# Patient Record
Sex: MALE | Race: WHITE | Employment: OTHER | URBAN - METROPOLITAN AREA
[De-identification: names, ages, dates, MRNs, and addresses within clinical notes are randomized per-mention and may not be internally consistent; named-entity substitution may affect disease eponyms.]

---

## 2018-02-07 ENCOUNTER — HOSPITAL ENCOUNTER (EMERGENCY)
Age: 63
Discharge: ANOTHER HOSPITAL | End: 2018-02-07
Attending: EMERGENCY MEDICINE

## 2018-02-07 ENCOUNTER — HOSPITAL ENCOUNTER (EMERGENCY)
Age: 63
Discharge: HOME | End: 2018-02-08
Attending: EMERGENCY MEDICINE

## 2018-02-07 VITALS
DIASTOLIC BLOOD PRESSURE: 107 MMHG | BODY MASS INDEX: 29.8 KG/M2 | OXYGEN SATURATION: 99 % | RESPIRATION RATE: 17 BRPM | HEART RATE: 71 BPM | WEIGHT: 220 LBS | TEMPERATURE: 97.9 F | SYSTOLIC BLOOD PRESSURE: 156 MMHG | HEIGHT: 72 IN

## 2018-02-07 VITALS
OXYGEN SATURATION: 93 % | DIASTOLIC BLOOD PRESSURE: 120 MMHG | HEART RATE: 71 BPM | RESPIRATION RATE: 18 BRPM | HEIGHT: 72 IN | WEIGHT: 220 LBS | SYSTOLIC BLOOD PRESSURE: 190 MMHG | TEMPERATURE: 97.2 F | BODY MASS INDEX: 29.8 KG/M2
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
## 2018-02-07 DIAGNOSIS — L03.011 CELLULITIS OF FINGER OF RIGHT HAND: Primary | ICD-10-CM

## 2018-02-07 DIAGNOSIS — L08.9 FINGER INFECTION: Primary | ICD-10-CM

## 2018-02-07 DIAGNOSIS — R03.0 ELEVATED BLOOD PRESSURE READING: ICD-10-CM

## 2018-02-07 LAB
ABSOLUTE EOS #: 0.3 K/UL (ref 0–0.4)
ABSOLUTE IMMATURE GRANULOCYTE: ABNORMAL K/UL (ref 0–0.3)
ABSOLUTE LYMPH #: 1.4 K/UL (ref 1–4.8)
ABSOLUTE MONO #: 0.8 K/UL (ref 0.2–0.8)
ANION GAP SERPL CALCULATED.3IONS-SCNC: 13 MMOL/L (ref 9–17)
BASOPHILS # BLD: 0 % (ref 0–2)
BASOPHILS ABSOLUTE: 0 K/UL (ref 0–0.2)
BUN BLDV-MCNC: 23 MG/DL (ref 8–23)
BUN/CREAT BLD: 21 (ref 9–20)
CALCIUM SERPL-MCNC: 8.8 MG/DL (ref 8.6–10.4)
CHLORIDE BLD-SCNC: 102 MMOL/L (ref 98–107)
CO2: 25 MMOL/L (ref 20–31)
CREAT SERPL-MCNC: 1.12 MG/DL (ref 0.7–1.2)
DIFFERENTIAL TYPE: ABNORMAL
EOSINOPHILS RELATIVE PERCENT: 4 % (ref 1–4)
GFR AFRICAN AMERICAN: >60 ML/MIN
GFR NON-AFRICAN AMERICAN: >60 ML/MIN
GFR SERPL CREATININE-BSD FRML MDRD: ABNORMAL ML/MIN/{1.73_M2}
GFR SERPL CREATININE-BSD FRML MDRD: ABNORMAL ML/MIN/{1.73_M2}
GLUCOSE BLD-MCNC: 134 MG/DL (ref 70–99)
HCT VFR BLD CALC: 49.8 % (ref 41–53)
HEMOGLOBIN: 16.2 G/DL (ref 13.5–17.5)
IMMATURE GRANULOCYTES: ABNORMAL %
LYMPHOCYTES # BLD: 18 % (ref 24–44)
MCH RBC QN AUTO: 26.3 PG (ref 26–34)
MCHC RBC AUTO-ENTMCNC: 32.6 G/DL (ref 31–37)
MCV RBC AUTO: 80.8 FL (ref 80–100)
MONOCYTES # BLD: 10 % (ref 1–7)
NRBC AUTOMATED: ABNORMAL PER 100 WBC
PDW BLD-RTO: 13.5 % (ref 11.5–14.5)
PLATELET # BLD: 249 K/UL (ref 130–400)
PLATELET ESTIMATE: ABNORMAL
PMV BLD AUTO: ABNORMAL FL (ref 6–12)
POTASSIUM SERPL-SCNC: 3.9 MMOL/L (ref 3.7–5.3)
RBC # BLD: 6.16 M/UL (ref 4.5–5.9)
RBC # BLD: ABNORMAL 10*6/UL
SEG NEUTROPHILS: 68 % (ref 36–66)
SEGMENTED NEUTROPHILS ABSOLUTE COUNT: 5.2 K/UL (ref 1.8–7.7)
SODIUM BLD-SCNC: 140 MMOL/L (ref 135–144)
WBC # BLD: 7.7 K/UL (ref 3.5–11)
WBC # BLD: ABNORMAL 10*3/UL

## 2018-02-07 PROCEDURE — 85025 COMPLETE CBC W/AUTO DIFF WBC: CPT

## 2018-02-07 PROCEDURE — 10160 PNXR ASPIR ABSC HMTMA BULLA: CPT

## 2018-02-07 PROCEDURE — 99283 EMERGENCY DEPT VISIT LOW MDM: CPT

## 2018-02-07 PROCEDURE — 2500000003 HC RX 250 WO HCPCS: Performed by: PHYSICIAN ASSISTANT

## 2018-02-07 PROCEDURE — 80048 BASIC METABOLIC PNL TOTAL CA: CPT

## 2018-02-07 PROCEDURE — 6370000000 HC RX 637 (ALT 250 FOR IP): Performed by: PHYSICIAN ASSISTANT

## 2018-02-07 PROCEDURE — 99284 EMERGENCY DEPT VISIT MOD MDM: CPT

## 2018-02-07 RX ORDER — LIDOCAINE HYDROCHLORIDE 10 MG/ML
20 INJECTION, SOLUTION INFILTRATION; PERINEURAL ONCE
Status: COMPLETED | OUTPATIENT
Start: 2018-02-07 | End: 2018-02-07

## 2018-02-07 RX ORDER — CLONIDINE HYDROCHLORIDE 0.1 MG/1
0.1 TABLET ORAL ONCE
Status: COMPLETED | OUTPATIENT
Start: 2018-02-07 | End: 2018-02-07

## 2018-02-07 RX ORDER — ASPIRIN 325 MG
325 TABLET ORAL DAILY
Status: ON HOLD | COMMUNITY
End: 2018-08-22 | Stop reason: HOSPADM

## 2018-02-07 RX ORDER — HYDROCHLOROTHIAZIDE 25 MG/1
25 TABLET ORAL DAILY
Status: ON HOLD | COMMUNITY
End: 2018-08-22 | Stop reason: HOSPADM

## 2018-02-07 RX ADMIN — CLONIDINE HYDROCHLORIDE 0.1 MG: 0.1 TABLET ORAL at 21:36

## 2018-02-07 RX ADMIN — LIDOCAINE HYDROCHLORIDE 15 ML: 10 INJECTION, SOLUTION INFILTRATION; PERINEURAL at 19:25

## 2018-02-07 ASSESSMENT — PAIN DESCRIPTION - ORIENTATION: ORIENTATION: RIGHT

## 2018-02-07 ASSESSMENT — PAIN SCALES - GENERAL
PAINLEVEL_OUTOF10: 2
PAINLEVEL_OUTOF10: 0

## 2018-02-07 ASSESSMENT — PAIN DESCRIPTION - PAIN TYPE: TYPE: ACUTE PAIN

## 2018-02-07 ASSESSMENT — PAIN DESCRIPTION - LOCATION: LOCATION: FINGER (COMMENT WHICH ONE)

## 2018-02-08 ENCOUNTER — HOSPITAL ENCOUNTER (EMERGENCY)
Age: 63
Discharge: HOME | End: 2018-02-08
Attending: EMERGENCY MEDICINE

## 2018-02-08 VITALS
TEMPERATURE: 98.2 F | BODY MASS INDEX: 29.8 KG/M2 | DIASTOLIC BLOOD PRESSURE: 124 MMHG | RESPIRATION RATE: 16 BRPM | SYSTOLIC BLOOD PRESSURE: 211 MMHG | HEIGHT: 72 IN | WEIGHT: 220 LBS | OXYGEN SATURATION: 97 % | HEART RATE: 71 BPM

## 2018-02-08 DIAGNOSIS — L03.011 CELLULITIS OF RIGHT RING FINGER: Primary | ICD-10-CM

## 2018-02-08 PROCEDURE — 99282 EMERGENCY DEPT VISIT SF MDM: CPT

## 2018-02-08 ASSESSMENT — ENCOUNTER SYMPTOMS
PHOTOPHOBIA: 0
SORE THROAT: 0
BACK PAIN: 0
DIARRHEA: 0
SHORTNESS OF BREATH: 0
COUGH: 0
EYE PAIN: 0
SHORTNESS OF BREATH: 0
VOMITING: 0
CONSTIPATION: 0
STRIDOR: 0
WHEEZING: 0
NAUSEA: 0
ABDOMINAL DISTENTION: 0
COUGH: 0
WHEEZING: 0

## 2018-02-08 NOTE — ED PROVIDER NOTES
[facility]     Emergency Department     Faculty Attestation        I performed a history and physical examination of the patient and discussed management with the resident. I reviewed the residents note and agree with the documented findings and plan of care. Any areas of disagreement are noted on the chart. I was personally present for the key portions of any procedures. I have documented in the chart those procedures where I was not present during the key portions. I have reviewed the emergency nurses triage note. I agree with the chief complaint, past medical history, past surgical history, allergies, medications, social and family history as documented unless otherwise noted below. For mid-level providers such as nurse practitioners as well as physicians assistants:    I have personally seen and evaluated the patient. I find the patient's history and physical exam are consistent with NP/PA documentation. I agree with the care provided, treatment rendered, disposition, & follow-up plan. Additional findings are as noted. Vital Signs: BP (!) 156/107   Pulse 71   Temp 97.9 °F (36.6 °C) (Oral)   Resp 17   Ht 6' (1.829 m)   Wt 220 lb (99.8 kg)   SpO2 99%   BMI 29.84 kg/m²   PCP:  Kirsten Ramsay MD    Pertinent Comments:     Patient's ring was removed here. Patient has evidence of cellulitis over where the ring was. He is otherwise afebrile nontoxic. There are no clinical signs to suggest a flexor tenosynovitis, at this time. Patient is already Keflex. He has had one dose already. We will have patient to continue to take Keflex. We have asked him to return tomorrow for a recheck. He understands if he gets worse before this to return immediately. Patient is agreeable to plan. He has no questions or concerns prior to discharge. Patient declined prescription for narcotic pain medication.       Critical Care  None         Note, if
and presents today for ring removal from that hand. He states he was seen at [address] [place] [address] ED earlier this evening for similar complaints, and they tried to drain the finger in order to remove the ring; however they were not successful. A motorized ring cutter was used to cut the ring from the dorsal aspect of the hand; the band was cut in one section, and a pair of pliers was used to spread apart the rest of the ring and then removed from the finger. Patient tolerated the procedure with minimal discomfort, and was able to flex and extend finger after removal of the ring. Recommended continuation of course of Keflex 500 mg QID x 10 days. Recommended patient return to the ED on 2/8/18 around [id number] for evaluation of cellulitis, or earlier if symptoms persist.     PROCEDURES:  Ring removal from fourth digit of right hand. See procedure note by Dr. Henrine Meckel. DO Caity.     CONSULTS:  None    CRITICAL CARE:  None    FINAL IMPRESSION      1. Cellulitis of finger of right hand    2. Ring or other jewelry causing external constriction, initial encounter          DISPOSITION / PLAN     DISPOSITION Decision To Discharge 02/08/2018 12:53:37 AM      PATIENT REFERRED TO:  [facility]  [address]  Go today  Follow up for cellulits on 2/8/18 around 1530, or sooner if sympmtoms worsen.       DISCHARGE MEDICATIONS:  New Prescriptions    No medications on file       Hong Govea DO  Emergency Medicine Resident    (Please note that portions of this note were completed with a voice recognition program.  Efforts were made to edit the dictations but occasionally words are mis-transcribed.)       Hong Govea DO  Resident  02/08/18 6611

## 2018-02-08 NOTE — ED PROVIDER NOTES
History Narrative    No narrative on file       History reviewed. No pertinent family history. Allergies:  Lisinopril    Home Medications:  Prior to Admission medications    Medication Sig Start Date End Date Taking? Authorizing Provider   HYDROCHLOROTHIAZIDE PO Take by mouth daily   Yes Historical Provider, MD   aspirin 325 MG tablet Take 325 mg by mouth daily   Yes Historical Provider, MD       REVIEW OF SYSTEMS    (2-9 systems for level 4, 10 or more for level 5)      Review of Systems   Constitutional: Negative for activity change, appetite change, chills, diaphoresis, fatigue and fever. Respiratory: Negative for cough, shortness of breath, wheezing and stridor. Cardiovascular: Negative for chest pain and leg swelling. Musculoskeletal: Positive for joint swelling. Negative for arthralgias and myalgias. Skin: Positive for wound. Negative for rash. PHYSICAL EXAM   (up to 7 for level 4, 8 or more for level 5)      INITIAL VITALS:   BP (!) 211/124   Pulse 71   Temp 98.2 °F (36.8 °C) (Oral)   Resp 16   Ht 6' (1.829 m)   Wt 220 lb (99.8 kg)   SpO2 97%   BMI 29.84 kg/m²     Physical Exam   Constitutional: He is oriented to person, place, and time. He appears well-developed and well-nourished. No distress. Cardiovascular: Normal rate, regular rhythm and normal heart sounds. No murmur heard. Pulmonary/Chest: Effort normal and breath sounds normal. No respiratory distress. He has no wheezes. Musculoskeletal:        Hands: There is significant soft tissue swelling of the right fourth digit between the MTP and PIP joint there is some mild erythema over this swelling patient is status post incision and drainage. There is slightly decreased swelling relative to the photo of the patient's finger that is in the chart. There is no pain with passive extension of the finger patient has intact range of motion of the finger no tenderness to palpation along the length of the finger. Neurological: He is alert and oriented to person, place, and time. Skin: He is not diaphoretic. DIFFERENTIAL  DIAGNOSIS     PLAN (LABS / IMAGING / EKG):  No orders of the defined types were placed in this encounter. MEDICATIONS ORDERED:  No orders of the defined types were placed in this encounter. DIAGNOSTIC RESULTS / EMERGENCY DEPARTMENT COURSE / MDM     LABS:  No results found for this visit on 02/08/18. RADIOLOGY:  None    EKG  None    All EKG's are interpreted by the Emergency Department Physician who either signs or Co-signs this chart in the absence of a cardiologist.    EMERGENCY DEPARTMENT COURSE:    Patient assessed at bedside he is resting comfortably has good range of motion and has no physical complaints at this time he has been encouraged to continue taking his antibiotics and follow-up with his PCP for wound check as needed. PROCEDURES:  None    CONSULTS:  None    CRITICAL CARE:  None    FINAL IMPRESSION      1.  Cellulitis of right ring finger          DISPOSITION / PLAN     DISPOSITION Decision To Discharge 02/08/2018 04:38:06 PM      PATIENT REFERRED TO:  Berta Martin, [address] [place] [address]  [phone]    Schedule an appointment as soon as possible for a visit   As needed      DISCHARGE MEDICATIONS:  New Prescriptions    No medications on file       Patricia Garza MD  Emergency Medicine Resident    (Please note that portions of this note were completed with a voice recognition program.  Efforts were made to edit the dictations but occasionally words are mis-transcribed.)        Patricia Garza MD  Resident  02/08/18 9574

## 2018-02-08 NOTE — ED PROVIDER NOTES
[address] [facility]     Emergency Department     Faculty Attestation    I performed a history and physical examination of the patient and discussed management with the resident. I reviewed the residents note and agree with the documented findings including all diagnostic interpretations and plan of care. Any areas of disagreement are noted on the chart. I was personally present for the key portions of any procedures. I have documented in the chart those procedures where I was not present during the key portions. I have reviewed the emergency nurses triage note. I agree with the chief complaint, past medical history, past surgical history, allergies, medications, social and family history as documented unless otherwise noted below. Documentation of the HPI, Physical Exam and Medical Decision Making performed by scribmarcelino is based on my personal performance of the HPI, PE and MDM. For Physician Assistant/ Nurse Practitioner cases/documentation I have personally evaluated this patient and have completed at least one if not all key elements of the E/M (history, physical exam, and MDM). Additional findings are as noted. Primary Care Physician: Robert Fried MD    History: This is a 58 y.o. male who presents to the Emergency Department with complaint of wound check. Patient seen yesterday for swelling of the finger and ring removal.  He did have an incision and drainage performed which only put out blood. He is currently on Keflex and has been taking them as prescribed. He reports mild improvement in the swelling and the pain has resolved. No discharge no new redness. Physical:     height is 6' (1.829 m) and weight is 220 lb (99.8 kg). His oral temperature is 98.2 °F (36.8 °C). His blood pressure is 211/124 (abnormal) and his pulse is 71.  His respiration is 16 and oxygen saturation is 97%.    58 y.o. male no acute distress, right ring finger shows indentation consistent with recent ring removal, comparing the picture obtained in clinical media there is mild reduction in the swelling distal to where the ring was removed but otherwise no acute changes, no worsening of swelling or erythema. There is no pain with passive stretch of the finger    Impression: Wound check    Plan: Continue Keflex, follow-up with PCP, strict return precautions return if pain worsens, worsening swelling or any red streaks down the hand. Pre-hypertension/Hypertension: The patient has been informed that they may have pre-hypertension or Hypertension based on a blood pressure reading in the emergency department. I recommend that the patient call the primary care provider listed on their discharge instructions or a physician of their choice this week to arrange follow up for further evaluation of possible pre-hypertension or Hypertension.       Ivis Del Real MD  Attending Emergency Physician        Bobbi Mayfield MD  02/08/18 4915

## 2018-08-18 ENCOUNTER — HOSPITAL ENCOUNTER (INPATIENT)
Age: 63
LOS: 4 days | Discharge: HOME | DRG: 683 | End: 2018-08-22
Attending: FAMILY MEDICINE | Admitting: INTERNAL MEDICINE

## 2018-08-18 ENCOUNTER — APPOINTMENT (OUTPATIENT)
Dept: CT IMAGING | Age: 63
DRG: 683 | End: 2018-08-18

## 2018-08-18 ENCOUNTER — APPOINTMENT (OUTPATIENT)
Dept: INTERVENTIONAL RADIOLOGY/VASCULAR | Age: 63
DRG: 683 | End: 2018-08-18

## 2018-08-18 DIAGNOSIS — N19 RENAL FAILURE, UNSPECIFIED CHRONICITY: Primary | ICD-10-CM

## 2018-08-18 PROBLEM — I10 HYPERTENSION: Status: ACTIVE | Noted: 2018-08-18

## 2018-08-18 PROBLEM — E87.5 HYPERKALEMIA: Status: ACTIVE | Noted: 2018-08-18

## 2018-08-18 PROBLEM — R97.20 ELEVATED PSA: Status: ACTIVE | Noted: 2018-08-18

## 2018-08-18 PROBLEM — R33.9 URINARY RETENTION: Status: ACTIVE | Noted: 2018-08-18

## 2018-08-18 PROBLEM — E87.29 HIGH ANION GAP METABOLIC ACIDOSIS: Status: ACTIVE | Noted: 2018-08-18

## 2018-08-18 PROBLEM — N18.6 ESRD (END STAGE RENAL DISEASE) (HCC): Status: ACTIVE | Noted: 2018-08-18

## 2018-08-18 LAB
ABSOLUTE EOS #: 0.1 K/UL (ref 0–0.4)
ABSOLUTE IMMATURE GRANULOCYTE: ABNORMAL K/UL (ref 0–0.3)
ABSOLUTE LYMPH #: 0.8 K/UL (ref 1–4.8)
ABSOLUTE MONO #: 0.7 K/UL (ref 0.2–0.8)
ALBUMIN SERPL-MCNC: 4.1 G/DL (ref 3.5–5.2)
ALBUMIN/GLOBULIN RATIO: ABNORMAL (ref 1–2.5)
ALP BLD-CCNC: 229 U/L (ref 40–129)
ALT SERPL-CCNC: 115 U/L (ref 5–41)
AMYLASE: 99 U/L (ref 28–100)
ANION GAP SERPL CALCULATED.3IONS-SCNC: 27 MMOL/L (ref 9–17)
ANION GAP SERPL CALCULATED.3IONS-SCNC: 32 MMOL/L (ref 9–17)
AST SERPL-CCNC: 39 U/L
BASOPHILS # BLD: 0 % (ref 0–2)
BASOPHILS ABSOLUTE: 0 K/UL (ref 0–0.2)
BILIRUB SERPL-MCNC: 0.4 MG/DL (ref 0.3–1.2)
BNP INTERPRETATION: ABNORMAL
BUN BLDV-MCNC: 108 MG/DL (ref 8–23)
BUN BLDV-MCNC: 123 MG/DL (ref 8–23)
BUN/CREAT BLD: 4 (ref 9–20)
BUN/CREAT BLD: 6 (ref 9–20)
CALCIUM SERPL-MCNC: 8.3 MG/DL (ref 8.6–10.4)
CALCIUM SERPL-MCNC: 9.2 MG/DL (ref 8.6–10.4)
CHLORIDE BLD-SCNC: 100 MMOL/L (ref 98–107)
CHLORIDE BLD-SCNC: 94 MMOL/L (ref 98–107)
CO2: 12 MMOL/L (ref 20–31)
CO2: 15 MMOL/L (ref 20–31)
CREAT SERPL-MCNC: 17.52 MG/DL (ref 0.7–1.2)
CREAT SERPL-MCNC: 27.9 MG/DL (ref 0.7–1.2)
DIFFERENTIAL TYPE: ABNORMAL
EOSINOPHILS RELATIVE PERCENT: 1 % (ref 1–4)
GFR AFRICAN AMERICAN: 2 ML/MIN
GFR AFRICAN AMERICAN: 3 ML/MIN
GFR NON-AFRICAN AMERICAN: 2 ML/MIN
GFR NON-AFRICAN AMERICAN: 3 ML/MIN
GFR SERPL CREATININE-BSD FRML MDRD: ABNORMAL ML/MIN/{1.73_M2}
GLUCOSE BLD-MCNC: 119 MG/DL (ref 70–99)
GLUCOSE BLD-MCNC: 61 MG/DL (ref 70–99)
GLUCOSE BLD-MCNC: 83 MG/DL (ref 75–110)
HCT VFR BLD CALC: 40.1 % (ref 41–53)
HCT VFR BLD CALC: 45.7 % (ref 41–53)
HEMOGLOBIN: 12.9 G/DL (ref 13.5–17.5)
HEMOGLOBIN: 14.9 G/DL (ref 13.5–17.5)
IMMATURE GRANULOCYTES: ABNORMAL %
INR BLD: 1.2
LACTIC ACID: 0.7 MMOL/L (ref 0.5–2.2)
LIPASE: 80 U/L (ref 13–60)
LYMPHOCYTES # BLD: 8 % (ref 24–44)
MCH RBC QN AUTO: 26.2 PG (ref 26–34)
MCHC RBC AUTO-ENTMCNC: 32.3 G/DL (ref 31–37)
MCV RBC AUTO: 81.2 FL (ref 80–100)
MONOCYTES # BLD: 8 % (ref 1–7)
NRBC AUTOMATED: ABNORMAL PER 100 WBC
PDW BLD-RTO: 15.4 % (ref 11.5–14.5)
PHOSPHORUS: 6.3 MG/DL (ref 2.5–4.5)
PLATELET # BLD: 297 K/UL (ref 130–400)
PLATELET ESTIMATE: ABNORMAL
PMV BLD AUTO: 7.8 FL (ref 6–12)
POTASSIUM SERPL-SCNC: 4.5 MMOL/L (ref 3.7–5.3)
POTASSIUM SERPL-SCNC: 6 MMOL/L (ref 3.7–5.3)
PRO-BNP: 1024 PG/ML
PROSTATE SPECIFIC ANTIGEN: 16.67 UG/L
PROTHROMBIN TIME: 12.3 SEC (ref 9.7–11.6)
RBC # BLD: 4.94 M/UL (ref 4.5–5.9)
RBC # BLD: ABNORMAL 10*6/UL
SEG NEUTROPHILS: 83 % (ref 36–66)
SEGMENTED NEUTROPHILS ABSOLUTE COUNT: 8.2 K/UL (ref 1.8–7.7)
SODIUM BLD-SCNC: 138 MMOL/L (ref 135–144)
SODIUM BLD-SCNC: 142 MMOL/L (ref 135–144)
TOTAL PROTEIN: 7.2 G/DL (ref 6.4–8.3)
WBC # BLD: 9.8 K/UL (ref 3.5–11)
WBC # BLD: ABNORMAL 10*3/UL

## 2018-08-18 PROCEDURE — 99285 EMERGENCY DEPT VISIT HI MDM: CPT

## 2018-08-18 PROCEDURE — 80048 BASIC METABOLIC PNL TOTAL CA: CPT

## 2018-08-18 PROCEDURE — 85018 HEMOGLOBIN: CPT

## 2018-08-18 PROCEDURE — 87340 HEPATITIS B SURFACE AG IA: CPT

## 2018-08-18 PROCEDURE — 6360000002 HC RX W HCPCS: Performed by: FAMILY MEDICINE

## 2018-08-18 PROCEDURE — 6370000000 HC RX 637 (ALT 250 FOR IP): Performed by: FAMILY MEDICINE

## 2018-08-18 PROCEDURE — 74176 CT ABD & PELVIS W/O CONTRAST: CPT

## 2018-08-18 PROCEDURE — 82330 ASSAY OF CALCIUM: CPT

## 2018-08-18 PROCEDURE — 84100 ASSAY OF PHOSPHORUS: CPT

## 2018-08-18 PROCEDURE — 96376 TX/PRO/DX INJ SAME DRUG ADON: CPT

## 2018-08-18 PROCEDURE — 86704 HEP B CORE ANTIBODY TOTAL: CPT

## 2018-08-18 PROCEDURE — 2500000003 HC RX 250 WO HCPCS: Performed by: FAMILY MEDICINE

## 2018-08-18 PROCEDURE — C1894 INTRO/SHEATH, NON-LASER: HCPCS

## 2018-08-18 PROCEDURE — 93005 ELECTROCARDIOGRAM TRACING: CPT

## 2018-08-18 PROCEDURE — 2580000003 HC RX 258: Performed by: FAMILY MEDICINE

## 2018-08-18 PROCEDURE — 80053 COMPREHEN METABOLIC PANEL: CPT

## 2018-08-18 PROCEDURE — 51702 INSERT TEMP BLADDER CATH: CPT

## 2018-08-18 PROCEDURE — 85610 PROTHROMBIN TIME: CPT

## 2018-08-18 PROCEDURE — 83690 ASSAY OF LIPASE: CPT

## 2018-08-18 PROCEDURE — 96374 THER/PROPH/DIAG INJ IV PUSH: CPT

## 2018-08-18 PROCEDURE — 85014 HEMATOCRIT: CPT

## 2018-08-18 PROCEDURE — 90937 HEMODIALYSIS REPEATED EVAL: CPT

## 2018-08-18 PROCEDURE — 82150 ASSAY OF AMYLASE: CPT

## 2018-08-18 PROCEDURE — 86317 IMMUNOASSAY INFECTIOUS AGENT: CPT

## 2018-08-18 PROCEDURE — 76937 US GUIDE VASCULAR ACCESS: CPT | Performed by: RADIOLOGY

## 2018-08-18 PROCEDURE — 85025 COMPLETE CBC W/AUTO DIFF WBC: CPT

## 2018-08-18 PROCEDURE — 83605 ASSAY OF LACTIC ACID: CPT

## 2018-08-18 PROCEDURE — 02HV33Z INSERTION OF INFUSION DEVICE INTO SUPERIOR VENA CAVA, PERCUTANEOUS APPROACH: ICD-10-PCS | Performed by: RADIOLOGY

## 2018-08-18 PROCEDURE — 82947 ASSAY GLUCOSE BLOOD QUANT: CPT

## 2018-08-18 PROCEDURE — 83880 ASSAY OF NATRIURETIC PEPTIDE: CPT

## 2018-08-18 PROCEDURE — 96375 TX/PRO/DX INJ NEW DRUG ADDON: CPT

## 2018-08-18 PROCEDURE — 5A1D70Z PERFORMANCE OF URINARY FILTRATION, INTERMITTENT, LESS THAN 6 HOURS PER DAY: ICD-10-PCS | Performed by: INTERNAL MEDICINE

## 2018-08-18 PROCEDURE — 84153 ASSAY OF PSA TOTAL: CPT

## 2018-08-18 PROCEDURE — 2060000000 HC ICU INTERMEDIATE R&B

## 2018-08-18 PROCEDURE — 2000000000 HC ICU R&B

## 2018-08-18 PROCEDURE — 36556 INSERT NON-TUNNEL CV CATH: CPT | Performed by: RADIOLOGY

## 2018-08-18 PROCEDURE — 77001 FLUOROGUIDE FOR VEIN DEVICE: CPT | Performed by: RADIOLOGY

## 2018-08-18 PROCEDURE — 87086 URINE CULTURE/COLONY COUNT: CPT

## 2018-08-18 RX ORDER — SODIUM CHLORIDE 450 MG/100ML
INJECTION, SOLUTION INTRAVENOUS CONTINUOUS
Status: DISCONTINUED | OUTPATIENT
Start: 2018-08-18 | End: 2018-08-19

## 2018-08-18 RX ORDER — DEXTROSE MONOHYDRATE 25 G/50ML
25 INJECTION, SOLUTION INTRAVENOUS ONCE
Status: COMPLETED | OUTPATIENT
Start: 2018-08-18 | End: 2018-08-18

## 2018-08-18 RX ORDER — HYDRALAZINE HYDROCHLORIDE 20 MG/ML
10 INJECTION INTRAMUSCULAR; INTRAVENOUS EVERY 6 HOURS PRN
Status: DISCONTINUED | OUTPATIENT
Start: 2018-08-18 | End: 2018-08-20

## 2018-08-18 RX ADMIN — Medication: at 20:35

## 2018-08-18 RX ADMIN — HYDRALAZINE HYDROCHLORIDE 10 MG: 20 INJECTION INTRAMUSCULAR; INTRAVENOUS at 19:12

## 2018-08-18 RX ADMIN — HYDRALAZINE HYDROCHLORIDE 10 MG: 20 INJECTION INTRAMUSCULAR; INTRAVENOUS at 18:00

## 2018-08-18 RX ADMIN — DEXTROSE MONOHYDRATE 25 G: 25 INJECTION, SOLUTION INTRAVENOUS at 19:11

## 2018-08-18 RX ADMIN — INSULIN HUMAN 10 UNITS: 100 INJECTION, SOLUTION PARENTERAL at 19:11

## 2018-08-18 RX ADMIN — SODIUM CHLORIDE: 4.5 INJECTION, SOLUTION INTRAVENOUS at 20:46

## 2018-08-18 RX ADMIN — LIDOCAINE HYDROCHLORIDE 20 ML: 20 JELLY TOPICAL at 17:59

## 2018-08-18 RX ADMIN — HYDRALAZINE HYDROCHLORIDE 10 MG: 20 INJECTION INTRAMUSCULAR; INTRAVENOUS at 23:30

## 2018-08-18 ASSESSMENT — ENCOUNTER SYMPTOMS
EYES NEGATIVE: 1
GASTROINTESTINAL NEGATIVE: 1
ALLERGIC/IMMUNOLOGIC NEGATIVE: 1
RESPIRATORY NEGATIVE: 1

## 2018-08-18 ASSESSMENT — PAIN SCALES - GENERAL
PAINLEVEL_OUTOF10: 0
PAINLEVEL_OUTOF10: 0

## 2018-08-18 NOTE — ED PROVIDER NOTES
[facility]  eMERGENCY dEPARTMENT eNCOUnter            Chief Complaint       Chief Complaint   Patient presents with    Urinary Retention     last voided small amount this am    Nausea     past week    Hypertension     in triage / pt states he has not taken his prescribed medications past \"few days\"       Nurses Notes reviewed and I agree except as noted in the HPI. HISTORY OF PRESENT ILLNESS   Is a 26-year-old  male who apparently since the early part of this wound has been having increasing frequency. He also has been having here as of late over the last 2-3 days inability to urinate. He's had some diarrhea on and off and just hasn't been feeling well. No chest pain or shortness of breath with fatigue. She had kidney stones in the past.  She did have elevated PSAs in the past and he follow with the urologist but apparently urologist said some people have that chronically. Nothing much else was done to our knowledge. REVIEW OF SYSTEMS       Review of Systems   Constitutional: Positive for fatigue. HENT: Negative. Eyes: Negative. Respiratory: Negative. Cardiovascular: Negative. Gastrointestinal: Negative. Endocrine: Negative. Genitourinary: Positive for difficulty urinating and dysuria. Musculoskeletal: Negative. Skin: Negative. Allergic/Immunologic: Negative. Neurological: Negative. Hematological: Negative. Psychiatric/Behavioral: Negative. Negative for self-injury and suicidal ideas.        PAST MEDICAL HISTORY         Diagnosis Date    Elevated PSA     Hypertension     Kidney stone     Seasonal allergies        SURGICAL HISTORY           Procedure Laterality Date    CYSTOSCOPY         CURRENT MEDICATIONS       Previous Medications    ASPIRIN 325 MG TABLET    Take 325 mg by mouth daily    DILTIAZEM HCL COATED BEADS (CARDIZEM CD PO)    Take by mouth Unsure of dose    HYDROCHLOROTHIAZIDE PO    Take 25 mg by mouth daily Psychiatric: He has a normal mood and affect. His behavior is normal. Judgment and thought content normal. He expresses no homicidal and no suicidal ideation. DIFFERENTIAL DIAGNOSIS:   There is no problem list on file for this patient. DIAGNOSTIC RESULTS     EKG: All EKG's are interpreted by the Emergency Department Physician who either signs or Co-signs this chart in the absence of a cardiologist.    See Procedure Section for Details. RADIOLOGY: non-plain film images(s) such as CT, Ultrasound and MRI are read by the radiologist.  The patient had a  view X-ray of the  which demonstrates   [] Visualized and interpreted by me   [] Radiologist's Wet Read Report Reviewed   [] Discussed with Radiologist.    No results found. No results found.       LABS:   Labs Reviewed   CBC WITH AUTO DIFFERENTIAL - Abnormal; Notable for the following:        Result Value    Hemoglobin 12.9 (*)     Hematocrit 40.1 (*)     RDW 15.4 (*)     Seg Neutrophils 83 (*)     Lymphocytes 8 (*)     Monocytes 8 (*)     Segs Absolute 8.20 (*)     Absolute Lymph # 0.80 (*)     All other components within normal limits   COMPREHENSIVE METABOLIC PANEL W/ REFLEX TO MG FOR LOW K - Abnormal; Notable for the following:     Glucose 119 (*)      (*)     CREATININE 27.90 (*)     Bun/Cre Ratio 4 (*)     Calcium 8.3 (*)     Potassium 6.0 (*)     Chloride 94 (*)     CO2 12 (*)     Anion Gap 32 (*)     Alkaline Phosphatase 229 (*)      (*)     GFR Non- 2 (*)     GFR  2 (*)     All other components within normal limits   BRAIN NATRIURETIC PEPTIDE - Abnormal; Notable for the following:     Pro-BNP 1,024 (*)     All other components within normal limits   LIPASE - Abnormal; Notable for the following:     Lipase 80 (*)     All other components within normal limits   PROTIME-INR - Abnormal; Notable for the following:     Protime 12.3 (*)     All other components within normal limits   PSA, Date/Time    AMYLASE 99 08/18/2018 04:50 PM    LIPASE 80 (H) 08/18/2018 04:50 PM     (H) 08/18/2018 04:50 PM    AST 39 08/18/2018 04:50 PM     Lab Results   Component Value Date/Time    PROTIME 12.3 (H) 08/18/2018 04:50 PM    INR 1.2 08/18/2018 04:50 PM     No results found for: Gabrielle Jacob, KETUA, HGBUR, NITRU, LEUKOCYTESUR, GLUCOSEU  Lab Results   Component Value Date/Time    PROBNP 1,024 (H) 08/18/2018 04:50 PM     No results found for: CRP      EMERGENCY DEPARTMENT COURSE:   Vitals:    Vitals:    08/18/18 1612 08/18/18 1737 08/18/18 1747 08/18/18 1749   BP: (!) 200/118 (!) 198/102 (!) 190/106 (!) 194/103   Pulse: 78 70 70 69   Resp: 18 19 20 19   Temp: 98 °F (36.7 °C)      TempSrc: Oral      SpO2: 94% 91% 93% 90%   Weight: 220 lb (99.8 kg)      Height: 6' (1.829 m)        6:55 PM I did brief the patient on his results and our plan for admission. He does have a critical creatinine level and his potassium is high. He will probably need admission. On reevaluation he is filling the Whitmore bag full he rapidly. Brendan 30 p.m. I did talk to the hospitalist and he is agreeable to admission to a stepdown bed. I have a call out to nephrology as well as urology. Patient continues to be hemodynamically normal except for high blood pressure we've given him hydralazine again for his blood pressure. We'll go ahead and await further consultation  8:10 PM I did speak with nephrology they want a sodium bicarb drip running on the patient they also want the patient in the ICU and they wanted interventional radiology to come in to put a Robert. Her awaiting callback from interventional radiology. She has also been briefed. 9:07 PM I did speak with the interventional radiologist on-call and they will be in to place the [address]. I did notify them that if they had any questions they can get a hold of nephrology for any further details on dialysis plans.   Sepsis Reexam (sepsisrepeatexam)         PROCEEDURE:   EKG 12 while taking pain meds, nerve pills or muscle relaxers. PATIENT REFERRED TO:  No follow-up provider specified. DISCHARGE MEDICATIONS:  New Prescriptions    No medications on file       (Please note that portions of this note were completed with a voice recognition program.  Efforts were made to edit the dictations but occasionally words are mis-transcribed. Jenna Arzola, DO  8/18/18        Cynthia Reyes, DO  08/18/18 2954

## 2018-08-18 NOTE — ED NOTES
Patient brought into  by private auto. ambulatory to treatment area. advsied by patient that around 3 weeks ago he developed frequency of urination which lasted around 2 weeks and then he experienced difficulty voiding. Patient last over the past week he has had decreased appetite and increased weakness followed by episodes of loose stools. Patient states that he was treated for a kidney stone in 1991. Marleni Rivas Upon arrival patient is alert/oriented. Respirations non labored. Skin color slightly pale. Patient had a involuntary stool prior to being taken to treatment area. Tongue/lips dry abd appear enlarged and firm. Bladder scan done and shows over 999 ml in bladder. Doctor katja notified.       Isac Mcgee RN  08/18/18 7151

## 2018-08-18 NOTE — ED NOTES
Total urine output after ogden inserted 3000ml of reddish colored urine. Doctor roxana notified.       Gaurav Barrow RN  08/18/18 0888

## 2018-08-19 PROBLEM — I47.10 SVT (SUPRAVENTRICULAR TACHYCARDIA): Status: ACTIVE | Noted: 2018-08-19

## 2018-08-19 PROBLEM — I47.1 SVT (SUPRAVENTRICULAR TACHYCARDIA) (HCC): Status: ACTIVE | Noted: 2018-08-19

## 2018-08-19 PROBLEM — N13.9 OBSTRUCTIVE UROPATHY: Status: ACTIVE | Noted: 2018-08-19

## 2018-08-19 PROBLEM — N13.30 HYDRONEPHROSIS: Status: ACTIVE | Noted: 2018-08-19

## 2018-08-19 PROBLEM — I45.10 INCOMPLETE RBBB: Status: ACTIVE | Noted: 2018-08-19

## 2018-08-19 PROBLEM — R33.8 HYPERTROPHY OF PROSTATE WITH URINARY RETENTION: Status: ACTIVE | Noted: 2018-08-19

## 2018-08-19 PROBLEM — N40.1 HYPERTROPHY OF PROSTATE WITH URINARY RETENTION: Status: ACTIVE | Noted: 2018-08-19

## 2018-08-19 LAB
ABSOLUTE EOS #: 0 K/UL (ref 0–0.4)
ABSOLUTE IMMATURE GRANULOCYTE: ABNORMAL K/UL (ref 0–0.3)
ABSOLUTE LYMPH #: 0.6 K/UL (ref 1–4.8)
ABSOLUTE MONO #: 1.2 K/UL (ref 0.2–0.8)
ALBUMIN SERPL-MCNC: 4.3 G/DL (ref 3.5–5.2)
ALBUMIN/GLOBULIN RATIO: ABNORMAL (ref 1–2.5)
ALP BLD-CCNC: 248 U/L (ref 40–129)
ALT SERPL-CCNC: 117 U/L (ref 5–41)
ANION GAP SERPL CALCULATED.3IONS-SCNC: 13 MMOL/L (ref 9–17)
ANION GAP SERPL CALCULATED.3IONS-SCNC: 17 MMOL/L (ref 9–17)
ANION GAP SERPL CALCULATED.3IONS-SCNC: 20 MMOL/L (ref 9–17)
ANION GAP SERPL CALCULATED.3IONS-SCNC: 24 MMOL/L (ref 9–17)
ANION GAP SERPL CALCULATED.3IONS-SCNC: 27 MMOL/L (ref 9–17)
AST SERPL-CCNC: 30 U/L
BASOPHILS # BLD: 0 % (ref 0–2)
BASOPHILS ABSOLUTE: 0 K/UL (ref 0–0.2)
BILIRUB SERPL-MCNC: 0.66 MG/DL (ref 0.3–1.2)
BUN BLDV-MCNC: 41 MG/DL (ref 8–23)
BUN BLDV-MCNC: 50 MG/DL (ref 8–23)
BUN BLDV-MCNC: 56 MG/DL (ref 8–23)
BUN BLDV-MCNC: 61 MG/DL (ref 8–23)
BUN BLDV-MCNC: 87 MG/DL (ref 8–23)
BUN/CREAT BLD: 11 (ref 9–20)
BUN/CREAT BLD: 13 (ref 9–20)
BUN/CREAT BLD: 7 (ref 9–20)
BUN/CREAT BLD: 8 (ref 9–20)
BUN/CREAT BLD: 9 (ref 9–20)
CALCIUM IONIZED: 1.27 MMOL/L (ref 1.13–1.33)
CALCIUM SERPL-MCNC: 7.9 MG/DL (ref 8.6–10.4)
CALCIUM SERPL-MCNC: 8.3 MG/DL (ref 8.6–10.4)
CALCIUM SERPL-MCNC: 8.4 MG/DL (ref 8.6–10.4)
CALCIUM SERPL-MCNC: 8.7 MG/DL (ref 8.6–10.4)
CALCIUM SERPL-MCNC: 9 MG/DL (ref 8.6–10.4)
CHLORIDE BLD-SCNC: 102 MMOL/L (ref 98–107)
CHLORIDE BLD-SCNC: 103 MMOL/L (ref 98–107)
CHLORIDE BLD-SCNC: 105 MMOL/L (ref 98–107)
CO2: 14 MMOL/L (ref 20–31)
CO2: 16 MMOL/L (ref 20–31)
CO2: 18 MMOL/L (ref 20–31)
CO2: 22 MMOL/L (ref 20–31)
CO2: 26 MMOL/L (ref 20–31)
CREAT SERPL-MCNC: 11.79 MG/DL (ref 0.7–1.2)
CREAT SERPL-MCNC: 3.14 MG/DL (ref 0.7–1.2)
CREAT SERPL-MCNC: 4.39 MG/DL (ref 0.7–1.2)
CREAT SERPL-MCNC: 6.5 MG/DL (ref 0.7–1.2)
CREAT SERPL-MCNC: 7.49 MG/DL (ref 0.7–1.2)
DIFFERENTIAL TYPE: ABNORMAL
EOSINOPHILS RELATIVE PERCENT: 0 % (ref 1–4)
FIO2: 36
GFR AFRICAN AMERICAN: 11 ML/MIN
GFR AFRICAN AMERICAN: 17 ML/MIN
GFR AFRICAN AMERICAN: 24 ML/MIN
GFR AFRICAN AMERICAN: 5 ML/MIN
GFR AFRICAN AMERICAN: 9 ML/MIN
GFR NON-AFRICAN AMERICAN: 14 ML/MIN
GFR NON-AFRICAN AMERICAN: 20 ML/MIN
GFR NON-AFRICAN AMERICAN: 4 ML/MIN
GFR NON-AFRICAN AMERICAN: 7 ML/MIN
GFR NON-AFRICAN AMERICAN: 9 ML/MIN
GFR SERPL CREATININE-BSD FRML MDRD: ABNORMAL ML/MIN/{1.73_M2}
GLUCOSE BLD-MCNC: 140 MG/DL (ref 70–99)
GLUCOSE BLD-MCNC: 174 MG/DL (ref 70–99)
GLUCOSE BLD-MCNC: 179 MG/DL (ref 70–99)
GLUCOSE BLD-MCNC: 262 MG/DL (ref 70–99)
GLUCOSE BLD-MCNC: 335 MG/DL (ref 70–99)
HBV SURFACE AB TITR SER: <3.5 MIU/ML
HCT VFR BLD CALC: 37.3 % (ref 41–53)
HCT VFR BLD CALC: 40.5 % (ref 41–53)
HCT VFR BLD CALC: 43.5 % (ref 41–53)
HEMOGLOBIN: 12.3 G/DL (ref 13.5–17.5)
HEMOGLOBIN: 13.4 G/DL (ref 13.5–17.5)
HEMOGLOBIN: 14 G/DL (ref 13.5–17.5)
HEPATITIS B CORE TOTAL ANTIBODY: NONREACTIVE
HEPATITIS B SURFACE ANTIGEN: NONREACTIVE
IMMATURE GRANULOCYTES: ABNORMAL %
LYMPHOCYTES # BLD: 5 % (ref 24–44)
MAGNESIUM: 1.9 MG/DL (ref 1.6–2.6)
MAGNESIUM: 2.1 MG/DL (ref 1.6–2.6)
MAGNESIUM: 2.1 MG/DL (ref 1.6–2.6)
MAGNESIUM: 2.2 MG/DL (ref 1.6–2.6)
MCH RBC QN AUTO: 26 PG (ref 26–34)
MCHC RBC AUTO-ENTMCNC: 32.1 G/DL (ref 31–37)
MCV RBC AUTO: 81 FL (ref 80–100)
MONOCYTES # BLD: 10 % (ref 1–7)
NEGATIVE BASE EXCESS, ART: 5 (ref 0–2)
NRBC AUTOMATED: ABNORMAL PER 100 WBC
O2 DEVICE/FLOW/%: ABNORMAL
PATIENT TEMP: 37
PDW BLD-RTO: 15.2 % (ref 11.5–14.5)
PHOSPHORUS: 3.3 MG/DL (ref 2.5–4.5)
PHOSPHORUS: 4.6 MG/DL (ref 2.5–4.5)
PHOSPHORUS: 4.8 MG/DL (ref 2.5–4.5)
PHOSPHORUS: 5.5 MG/DL (ref 2.5–4.5)
PLATELET # BLD: 297 K/UL (ref 130–400)
PLATELET ESTIMATE: ABNORMAL
PMV BLD AUTO: 7.6 FL (ref 6–12)
POC HCO3: 18.8 MMOL/L (ref 22–27)
POC O2 SATURATION: 96 %
POC PCO2 TEMP: ABNORMAL MM HG
POC PCO2: 25 MM HG (ref 32–45)
POC PH TEMP: ABNORMAL
POC PH: 7.48 (ref 7.35–7.45)
POC PO2 TEMP: ABNORMAL MM HG
POC PO2: 72 MM HG (ref 75–95)
POSITIVE BASE EXCESS, ART: ABNORMAL (ref 0–2)
POTASSIUM SERPL-SCNC: 3.5 MMOL/L (ref 3.7–5.3)
POTASSIUM SERPL-SCNC: 3.9 MMOL/L (ref 3.7–5.3)
POTASSIUM SERPL-SCNC: 3.9 MMOL/L (ref 3.7–5.3)
POTASSIUM SERPL-SCNC: 4.1 MMOL/L (ref 3.7–5.3)
POTASSIUM SERPL-SCNC: 4.2 MMOL/L (ref 3.7–5.3)
RBC # BLD: 5.37 M/UL (ref 4.5–5.9)
RBC # BLD: ABNORMAL 10*6/UL
SEG NEUTROPHILS: 85 % (ref 36–66)
SEGMENTED NEUTROPHILS ABSOLUTE COUNT: 10.8 K/UL (ref 1.8–7.7)
SODIUM BLD-SCNC: 141 MMOL/L (ref 135–144)
SODIUM BLD-SCNC: 142 MMOL/L (ref 135–144)
SODIUM BLD-SCNC: 142 MMOL/L (ref 135–144)
SODIUM BLD-SCNC: 144 MMOL/L (ref 135–144)
SODIUM BLD-SCNC: 144 MMOL/L (ref 135–144)
TCO2 (CALC), ART: 20 MMOL/L (ref 23–28)
TOTAL PROTEIN: 7.7 G/DL (ref 6.4–8.3)
WBC # BLD: 12.7 K/UL (ref 3.5–11)
WBC # BLD: ABNORMAL 10*3/UL

## 2018-08-19 PROCEDURE — 2580000003 HC RX 258: Performed by: FAMILY MEDICINE

## 2018-08-19 PROCEDURE — 36415 COLL VENOUS BLD VENIPUNCTURE: CPT

## 2018-08-19 PROCEDURE — 85014 HEMATOCRIT: CPT

## 2018-08-19 PROCEDURE — 2580000003 HC RX 258: Performed by: EMERGENCY MEDICINE

## 2018-08-19 PROCEDURE — 83735 ASSAY OF MAGNESIUM: CPT

## 2018-08-19 PROCEDURE — 6360000002 HC RX W HCPCS: Performed by: EMERGENCY MEDICINE

## 2018-08-19 PROCEDURE — 80048 BASIC METABOLIC PNL TOTAL CA: CPT

## 2018-08-19 PROCEDURE — 84100 ASSAY OF PHOSPHORUS: CPT

## 2018-08-19 PROCEDURE — 2000000000 HC ICU R&B

## 2018-08-19 PROCEDURE — 2060000000 HC ICU INTERMEDIATE R&B

## 2018-08-19 PROCEDURE — 36600 WITHDRAWAL OF ARTERIAL BLOOD: CPT

## 2018-08-19 PROCEDURE — 6360000002 HC RX W HCPCS: Performed by: INTERNAL MEDICINE

## 2018-08-19 PROCEDURE — 93005 ELECTROCARDIOGRAM TRACING: CPT

## 2018-08-19 PROCEDURE — 2580000003 HC RX 258: Performed by: INTERNAL MEDICINE

## 2018-08-19 PROCEDURE — 2500000003 HC RX 250 WO HCPCS: Performed by: EMERGENCY MEDICINE

## 2018-08-19 PROCEDURE — 2500000003 HC RX 250 WO HCPCS: Performed by: INTERNAL MEDICINE

## 2018-08-19 PROCEDURE — 6370000000 HC RX 637 (ALT 250 FOR IP): Performed by: INTERNAL MEDICINE

## 2018-08-19 PROCEDURE — 2700000000 HC OXYGEN THERAPY PER DAY

## 2018-08-19 PROCEDURE — 2500000003 HC RX 250 WO HCPCS: Performed by: FAMILY MEDICINE

## 2018-08-19 PROCEDURE — 82803 BLOOD GASES ANY COMBINATION: CPT

## 2018-08-19 PROCEDURE — 94761 N-INVAS EAR/PLS OXIMETRY MLT: CPT

## 2018-08-19 PROCEDURE — 85025 COMPLETE CBC W/AUTO DIFF WBC: CPT

## 2018-08-19 PROCEDURE — 85018 HEMOGLOBIN: CPT

## 2018-08-19 RX ORDER — DILTIAZEM HYDROCHLORIDE 180 MG/1
180 CAPSULE, COATED, EXTENDED RELEASE ORAL DAILY
Status: DISCONTINUED | OUTPATIENT
Start: 2018-08-19 | End: 2018-08-20

## 2018-08-19 RX ORDER — ADENOSINE 3 MG/ML
12 INJECTION, SOLUTION INTRAVENOUS ONCE
Status: COMPLETED | OUTPATIENT
Start: 2018-08-19 | End: 2018-08-19

## 2018-08-19 RX ORDER — HEPARIN SODIUM 1000 [USP'U]/ML
1200 INJECTION, SOLUTION INTRAVENOUS; SUBCUTANEOUS PRN
Status: DISCONTINUED | OUTPATIENT
Start: 2018-08-19 | End: 2018-08-22 | Stop reason: HOSPADM

## 2018-08-19 RX ORDER — POTASSIUM CHLORIDE 20 MEQ/1
20 TABLET, EXTENDED RELEASE ORAL ONCE
Status: COMPLETED | OUTPATIENT
Start: 2018-08-19 | End: 2018-08-19

## 2018-08-19 RX ORDER — ADENOSINE 3 MG/ML
6 INJECTION, SOLUTION INTRAVENOUS ONCE
Status: COMPLETED | OUTPATIENT
Start: 2018-08-19 | End: 2018-08-19

## 2018-08-19 RX ORDER — DILTIAZEM HYDROCHLORIDE 5 MG/ML
20 INJECTION INTRAVENOUS ONCE
Status: COMPLETED | OUTPATIENT
Start: 2018-08-19 | End: 2018-08-19

## 2018-08-19 RX ORDER — HEPARIN SODIUM 1000 [USP'U]/ML
1000 INJECTION, SOLUTION INTRAVENOUS; SUBCUTANEOUS PRN
Status: DISCONTINUED | OUTPATIENT
Start: 2018-08-19 | End: 2018-08-22 | Stop reason: HOSPADM

## 2018-08-19 RX ADMIN — AMIODARONE HYDROCHLORIDE 1 MG/MIN: 50 INJECTION, SOLUTION INTRAVENOUS at 07:52

## 2018-08-19 RX ADMIN — Medication: at 18:32

## 2018-08-19 RX ADMIN — HEPARIN SODIUM 1200 UNITS: 1000 INJECTION, SOLUTION INTRAVENOUS; SUBCUTANEOUS at 01:34

## 2018-08-19 RX ADMIN — HEPARIN SODIUM 1000 UNITS: 1000 INJECTION, SOLUTION INTRAVENOUS; SUBCUTANEOUS at 01:34

## 2018-08-19 RX ADMIN — POTASSIUM CHLORIDE 20 MEQ: 20 TABLET, EXTENDED RELEASE ORAL at 23:45

## 2018-08-19 RX ADMIN — Medication: at 03:47

## 2018-08-19 RX ADMIN — Medication: at 10:05

## 2018-08-19 RX ADMIN — DILTIAZEM HYDROCHLORIDE 180 MG: 180 CAPSULE, COATED, EXTENDED RELEASE ORAL at 14:20

## 2018-08-19 RX ADMIN — ADENOSINE 12 MG: 3 INJECTION, SOLUTION INTRAVENOUS at 08:30

## 2018-08-19 RX ADMIN — DEXTROSE MONOHYDRATE 150 MG: 50 INJECTION, SOLUTION INTRAVENOUS at 07:40

## 2018-08-19 RX ADMIN — DILTIAZEM HYDROCHLORIDE 5 MG/HR: 5 INJECTION INTRAVENOUS at 08:48

## 2018-08-19 RX ADMIN — ADENOSINE 6 MG: 3 INJECTION, SOLUTION INTRAVENOUS at 08:25

## 2018-08-19 RX ADMIN — DILTIAZEM HYDROCHLORIDE 20 MG: 5 INJECTION INTRAVENOUS at 08:48

## 2018-08-19 RX ADMIN — SODIUM CHLORIDE: 4.5 INJECTION, SOLUTION INTRAVENOUS at 03:47

## 2018-08-19 ASSESSMENT — PAIN SCALES - GENERAL
PAINLEVEL_OUTOF10: 0
PAINLEVEL_OUTOF10: 0

## 2018-08-19 NOTE — ED NOTES
icu rn given a over view of patients condition and treatment given. Patient still waiting for interventional to place a central line. And then icu admission larisa sellers rn updated on events.       Cecille Manning RN  08/18/18 2100

## 2018-08-19 NOTE — CONSULTS
Adriana Connor MD  Urology Consultation    Patient:  Shelby Woods  MRN: [id number]  YOB: 1955    CHIEF COMPLAINT:  Urinary retention    HISTORY OF PRESENT ILLNESS:     The patient is a 61 y.o. male who presents with  And urinary retention and renal failure. Urology consulted for  Urinary retention    Onset was  yesterday  Overall, the problem(s) are worse. Severity is described as profound. Associated Symptoms: No dysuria, no gross hematuria. Current Pain Severity: 0     patient has a history of elevated PSA. Treated by Dr. Nirmal Hanna in the past.  Has had negative prostate biopsies in the past   presented to the emergency room in urinary retention. A Whitmore catheter was placed for over 2 L. The patient had significant postobstructive diuresis and had nearly 6 L of urine output within the next 1-2 hours   his creatinine was 27 and he was emergently dialyzed   he is diuresing and his creatinine is currently improving   he has had worsening lower urinary tract symptoms over time      Patient's old records reviewed. Pertinent patient notes and patient chart reviewed and summarized above. Past Medical History:    Past Medical History:   Diagnosis Date    Elevated PSA     Hypertension     Kidney stone     Seasonal allergies        Past Surgical History:    Past Surgical History:   Procedure Laterality Date    CYSTOSCOPY         Medications:    Scheduled Meds:   diltiazem  180 mg Oral Daily     Continuous Infusions:   sodium bicarbonate infusion 125 mL/hr at 08/19/18 1236    diltiazem (CARDIZEM) 125 mg in dextrose 5% 125 mL infusion 2.5 mg/hr (08/19/18 1445)     PRN Meds:.heparin (porcine), heparin (porcine), lidocaine, hydrALAZINE    Allergies:  Lisinopril    Social History:    Social History     Social History    Marital status:      Spouse name: N/A    Number of children: N/A    Years of education: N/A     Occupational History    Not on file.      Social History Main Topics   

## 2018-08-19 NOTE — PROGRESS NOTES
Dialysis Safety Checks:    Patient ID Verified (Y/N) y     -Hepatitis Test                   Date      Result  Hepatitis B Surface Antigen   18      Hepatitis B Surface Antibody 18      Hepatitis B Core Antibody  18      -Treatment Initiation  Blood Vol Processed Goal (Liters)  Pump Speed x Treatment Hours x 60 Minutes 30.0   Target Fluid Removal 0     * Intra-treatment documented Safety Checks include the followin) Access and face visible at all times. 2) All connections and blood lines are secure with no kinks. 3) NVL alarm engaged. 4) Hemosafe device applied (if applicable). 5) No collapse of Arterial or Venous blood chambers. 6) All blood lines / pump segments in the air detectors.   --------------------------------------------------------------------------------

## 2018-08-19 NOTE — CONSULTS
Surgical History:   Procedure Laterality Date    CYSTOSCOPY         Medications:   Scheduled Meds:  Continuous Infusions:   sodium bicarbonate infusion 150 mL/hr at 08/19/18 1005    diltiazem (CARDIZEM) 125 mg in dextrose 5% 125 mL infusion 5 mg/hr (08/19/18 0848)      Outpatient Prescriptions Marked as Taking for the 8/18/18 encounter [facility] Encounter)   Medication Sig Dispense Refill    DilTIAZem HCl Coated Beads (CARDIZEM CD PO) Take by mouth Unsure of dose      HYDROCHLOROTHIAZIDE PO Take 25 mg by mouth daily          Allergies:   Lisinopril    Social History:    reports that he has never smoked. He has never used smokeless tobacco. He reports that he drinks alcohol. He reports that he does not use drugs. Family History:   MI history is negative for coronary artery disease. Review of Systems:     Constitutional: No fever/chills. HENT: No headache, neck pain or neck stiffnes. No sore throat or dysphagia. Eyes: No blurred vision. Respiratory: As above. Cardiovascular: As above. Gastrointestinal: Negative. Genitourinary: Aileen Bread for history of bladder neck obstruction. Endocrine: Negative. Musculoskeletal: Negative. Skin: Negative. Allergic/Immunologic: Negative. Neurologic: Negative. Hematological: Negative. Psychiatric: Negative. All other systems are are noted to be otherwise negative. Physical Exam:   /76   Pulse 87   Temp 98.9 °F (37.2 °C) (Infrared)   Resp 22   Ht 6' (1.829 m)   Wt 245 lb 2.4 oz (111.2 kg)   SpO2 93%   BMI 33.25 kg/m²     Intake/Output Summary (Last 24 hours) at 08/19/18 1153  Last data filed at 08/19/18 0839   Gross per 24 hour   Intake             2023 ml   Output             6150 ml   Net            -4127 ml       GENERAL:  Alert, appropriate, oriented, in NAD. HEENT:  Head is atraumatic and normocephalic. No Pallor. No icterus. NECK: Supple without any thyromegaly. LUNGS: Generally clear to auscultation  CARDIAC: S1, S2, RRR. ABD:  Soft non-tender . EXT: No edema. MS: No obvious deformities. SKIN: No obvious skin rashes. NEURO: No focal neurologic deficits. Labs/ Ancillary data:     CBC:   Recent Labs      08/18/18 1650 08/18/18 2328 08/19/18 0357   WBC  9.8   --   12.7*   HGB  12.9*  14.9  14.0   PLT  297   --   297     BMP:  Recent Labs      08/18/18 2328 08/19/18 0357 08/19/18   0759   NA  144  142  141   K  4.2  3.9  4.1   CL  103  102  103   CO2  14*  16*  18*   BUN  87*  61*  56*   CREATININE  11.79*  7.49*  6.50*   GLUCOSE  140*  179*  174*     Hepatic: Recent Labs      08/18/18 1650 08/18/18 2328   AST  39  30   ALT  115*  117*   BILITOT  0.40  0.66   ALKPHOS  229*  248*     INR:   Recent Labs      08/18/18 1650   INR  1.2       Imaging:    Echo:Pending    EKG: Multiple EKGs reviewed. Impression :     Supraventricular tachycardia-currently in sinus rhythm. Acute renal failure. Hypertension    Plan :     Agree with current management. Site admitting off the diltiazem drip. Will get an echocardiogram to follow-up on left ventricular function, cardiac structure and valves. Thank you very much for allowing us to participate in the care of this patient. Please call us with any questions.     Electronically signed by Farhana Pike MD on 8/19/2018 at 11:53 AM]

## 2018-08-19 NOTE — FLOWSHEET NOTE
Patient states about Physicians placing him on dialysis. Patient shares about his worries and fears. Patient shares about his medical situation and his feelings. Patient shares about his family and andressa life life.  shares Advanced Directives documents with patient.  shared in presence, listening, communion. Follow up as needed. 08/19/18 1241   Encounter Summary   Services provided to: Patient   Referral/Consult From: [address] [place] [address] Children;Family members   Place of [facility] Completed   Continue Visiting (3-70-38)   Complexity of Encounter Moderate   Length of Encounter 30 minutes   Spiritual Assessment Completed Yes   Routine   Type Initial   Grief and Life Adjustment   Type New Diagnosis   Assessment Passive; Approachable;Calm   Intervention Active listening;Explored feelings, thoughts, concerns;Prayer;Sustaining presence/ Ministry of presence; Discussed illness/injury and it's impact; Discussed belief system/Latter day practices/andressa   Outcome Expressed gratitude;Receptive   Advance Directives (For Healthcare)   Healthcare Directive No, patient does not have an advance directive for healthcare treatment   Advance Directives Documents given;Documents explained

## 2018-08-19 NOTE — CONSULTS
(APRESOLINE) injection 10 mg Q6H PRN   0.45 % sodium chloride infusion Continuous   sodium bicarbonate 75 mEq in sodium chloride 0.45 % 1,000 mL infusion Continuous       Allergies:  Lisinopril    Social History:   Social History     Social History    Marital status:      Spouse name: N/A    Number of children: N/A    Years of education: N/A     Occupational History    Not on file. Social History Main Topics    Smoking status: Never Smoker    Smokeless tobacco: Never Used    Alcohol use Yes      Comment: occasionally    Drug use: No    Sexual activity: Not on file     Other Topics Concern    Not on file     Social History Narrative    No narrative on file       Family History:   History reviewed. No pertinent family history. Review of Systems:    Constitutional: No fever, no chills, no night sweats, fatigue, generalized weakness, loss of appetite  HEENT:  No headache, otalgia, itchy eyes, epistaxis, nasal discharge or sore throat. Cardiac:  No chest pain, dyspnea, orthopnea or PND, palpitations  Chest:  No cough, hemoptysis, pleuritic chest pain, wheezing,SOB  Abdomen:  No abdominal pain, nausea, vomiting, diarrhea, melena, dysphagia hematemesis,constipation, abdominal bloating, flank pain  Neuro:  No CVA, TIA or seizure like activity. Skin:   No rashes, no itching. :   No hematuria, no pyuria, no dysuria, no flank pain. Extremities:  No swelling or joint pains.       Objective:  CURRENT TEMPERATURE:  Temp: 98 °F (36.7 °C)  MAXIMUM TEMPERATURE OVER 24HRS:  Temp (24hrs), Av °F (36.7 °C), Min:98 °F (36.7 °C), Max:98 °F (36.7 °C)    CURRENT RESPIRATORY RATE:  Resp: 17  CURRENT PULSE:  Pulse: 97  CURRENT BLOOD PRESSURE:  BP: (!) 173/86  24HR BLOOD PRESSURE RANGE:  Systolic (28BBZ), PSE:816 , Min:153 , YHP:626   ; Diastolic (66ZCY), DIXIE:737, Min:82, Max:129    24HR INTAKE/OUTPUT:    Intake/Output Summary (Last 24 hours) at 181  Last data filed at 18 4688   Gross per 24 hour   Intake                0 ml   Output             2000 ml   Net            -2000 ml     Patient Vitals for the past 96 hrs (Last 3 readings):   Weight   08/18/18 1612 220 lb (99.8 kg)         Physical Exam:  GENERAL APPEARANCE: Alert and cooperative, and appears to be in no acute distress. HEAD: normocephalic  EYES: PERRL, EOMI. Not pale, anicteric   NOSE:  No nasal discharge. THROAT:  Oral cavity and pharynx normal. Moist  NECK: Neck supple, non-tender without lymphadenopathy, masses or thyromegaly. JVD-neg  CARDIAC: Normal S1 and S2. No S3, S4 or murmurs. Rhythm is regular. LUNGS: Clear to auscultation and percussion without rales, rhonchi, wheezing or diminished breath sounds. ABD-Soft non distended, BS+ Non tender no organomegally  BACK: Examination of the spine reveals  no spinal deformity, without tenderness,   MUSKULOSKELETAL: Adequately aligned spine. No joint erythema or tenderness. EXTREMITIES: No edema. Peripheral pulses intact. NEURO:Alert oriented x 3 ,power 5/5 in all extremities      Labs:   CBC:  Recent Labs      08/18/18   1650   WBC  9.8   RBC  4.94   HGB  12.9*   HCT  40.1*   MCV  81.2   MCH  26.2   MCHC  32.3   RDW  15.4*   PLT  297   MPV  7.8      BMP: Recent Labs      08/18/18 1650  08/18/18 2011   NA  138  142   K  6.0*  4.5   CL  94*  100   CO2  12*  15*   BUN  123*  108*   CREATININE  27.90*  17.52*   GLUCOSE  119*  61*   CALCIUM  8.3*  9.2        Phosphorus:  No results for input(s): PHOS in the last 72 hours. Magnesium: No results for input(s): MG in the last 72 hours.   Albumin:   Recent Labs      08/18/18   1650   LABALBU  4.1       IRON:  No results found for: IRON  Iron Saturation:  No components found for: PERCENTFE  TIBC:  No results found for: TIBC  FERRITIN:  No results found for: FERRITIN  SPEP: Lab Results   Component Value Date    PROT 7.2 08/18/2018     UPEP: No results found for: TPU     C3: No results found for: C3  C4: No results found for: C4  MPO ANCA:

## 2018-08-20 LAB
ABSOLUTE EOS #: 0.1 K/UL (ref 0–0.4)
ABSOLUTE IMMATURE GRANULOCYTE: ABNORMAL K/UL (ref 0–0.3)
ABSOLUTE LYMPH #: 1.2 K/UL (ref 1–4.8)
ABSOLUTE MONO #: 1.5 K/UL (ref 0.2–0.8)
ANION GAP SERPL CALCULATED.3IONS-SCNC: 10 MMOL/L (ref 9–17)
BASOPHILS # BLD: 1 % (ref 0–2)
BASOPHILS ABSOLUTE: 0.1 K/UL (ref 0–0.2)
BUN BLDV-MCNC: 29 MG/DL (ref 8–23)
BUN/CREAT BLD: 14 (ref 9–20)
CALCIUM SERPL-MCNC: 8.2 MG/DL (ref 8.6–10.4)
CHLORIDE BLD-SCNC: 104 MMOL/L (ref 98–107)
CO2: 31 MMOL/L (ref 20–31)
CREAT SERPL-MCNC: 2.02 MG/DL (ref 0.7–1.2)
CULTURE: NO GROWTH
DIFFERENTIAL TYPE: ABNORMAL
EKG ATRIAL RATE: 104 BPM
EKG ATRIAL RATE: 187 BPM
EKG ATRIAL RATE: 208 BPM
EKG ATRIAL RATE: 82 BPM
EKG P AXIS: 54 DEGREES
EKG P AXIS: 56 DEGREES
EKG P-R INTERVAL: 138 MS
EKG P-R INTERVAL: 156 MS
EKG Q-T INTERVAL: 262 MS
EKG Q-T INTERVAL: 268 MS
EKG Q-T INTERVAL: 370 MS
EKG Q-T INTERVAL: 418 MS
EKG QRS DURATION: 100 MS
EKG QRS DURATION: 104 MS
EKG QRS DURATION: 162 MS
EKG QRS DURATION: 86 MS
EKG QTC CALCULATION (BAZETT): 463 MS
EKG QTC CALCULATION (BAZETT): 475 MS
EKG QTC CALCULATION (BAZETT): 486 MS
EKG QTC CALCULATION (BAZETT): 488 MS
EKG R AXIS: -59 DEGREES
EKG R AXIS: -66 DEGREES
EKG R AXIS: -73 DEGREES
EKG R AXIS: -87 DEGREES
EKG T AXIS: 26 DEGREES
EKG T AXIS: 47 DEGREES
EKG T AXIS: 48 DEGREES
EKG T AXIS: 60 DEGREES
EKG VENTRICULAR RATE: 104 BPM
EKG VENTRICULAR RATE: 188 BPM
EKG VENTRICULAR RATE: 189 BPM
EKG VENTRICULAR RATE: 82 BPM
EOSINOPHILS RELATIVE PERCENT: 1 % (ref 1–4)
GFR AFRICAN AMERICAN: 41 ML/MIN
GFR NON-AFRICAN AMERICAN: 34 ML/MIN
GFR SERPL CREATININE-BSD FRML MDRD: ABNORMAL ML/MIN/{1.73_M2}
GFR SERPL CREATININE-BSD FRML MDRD: ABNORMAL ML/MIN/{1.73_M2}
GLUCOSE BLD-MCNC: 182 MG/DL (ref 70–99)
HCT VFR BLD CALC: 39.6 % (ref 41–53)
HEMOGLOBIN: 12.8 G/DL (ref 13.5–17.5)
IMMATURE GRANULOCYTES: ABNORMAL %
LV EF: 70 %
LVEF MODALITY: NORMAL
LYMPHOCYTES # BLD: 10 % (ref 24–44)
Lab: NORMAL
MAGNESIUM: 1.9 MG/DL (ref 1.6–2.6)
MCH RBC QN AUTO: 26.5 PG (ref 26–34)
MCHC RBC AUTO-ENTMCNC: 32.3 G/DL (ref 31–37)
MCV RBC AUTO: 82 FL (ref 80–100)
MONOCYTES # BLD: 13 % (ref 1–7)
NRBC AUTOMATED: ABNORMAL PER 100 WBC
PDW BLD-RTO: 15.8 % (ref 11.5–14.5)
PHOSPHORUS: 3.2 MG/DL (ref 2.5–4.5)
PLATELET # BLD: 248 K/UL (ref 130–400)
PLATELET ESTIMATE: ABNORMAL
PMV BLD AUTO: 7.6 FL (ref 6–12)
POTASSIUM SERPL-SCNC: 3.8 MMOL/L (ref 3.7–5.3)
RBC # BLD: 4.83 M/UL (ref 4.5–5.9)
RBC # BLD: ABNORMAL 10*6/UL
SEG NEUTROPHILS: 75 % (ref 36–66)
SEGMENTED NEUTROPHILS ABSOLUTE COUNT: 9.1 K/UL (ref 1.8–7.7)
SODIUM BLD-SCNC: 145 MMOL/L (ref 135–144)
SPECIMEN DESCRIPTION: NORMAL
STATUS: NORMAL
WBC # BLD: 11.9 K/UL (ref 3.5–11)
WBC # BLD: ABNORMAL 10*3/UL

## 2018-08-20 PROCEDURE — 6360000002 HC RX W HCPCS: Performed by: EMERGENCY MEDICINE

## 2018-08-20 PROCEDURE — 2500000003 HC RX 250 WO HCPCS: Performed by: INTERNAL MEDICINE

## 2018-08-20 PROCEDURE — 2580000003 HC RX 258: Performed by: INTERNAL MEDICINE

## 2018-08-20 PROCEDURE — 83735 ASSAY OF MAGNESIUM: CPT

## 2018-08-20 PROCEDURE — 6360000002 HC RX W HCPCS: Performed by: FAMILY MEDICINE

## 2018-08-20 PROCEDURE — 93005 ELECTROCARDIOGRAM TRACING: CPT

## 2018-08-20 PROCEDURE — 2000000000 HC ICU R&B

## 2018-08-20 PROCEDURE — 6370000000 HC RX 637 (ALT 250 FOR IP): Performed by: INTERNAL MEDICINE

## 2018-08-20 PROCEDURE — 84100 ASSAY OF PHOSPHORUS: CPT

## 2018-08-20 PROCEDURE — 2060000000 HC ICU INTERMEDIATE R&B

## 2018-08-20 PROCEDURE — 6370000000 HC RX 637 (ALT 250 FOR IP): Performed by: UROLOGY

## 2018-08-20 PROCEDURE — 83036 HEMOGLOBIN GLYCOSYLATED A1C: CPT

## 2018-08-20 PROCEDURE — 93306 TTE W/DOPPLER COMPLETE: CPT

## 2018-08-20 PROCEDURE — 36415 COLL VENOUS BLD VENIPUNCTURE: CPT

## 2018-08-20 PROCEDURE — 80048 BASIC METABOLIC PNL TOTAL CA: CPT

## 2018-08-20 PROCEDURE — 85025 COMPLETE CBC W/AUTO DIFF WBC: CPT

## 2018-08-20 RX ORDER — ONDANSETRON 2 MG/ML
4 INJECTION INTRAMUSCULAR; INTRAVENOUS EVERY 6 HOURS PRN
Status: DISCONTINUED | OUTPATIENT
Start: 2018-08-20 | End: 2018-08-22 | Stop reason: HOSPADM

## 2018-08-20 RX ORDER — DILTIAZEM HYDROCHLORIDE 120 MG/1
120 CAPSULE, COATED, EXTENDED RELEASE ORAL DAILY
Status: DISCONTINUED | OUTPATIENT
Start: 2018-08-20 | End: 2018-08-21

## 2018-08-20 RX ORDER — CIPROFLOXACIN 250 MG/1
250 TABLET, FILM COATED ORAL
Status: DISCONTINUED | OUTPATIENT
Start: 2018-08-20 | End: 2018-08-22 | Stop reason: HOSPADM

## 2018-08-20 RX ORDER — TAMSULOSIN HYDROCHLORIDE 0.4 MG/1
0.4 CAPSULE ORAL DAILY
Status: DISCONTINUED | OUTPATIENT
Start: 2018-08-20 | End: 2018-08-22 | Stop reason: HOSPADM

## 2018-08-20 RX ORDER — ADENOSINE 3 MG/ML
12 INJECTION, SOLUTION INTRAVENOUS ONCE
Status: COMPLETED | OUTPATIENT
Start: 2018-08-20 | End: 2018-08-20

## 2018-08-20 RX ORDER — ONDANSETRON 4 MG/1
4 TABLET, ORALLY DISINTEGRATING ORAL EVERY 6 HOURS PRN
Status: DISCONTINUED | OUTPATIENT
Start: 2018-08-20 | End: 2018-08-22 | Stop reason: HOSPADM

## 2018-08-20 RX ORDER — SODIUM CHLORIDE 450 MG/100ML
INJECTION, SOLUTION INTRAVENOUS CONTINUOUS
Status: DISCONTINUED | OUTPATIENT
Start: 2018-08-20 | End: 2018-08-22

## 2018-08-20 RX ORDER — HYDRALAZINE HYDROCHLORIDE 20 MG/ML
10 INJECTION INTRAMUSCULAR; INTRAVENOUS EVERY 6 HOURS PRN
Status: DISCONTINUED | OUTPATIENT
Start: 2018-08-20 | End: 2018-08-22 | Stop reason: HOSPADM

## 2018-08-20 RX ORDER — ADENOSINE 3 MG/ML
6 INJECTION, SOLUTION INTRAVENOUS ONCE
Status: COMPLETED | OUTPATIENT
Start: 2018-08-20 | End: 2018-08-20

## 2018-08-20 RX ORDER — DILTIAZEM HYDROCHLORIDE 240 MG/1
240 CAPSULE, COATED, EXTENDED RELEASE ORAL DAILY
Status: DISCONTINUED | OUTPATIENT
Start: 2018-08-21 | End: 2018-08-21

## 2018-08-20 RX ORDER — ONDANSETRON 2 MG/ML
4 INJECTION INTRAMUSCULAR; INTRAVENOUS EVERY 6 HOURS PRN
Status: DISCONTINUED | OUTPATIENT
Start: 2018-08-20 | End: 2018-08-20 | Stop reason: SDUPTHER

## 2018-08-20 RX ORDER — LABETALOL HYDROCHLORIDE 5 MG/ML
20 INJECTION, SOLUTION INTRAVENOUS EVERY 4 HOURS PRN
Status: DISCONTINUED | OUTPATIENT
Start: 2018-08-20 | End: 2018-08-22 | Stop reason: HOSPADM

## 2018-08-20 RX ADMIN — DILTIAZEM HYDROCHLORIDE 180 MG: 180 CAPSULE, COATED, EXTENDED RELEASE ORAL at 06:18

## 2018-08-20 RX ADMIN — ADENOSINE 12 MG: 3 INJECTION, SOLUTION INTRAVENOUS at 04:52

## 2018-08-20 RX ADMIN — Medication: at 01:05

## 2018-08-20 RX ADMIN — ADENOSINE 6 MG: 3 INJECTION, SOLUTION INTRAVENOUS at 04:52

## 2018-08-20 RX ADMIN — HYDRALAZINE HYDROCHLORIDE 10 MG: 20 INJECTION INTRAMUSCULAR; INTRAVENOUS at 01:05

## 2018-08-20 RX ADMIN — DILTIAZEM HYDROCHLORIDE 120 MG: 120 CAPSULE, COATED, EXTENDED RELEASE ORAL at 20:13

## 2018-08-20 RX ADMIN — SODIUM CHLORIDE: 4.5 INJECTION, SOLUTION INTRAVENOUS at 09:10

## 2018-08-20 RX ADMIN — TAMSULOSIN HYDROCHLORIDE 0.4 MG: 0.4 CAPSULE ORAL at 19:00

## 2018-08-20 RX ADMIN — CIPROFLOXACIN 250 MG: 250 TABLET, FILM COATED ORAL at 19:00

## 2018-08-20 RX ADMIN — SODIUM CHLORIDE: 4.5 INJECTION, SOLUTION INTRAVENOUS at 16:45

## 2018-08-20 RX ADMIN — SODIUM CHLORIDE: 4.5 INJECTION, SOLUTION INTRAVENOUS at 23:26

## 2018-08-20 RX ADMIN — HYDRALAZINE HYDROCHLORIDE 10 MG: 20 INJECTION INTRAMUSCULAR; INTRAVENOUS at 16:39

## 2018-08-20 ASSESSMENT — PAIN SCALES - GENERAL
PAINLEVEL_OUTOF10: 0
PAINLEVEL_OUTOF10: 0

## 2018-08-20 NOTE — PROGRESS NOTES
Around 0586 pt became tachycardic in the 140s-190s while sitting on the bedside commode. Pt was asymptomatic. Writer and another RN assisted pt back to bed and obtained an EKG (See results). Cardizem gtt was restarted. Pt HR was not improving and SBP dropped from systolic in 729Y to systolic in high 52L to 10A. At this time Dr. Burgess Dominguez was asked to come evaluate pt. At [address] Rob Gerardo arrived to pt room. At 0441 6mg of adenosine was pushed. Pt did not respond to dose. At 0442 12mg adenosine given. Pt HR responded to medication. Pt remained A&O x4 throughout episode and medication administration.

## 2018-08-20 NOTE — PROGRESS NOTES
BUN/Creatinine continues to improve from baseline. Hemodynamically stable. NSR, no ectopics. Current labs reviewed. Awaiting for Dr. Mir Snyder to evaluate patient. continuing current regimen. Glucose levels elevated - HbA1C ordered.

## 2018-08-20 NOTE — PLAN OF CARE
Problem: Falls - Risk of:  Goal: Will remain free from falls  Will remain free from falls   Outcome: Ongoing  Pt remained free from falls per my shift. Bed is low and locked with siderails up x2. Bed alarm in use. Patient calls out appropriately. Will continue to monitor.

## 2018-08-20 NOTE — PROGRESS NOTES
Susu Rhodes                                                                                  Urology Progress Note            Subjective:  Follow-up urinary retention bladder outlet obstruction    Patient Vitals for the past 24 hrs:   BP Temp Temp src Pulse Resp SpO2   08/20/18 1430 (!) 152/80 - - 68 19 95 %   08/20/18 1400 (!) 159/88 - - 77 19 94 %   08/20/18 1330 (!) 157/86 - - 75 19 94 %   08/20/18 1300 (!) 160/80 - - 76 21 94 %   08/20/18 1230 (!) 161/82 - - 76 16 93 %   08/20/18 1200 (!) 140/76 97.9 °F (36.6 °C) Infrared 67 15 93 %   08/20/18 1130 (!) 160/85 - - 77 17 95 %   08/20/18 1100 (!) 141/67 - - 85 25 96 %   08/20/18 1030 (!) 141/73 - - 81 14 95 %   08/20/18 1000 120/62 - - 82 15 94 %   08/20/18 0930 128/75 - - 89 26 95 %   08/20/18 0900 (!) 135/106 - - 81 19 92 %   08/20/18 0845 (!) 145/81 - - 69 14 100 %   08/20/18 0800 (!) 141/76 98.6 °F (37 °C) Infrared 83 17 96 %   08/20/18 0730 116/68 - - 85 13 97 %   08/20/18 0700 109/73 - - 85 18 96 %   08/20/18 0630 124/74 - - 82 18 -   08/20/18 0600 129/67 - - 83 18 98 %   08/20/18 0530 111/70 - - 82 19 98 %   08/20/18 0500 (!) 131/97 - - 85 12 90 %   08/20/18 0425 - - - 150 - -   08/20/18 0403 (!) 157/89 98.8 °F (37.1 °C) Infrared 76 23 94 %   08/20/18 0400 - - - 76 - 95 %   08/20/18 0300 128/73 - - 75 12 94 %   08/20/18 0200 (!) 141/91 - - 80 14 95 %   08/20/18 0105 (!) 162/93 - - - - -   08/20/18 0100 (!) 166/86 - - 70 14 93 %   08/20/18 0000 137/75 97.7 °F (36.5 °C) Infrared 69 13 93 %   08/19/18 2300 135/87 - - 75 18 96 %   08/19/18 2200 (!) 141/70 - - 77 15 91 %   08/19/18 2100 (!) 144/94 - - 72 23 93 %   08/19/18 2000 (!) 141/73 - - 73 16 -   08/19/18 1951 (!) 152/76 98.1 °F (36.7 °C) Infrared 75 17 93 %   08/19/18 1700 (!) 114/91 - - 78 - 92 %       Intake/Output Summary (Last 24 hours) at 08/20/18 1653  Last data filed at 08/20/18 1200   Gross per 24 hour   Intake          4830.46 ml   Output             6050 ml

## 2018-08-20 NOTE — FLOWSHEET NOTE
Patient receives Sacrament of the Sick (anointing) from [facility]. [facility] will follow as needed. (writer charting for [email or web address].)     64/16/05 0992   Encounter Summary   Services provided to: Patient   Referral/Consult From: Rounding   Continue Visiting (8/20/18 anointed)   Complexity of Encounter Low   Length of Encounter 15 minutes   Routine   Type Follow up   Sacraments   Sacrament of Sick-Anointing Anointed  (8/20/18 Fr. Jessica Morrison)

## 2018-08-20 NOTE — PROGRESS NOTES
Nephrology Progress Note    Subjective: This is a 61 y.o. male with history of hypertension and remote history of kidney stone [in 1990], who presented to the ER with frequency of urination. He reports developing frequent urination about 2 weeks ago and nocturia several times during the night. He felt he was having a kidney stone but he denied any flank pain or gross hematuria. About a week ago he noticed decreased urination, weak stream and straining to pass urine. He would go to the bathroom and would notice  no outflow of urine. He denied any dysuria or history of recurrent UTI although he experienced some fever and chills. He reports having chronically elevated PSA and had a prostate biopsy in 2010 by Dr. Whyte which was negative for CA prostate. He states his urologist retired and he did not follow-up for several years. At presentation, laboratory studies were remarkable for BUN/creatinine 123/27 mg/dL, serum bicarbonate 12 mmol/L and potassium 6 mmol/L. PSA level was 16. His last serum creatinine was 1.12 mg/dL in February 2018. He had Whitmore catheter placed in the ER with brisk diuresis of 2000 mL of bloody urine. He received D50 insulin in the ER for treatment of hyperkalemia. EKG did not show peaked T waves or prolonged QRS.    Interval History: Patient was seen and examined today in the intensive care unit and he does not have palpitations, chest pain or shortness of breath. He is normal oliguric with urine output greater than 150 mL/h. He has a IJ Robert catheter true which he received one treatment of acute hemodialysis for treatment of hyperkalemia and acidosis in the setting of severe azotemia.     Objective/     Vitals:    08/20/18 0500 08/20/18 0530 08/20/18 0600 08/20/18 0630   BP: (!) 131/97 111/70 129/67 124/74   Pulse: 85 82 83 82   Resp: 12 19 18 18   Temp:       TempSrc:       SpO2: 90% 98% 98%    Weight:       Height:         24HR INTAKE/OUTPUT:      Intake/Output Summary Increased anion gap metabolic acidosis - Secondary to acute kidney injury. 4.  Systemic hypertension - controlled. 5.  Status post SVT - Now in normal sinus rhythm. 6.  Gross hematuria - Consequent to rapid bladder decompression-rule out bladder tumor.  Cystoscopy pending.          68 Sony Bateman  Attending Nephrologist

## 2018-08-21 PROBLEM — E11.9 TYPE 2 DIABETES MELLITUS (HCC): Status: ACTIVE | Noted: 2018-08-21

## 2018-08-21 LAB
ABSOLUTE EOS #: 0.4 K/UL (ref 0–0.4)
ABSOLUTE IMMATURE GRANULOCYTE: ABNORMAL K/UL (ref 0–0.3)
ABSOLUTE LYMPH #: 1.7 K/UL (ref 1–4.8)
ABSOLUTE MONO #: 1.2 K/UL (ref 0.2–0.8)
ANION GAP SERPL CALCULATED.3IONS-SCNC: 13 MMOL/L (ref 9–17)
BASOPHILS # BLD: 1 % (ref 0–2)
BASOPHILS ABSOLUTE: 0.1 K/UL (ref 0–0.2)
BUN BLDV-MCNC: 13 MG/DL (ref 8–23)
BUN/CREAT BLD: 11 (ref 9–20)
CALCIUM SERPL-MCNC: 8.1 MG/DL (ref 8.6–10.4)
CHLORIDE BLD-SCNC: 103 MMOL/L (ref 98–107)
CO2: 24 MMOL/L (ref 20–31)
CREAT SERPL-MCNC: 1.18 MG/DL (ref 0.7–1.2)
DIFFERENTIAL TYPE: ABNORMAL
EKG ATRIAL RATE: 202 BPM
EKG Q-T INTERVAL: 244 MS
EKG QRS DURATION: 158 MS
EKG QTC CALCULATION (BAZETT): 446 MS
EKG R AXIS: -96 DEGREES
EKG T AXIS: 19 DEGREES
EKG VENTRICULAR RATE: 201 BPM
EOSINOPHILS RELATIVE PERCENT: 4 % (ref 1–4)
ESTIMATED AVERAGE GLUCOSE: 192 MG/DL
GFR AFRICAN AMERICAN: >60 ML/MIN
GFR NON-AFRICAN AMERICAN: >60 ML/MIN
GFR SERPL CREATININE-BSD FRML MDRD: ABNORMAL ML/MIN/{1.73_M2}
GFR SERPL CREATININE-BSD FRML MDRD: ABNORMAL ML/MIN/{1.73_M2}
GLUCOSE BLD-MCNC: 138 MG/DL (ref 70–99)
HBA1C MFR BLD: 8.3 % (ref 4–6)
HCT VFR BLD CALC: 36.3 % (ref 41–53)
HEMOGLOBIN: 11.6 G/DL (ref 13.5–17.5)
IMMATURE GRANULOCYTES: ABNORMAL %
LYMPHOCYTES # BLD: 14 % (ref 24–44)
MCH RBC QN AUTO: 26.2 PG (ref 26–34)
MCHC RBC AUTO-ENTMCNC: 31.9 G/DL (ref 31–37)
MCV RBC AUTO: 82 FL (ref 80–100)
MONOCYTES # BLD: 10 % (ref 1–7)
NRBC AUTOMATED: ABNORMAL PER 100 WBC
PDW BLD-RTO: 15.5 % (ref 11.5–14.5)
PLATELET # BLD: 229 K/UL (ref 130–400)
PLATELET ESTIMATE: ABNORMAL
PMV BLD AUTO: 7.6 FL (ref 6–12)
POTASSIUM SERPL-SCNC: 3.8 MMOL/L (ref 3.7–5.3)
PROSTATE SPECIFIC ANTIGEN: 113.8 UG/L
RBC # BLD: 4.42 M/UL (ref 4.5–5.9)
RBC # BLD: ABNORMAL 10*6/UL
SEG NEUTROPHILS: 71 % (ref 36–66)
SEGMENTED NEUTROPHILS ABSOLUTE COUNT: 8.7 K/UL (ref 1.8–7.7)
SODIUM BLD-SCNC: 140 MMOL/L (ref 135–144)
WBC # BLD: 12 K/UL (ref 3.5–11)
WBC # BLD: ABNORMAL 10*3/UL

## 2018-08-21 PROCEDURE — G8978 MOBILITY CURRENT STATUS: HCPCS

## 2018-08-21 PROCEDURE — 6370000000 HC RX 637 (ALT 250 FOR IP): Performed by: UROLOGY

## 2018-08-21 PROCEDURE — 97530 THERAPEUTIC ACTIVITIES: CPT

## 2018-08-21 PROCEDURE — 84153 ASSAY OF PSA TOTAL: CPT

## 2018-08-21 PROCEDURE — 6370000000 HC RX 637 (ALT 250 FOR IP): Performed by: INTERNAL MEDICINE

## 2018-08-21 PROCEDURE — 2580000003 HC RX 258: Performed by: INTERNAL MEDICINE

## 2018-08-21 PROCEDURE — 97161 PT EVAL LOW COMPLEX 20 MIN: CPT

## 2018-08-21 PROCEDURE — 36415 COLL VENOUS BLD VENIPUNCTURE: CPT

## 2018-08-21 PROCEDURE — 85025 COMPLETE CBC W/AUTO DIFF WBC: CPT

## 2018-08-21 PROCEDURE — 80048 BASIC METABOLIC PNL TOTAL CA: CPT

## 2018-08-21 PROCEDURE — 2580000003 HC RX 258: Performed by: EMERGENCY MEDICINE

## 2018-08-21 PROCEDURE — G8979 MOBILITY GOAL STATUS: HCPCS

## 2018-08-21 PROCEDURE — 2060000000 HC ICU INTERMEDIATE R&B

## 2018-08-21 PROCEDURE — 2500000003 HC RX 250 WO HCPCS: Performed by: EMERGENCY MEDICINE

## 2018-08-21 PROCEDURE — 97116 GAIT TRAINING THERAPY: CPT

## 2018-08-21 RX ORDER — DILTIAZEM HYDROCHLORIDE 180 MG/1
180 CAPSULE, COATED, EXTENDED RELEASE ORAL DAILY
Status: ON HOLD | COMMUNITY
End: 2018-08-22 | Stop reason: HOSPADM

## 2018-08-21 RX ORDER — DILTIAZEM HYDROCHLORIDE 120 MG/1
120 CAPSULE, COATED, EXTENDED RELEASE ORAL ONCE
Status: COMPLETED | OUTPATIENT
Start: 2018-08-21 | End: 2018-08-21

## 2018-08-21 RX ORDER — ELECTROLYTES/DEXTROSE
1 SOLUTION, ORAL ORAL DAILY
COMMUNITY

## 2018-08-21 RX ADMIN — DILTIAZEM HYDROCHLORIDE 120 MG: 120 CAPSULE, COATED, EXTENDED RELEASE ORAL at 16:17

## 2018-08-21 RX ADMIN — SODIUM CHLORIDE: 4.5 INJECTION, SOLUTION INTRAVENOUS at 06:11

## 2018-08-21 RX ADMIN — DILTIAZEM HYDROCHLORIDE 240 MG: 240 CAPSULE, COATED, EXTENDED RELEASE ORAL at 08:47

## 2018-08-21 RX ADMIN — CIPROFLOXACIN 250 MG: 250 TABLET, FILM COATED ORAL at 08:47

## 2018-08-21 RX ADMIN — DILTIAZEM HYDROCHLORIDE 5 MG/HR: 5 INJECTION INTRAVENOUS at 03:59

## 2018-08-21 RX ADMIN — TAMSULOSIN HYDROCHLORIDE 0.4 MG: 0.4 CAPSULE ORAL at 08:48

## 2018-08-21 NOTE — PROGRESS NOTES
08/20/18 1100 (!) 141/67 - - 85 25 96 %   08/20/18 1030 (!) 141/73 - - 81 14 95 %   08/20/18 1000 120/62 - - 82 15 94 %   08/20/18 0930 128/75 - - 89 26 95 %   08/20/18 0900 (!) 135/106 - - 81 19 92 %   08/20/18 0845 (!) 145/81 - - 69 14 100 %   08/20/18 0800 (!) 141/76 98.6 °F (37 °C) Infrared 83 17 96 %   08/20/18 0730 116/68 - - 85 13 97 %   08/20/18 0700 109/73 - - 85 18 96 %   08/20/18 0630 124/74 - - 82 18 -   08/20/18 0600 129/67 - - 83 18 98 %   08/20/18 0530 111/70 - - 82 19 98 %   08/20/18 0500 (!) 131/97 - - 85 12 90 %       Intake/Output Summary (Last 24 hours) at 08/21/18 0452  Last data filed at 08/20/18 2334   Gross per 24 hour   Intake             4082 ml   Output             5300 ml   Net            -1218 ml       Recent Labs      08/18/18   1650   08/19/18   0357  08/19/18   1355  08/19/18 1952 08/20/18   0359   WBC  9.8   --   12.7*   --    --   11.9*   HGB  12.9*   < >  14.0  13.4*  12.3*  12.8*   HCT  40.1*   < >  43.5  40.5*  37.3*  39.6*   MCV  81.2   --   81.0   --    --   82.0   PLT  297   --   297   --    --   248    < > = values in this interval not displayed. Recent Labs      08/19/18   1355  08/19/18 1952 08/20/18   0359   NA  144  142  145*   K  3.9  3.5*  3.8   CL  105  103  104   CO2  22  26  31   PHOS  4.6*  3.3  3.2   BUN  50*  41*  29*   CREATININE  4.39*  3.14*  2.02*       No results for input(s): COLORU, PHUR, LABCAST, WBCUA, RBCUA, MUCUS, TRICHOMONAS, YEAST, BACTERIA, CLARITYU, SPECGRAV, LEUKOCYTESUR, UROBILINOGEN, BILIRUBINUR, BLOODU in the last 72 hours.     Invalid input(s): NITRATE, GLUCOSEUKETONESUAMORPHOUS    Additional Lab/culture results:    Physical Exam: Patient alert and oriented, not in acute distress, Serum creatinine Continues to improve and improvement In general status with concerned about SVT reported yesterday  and reported again today we will await cardiology input    Interval Imaging Findings:    Impression:Urinary retention acute kidney injury, elevated PSA    Patient Active Problem List   Diagnosis    ESRD (end stage renal disease) ([facility]    Urinary retention    Elevated PSA    Hyperkalemia    High anion gap metabolic acidosis    Hypertension    Renal failure    Obstructive uropathy    Hydronephrosis    SVT (supraventricular tachycardia) (HCC)    Hypertrophy of prostate with urinary retention    Incomplete RBBB       Plan: Discussed with the patient, keeping the catheter indwelling, for 1 week,  We will see the patient in the office, repeat PSA and cystoscopy will be scheduled With ultrasound-guided biopsy    Hui Romo  4:52 AM 8/21/2018

## 2018-08-21 NOTE — PROGRESS NOTES
Secondary to acute kidney injury. 4.  Systemic hypertension - controlled. 5.  Status post SVT - Now in normal sinus rhythm. 6.  Gross hematuria - Consequent to rapid bladder decompression-rule out bladder tumor.  Cystoscopy pending.          68 Sony Bateman  Attending Nephrologist

## 2018-08-21 NOTE — PROGRESS NOTES
History  Social/Functional History  Lives With: Other (comment) (10 & 15 y/o children)  Type of Home: House  Home Layout: One level  Home Access: Stairs to enter without rails  Entrance Stairs - Number of Steps: 2  Bathroom Shower/Tub: Tub/Shower unit  Bathroom Toilet: Standard  Bathroom Equipment: Grab bars in shower, Shower chair  ADL Assistance: [facility]: [facility] Responsibilities: Yes  Ambulation Assistance: Independent  Transfer Assistance: Independent  Active : Yes  Mode of Transportation: Car  Occupation: Works at home  Type of occupation: computer support  Objective          AROM RLE (degrees)  RLE AROM: WFL  AROM LLE (degrees)  LLE AROM : WFL  AROM RUE (degrees)  RUE AROM : WFL  AROM LUE (degrees)  LUE AROM : WFL  Strength RLE  Strength RLE: WFL  Strength LLE  Strength LLE: WFL  Strength RUE  Strength RUE: WFL  Strength LUE  Strength LUE: WFL  Coordination  Movements Are Fluid And Coordinated: Yes  Motor Control  Gross Motor?: WFL  Sensation  Overall Sensation Status: Impaired (reports numbness at right thigh)  Bed mobility  Rolling to Left: Supervision  Rolling to Right: Supervision  Supine to Sit: Contact guard assistance  Scooting: Contact guard assistance  Comment: Ed for awareness of line management   Pt sat at EOB for 10 minutes to rest after bed mobility & prepare multiple lines for standing & ambulation.     Transfers  Sit to Stand: Contact guard assistance  Stand to sit: Contact guard assistance  Bed to Chair: Contact guard assistance  Stand Pivot Transfers: Contact guard assistance  Lateral Transfers: Contact guard assistance  Comment: Ed for use of upper body for safe sit & stand  Ambulation  Ambulation?: Yes  Ambulation 1  Surface: level tile  Device: No Device  Assistance: Contact guard assistance  Quality of Gait: step to pattern, slow & guarded  Distance: 100ft      Pt assisted to EOB after gait, rested 3 minutes then stood & ambulated from bed to chair with Contact Guard Assistance due to awareness for line management   All lines intact, call light within reach, and patient positioned comfortably at end of treatment. All patient needs addressed prior to ending therapy session. Assessment   Body structures, Functions, Activity limitations: Decreased functional mobility ; Decreased endurance  Assessment: Pt requires continued PT for hospital stay to restore independence with functional mobility, pt education & activity tolerance. Prognosis: Excellent  Decision Making: Low Complexity  Exam:  ROM, MMT, functional mobility, activity tolerance, Balance, & [employer] 6 Clicks Basic Mobility     Clinical Presentation: stable  Patient Education: PT POC, functional mobility, safety awareness, prevention of sedentary complications  Barriers to Learning: none  REQUIRES PT FOLLOW UP: Yes  Activity Tolerance  Activity Tolerance: Patient limited by endurance         Plan   Plan  Current Treatment Recommendations: Strengthening, Balance Training, Functional Mobility Training, Transfer Training, Gait Training, Endurance Training, Home Exercise Program, Safety Education & Training, Patient/Caregiver Education & Training  Safety Devices  Type of devices: Call light within reach, Gait belt, Patient at risk for falls, Left in chair, Nurse notified    G-Code  PT G-Codes  Functional Assessment Tool Used: [facility]-PAC  Score: 17  Functional Limitation: Mobility: Walking and moving around  Mobility: Walking and Moving Around Current Status ():  At least 40 percent but less than 60 percent impaired, limited or restricted  Mobility: Walking and Moving Around Goal Status (): 0 percent impaired, limited or restricted  OutComes Score                                           -PAC Score  -PAC Inpatient Mobility Raw Score : 17  -PAC Inpatient T-Scale Score : 42.13  Mobility Inpatient CMS 0-100% Score: 50.57  Mobility Inpatient CMS G-Code Modifier

## 2018-08-21 NOTE — PROGRESS NOTES
Renal function much improved. Alert, in no distress. Episode of SVT - Now in NSR. cardiology on board - EP studies. HbA1C 8.3 - discussed with patient. Diet, Diabetic education. Patient prefers no pharmacologic intervention at this time. Stable for transfer to [facility].

## 2018-08-22 VITALS
WEIGHT: 255.73 LBS | RESPIRATION RATE: 16 BRPM | TEMPERATURE: 98.2 F | OXYGEN SATURATION: 92 % | HEIGHT: 72 IN | DIASTOLIC BLOOD PRESSURE: 83 MMHG | BODY MASS INDEX: 34.64 KG/M2 | HEART RATE: 64 BPM | SYSTOLIC BLOOD PRESSURE: 146 MMHG

## 2018-08-22 LAB
ABSOLUTE EOS #: 0.6 K/UL (ref 0–0.4)
ABSOLUTE IMMATURE GRANULOCYTE: ABNORMAL K/UL (ref 0–0.3)
ABSOLUTE LYMPH #: 1.4 K/UL (ref 1–4.8)
ABSOLUTE MONO #: 1 K/UL (ref 0.2–0.8)
ANION GAP SERPL CALCULATED.3IONS-SCNC: 9 MMOL/L (ref 9–17)
BASOPHILS # BLD: 0 % (ref 0–2)
BASOPHILS ABSOLUTE: 0 K/UL (ref 0–0.2)
BUN BLDV-MCNC: 13 MG/DL (ref 8–23)
BUN/CREAT BLD: 10 (ref 9–20)
CALCIUM SERPL-MCNC: 8.6 MG/DL (ref 8.6–10.4)
CHLORIDE BLD-SCNC: 104 MMOL/L (ref 98–107)
CO2: 25 MMOL/L (ref 20–31)
CREAT SERPL-MCNC: 1.29 MG/DL (ref 0.7–1.2)
DIFFERENTIAL TYPE: ABNORMAL
EOSINOPHILS RELATIVE PERCENT: 6 % (ref 1–4)
GFR AFRICAN AMERICAN: >60 ML/MIN
GFR NON-AFRICAN AMERICAN: 56 ML/MIN
GFR SERPL CREATININE-BSD FRML MDRD: ABNORMAL ML/MIN/{1.73_M2}
GFR SERPL CREATININE-BSD FRML MDRD: ABNORMAL ML/MIN/{1.73_M2}
GLUCOSE BLD-MCNC: 127 MG/DL (ref 70–99)
HCT VFR BLD CALC: 37.6 % (ref 41–53)
HEMOGLOBIN: 12 G/DL (ref 13.5–17.5)
IMMATURE GRANULOCYTES: ABNORMAL %
LYMPHOCYTES # BLD: 13 % (ref 24–44)
MAGNESIUM: 1.6 MG/DL (ref 1.6–2.6)
MCH RBC QN AUTO: 26.3 PG (ref 26–34)
MCHC RBC AUTO-ENTMCNC: 31.9 G/DL (ref 31–37)
MCV RBC AUTO: 82.5 FL (ref 80–100)
MONOCYTES # BLD: 9 % (ref 1–7)
NRBC AUTOMATED: ABNORMAL PER 100 WBC
PDW BLD-RTO: 15.5 % (ref 11.5–14.5)
PHOSPHORUS: 2.3 MG/DL (ref 2.5–4.5)
PLATELET # BLD: 249 K/UL (ref 130–400)
PLATELET ESTIMATE: ABNORMAL
PMV BLD AUTO: 7.3 FL (ref 6–12)
POTASSIUM SERPL-SCNC: 4.4 MMOL/L (ref 3.7–5.3)
RBC # BLD: 4.55 M/UL (ref 4.5–5.9)
RBC # BLD: ABNORMAL 10*6/UL
SEG NEUTROPHILS: 72 % (ref 36–66)
SEGMENTED NEUTROPHILS ABSOLUTE COUNT: 7.7 K/UL (ref 1.8–7.7)
SODIUM BLD-SCNC: 138 MMOL/L (ref 135–144)
WBC # BLD: 10.8 K/UL (ref 3.5–11)
WBC # BLD: ABNORMAL 10*3/UL

## 2018-08-22 PROCEDURE — 97116 GAIT TRAINING THERAPY: CPT

## 2018-08-22 PROCEDURE — 51702 INSERT TEMP BLADDER CATH: CPT

## 2018-08-22 PROCEDURE — 85025 COMPLETE CBC W/AUTO DIFF WBC: CPT

## 2018-08-22 PROCEDURE — 6370000000 HC RX 637 (ALT 250 FOR IP): Performed by: UROLOGY

## 2018-08-22 PROCEDURE — 6370000000 HC RX 637 (ALT 250 FOR IP): Performed by: INTERNAL MEDICINE

## 2018-08-22 PROCEDURE — 2500000003 HC RX 250 WO HCPCS: Performed by: INTERNAL MEDICINE

## 2018-08-22 PROCEDURE — 83735 ASSAY OF MAGNESIUM: CPT

## 2018-08-22 PROCEDURE — 2580000003 HC RX 258: Performed by: INTERNAL MEDICINE

## 2018-08-22 PROCEDURE — 84100 ASSAY OF PHOSPHORUS: CPT

## 2018-08-22 PROCEDURE — 80048 BASIC METABOLIC PNL TOTAL CA: CPT

## 2018-08-22 PROCEDURE — 36415 COLL VENOUS BLD VENIPUNCTURE: CPT

## 2018-08-22 RX ORDER — SODIUM CHLORIDE 9 MG/ML
INJECTION, SOLUTION INTRAVENOUS CONTINUOUS
Status: DISCONTINUED | OUTPATIENT
Start: 2018-08-22 | End: 2018-08-22 | Stop reason: HOSPADM

## 2018-08-22 RX ORDER — DILTIAZEM HYDROCHLORIDE 300 MG/1
300 CAPSULE, COATED, EXTENDED RELEASE ORAL DAILY
Qty: 30 CAPSULE | Refills: 3 | Status: SHIPPED | OUTPATIENT
Start: 2018-08-22 | End: 2019-08-07 | Stop reason: DRUGHIGH

## 2018-08-22 RX ORDER — CIPROFLOXACIN 250 MG/1
250 TABLET, FILM COATED ORAL
Qty: 10 TABLET | Refills: 0 | Status: SHIPPED | OUTPATIENT
Start: 2018-08-23 | End: 2018-08-30 | Stop reason: DRUGHIGH

## 2018-08-22 RX ADMIN — CIPROFLOXACIN 250 MG: 250 TABLET, FILM COATED ORAL at 09:06

## 2018-08-22 RX ADMIN — LABETALOL HYDROCHLORIDE 20 MG: 5 INJECTION INTRAVENOUS at 12:06

## 2018-08-22 RX ADMIN — SODIUM CHLORIDE: 4.5 INJECTION, SOLUTION INTRAVENOUS at 03:05

## 2018-08-22 RX ADMIN — DILTIAZEM HYDROCHLORIDE 300 MG: 180 CAPSULE, COATED, EXTENDED RELEASE ORAL at 09:06

## 2018-08-22 RX ADMIN — TAMSULOSIN HYDROCHLORIDE 0.4 MG: 0.4 CAPSULE ORAL at 09:06

## 2018-08-22 RX ADMIN — SODIUM CHLORIDE: 4.5 INJECTION, SOLUTION INTRAVENOUS at 09:10

## 2018-08-22 RX ADMIN — LABETALOL HYDROCHLORIDE 20 MG: 5 INJECTION INTRAVENOUS at 06:42

## 2018-08-22 ASSESSMENT — PAIN SCALES - GENERAL: PAINLEVEL_OUTOF10: 0

## 2018-08-22 NOTE — PROGRESS NOTES
Individual Concurrent Group Co-treatment   Time In 1033         Time Out 1103         Minutes [address] [place] [address] [place], Student Physical Therapist Assistant

## 2018-08-22 NOTE — PROGRESS NOTES
[facility]                                                                                  Urology Progress Note            Subjective: Follow-up urinary retention, elevated PSA    Patient Vitals for the past 24 hrs:   BP Temp Temp src Pulse Resp SpO2   08/21/18 2356 134/75 98.4 °F (36.9 °C) Oral 66 18 93 %   08/21/18 2122 (!) 146/80 98.2 °F (36.8 °C) Oral 71 18 92 %   08/21/18 1700 (!) 134/92 - - 76 23 96 %   08/21/18 1600 (!) 144/75 98 °F (36.7 °C) - 79 24 92 %   08/21/18 1500 - - - 91 22 92 %   08/21/18 1426 - - - - - 94 %   08/21/18 1400 (!) 142/66 - - 78 15 94 %   08/21/18 1300 132/73 - - 81 22 93 %   08/21/18 1200 (!) 147/72 98.2 °F (36.8 °C) - 79 23 93 %   08/21/18 1100 (!) 163/80 - - 82 12 94 %   08/21/18 1000 (!) 151/75 - - 83 13 95 %   08/21/18 0900 (!) 145/79 - - 83 23 95 %   08/21/18 0800 136/70 99.1 °F (37.3 °C) Infrared 80 17 94 %   08/21/18 0700 (!) 148/78 - - 78 14 93 %   08/21/18 0600 (!) 143/74 - - 79 17 93 %   08/21/18 0530 (!) 144/70 - - 75 12 -       Intake/Output Summary (Last 24 hours) at 08/22/18 0507  Last data filed at 08/21/18 1731   Gross per 24 hour   Intake             3810 ml   Output             2300 ml   Net             1510 ml       Recent Labs      08/20/18   0359  08/21/18   0438  08/22/18   0359   WBC  11.9*  12.0*  10.8   HGB  12.8*  11.6*  12.0*   HCT  39.6*  36.3*  37.6*   MCV  82.0  82.0  82.5   PLT  248  229  249     Recent Labs      08/19/18   1952  08/20/18   0359 08/21/18   0438  08/22/18   0359   NA  142  145*  140  138   K  3.5*  3.8  3.8  4.4   CL  103  104  103  104   CO2  26  31  24  25   PHOS  3.3  3.2   --   2.3*   BUN  41*  29*  13  13   CREATININE  3.14*  2.02*  1.18  1.29*       No results for input(s): COLORU, PHUR, LABCAST, WBCUA, RBCUA, MUCUS, TRICHOMONAS, YEAST, BACTERIA, CLARITYU, SPECGRAV, LEUKOCYTESUR, UROBILINOGEN, BILIRUBINUR, BLOODU in the last 72 hours.     Invalid input(s): NITRATE, GLUCOSEUKETONESUAMORPHOUS    Additional Lab/culture results:    Physical Exam: Patient is alert orient not in distress catheter in place urine clear    Interval Imaging Findings:    Impression:  Markedly elevated PSA  Urinary retention  Patient Active Problem List   Diagnosis    [facility]    Urinary retention    Elevated PSA    Hyperkalemia    High anion gap metabolic acidosis    Hypertension    Renal failure    Obstructive uropathy    Hydronephrosis    SVT (supraventricular tachycardia) (HCC)    Hypertrophy of prostate with urinary retention    Incomplete RBBB    Type 2 diabetes mellitus ([facility]       Plan:  We will follow as outpatient repeat PSA, and cystoscopy will be scheduled  Patient advised to call the office for follow-up  appointment    Noe Torre  5:07 AM 8/22/2018

## 2018-08-22 NOTE — PROGRESS NOTES
No new complaints. Hemodynamically stable. Lungs clear. Neg Ellyn's. Specialty notes appreciated. Cardiology has cleared him discharge. Renal Status stable. Has an appointment with Dr. Sania Hyde as an outpatient. Whitmore to remain until Urology follow-up. Also has an appointment with Dr. Kristie Shelton. Meds reconciled. Instructions given. Follow-up with his PCP in one week. Monitor glucose daily as well as BP. Pt prefers not to have any drug therapy for his DM at this time. Questions answered/expressed understanding.

## 2018-08-22 NOTE — PROGRESS NOTES
Patient discharged with new medication scripts and discharge paperwork. Whitmore remained due to orders per Dr. Maty Tierney. Patient supplied with leg bag for home use and instructed on safe practices and hygiene techniques for Whitmore care. Teach back method utilized. Patient without questions at time of discharge.

## 2018-08-22 NOTE — PROGRESS NOTES
Cardiovascular progress Note          Patient name: Toni Ross    YOB: 1955  Date of admission:  8/18/2018       Patient seen, examined. Previous clinical entries reviewed. All available laboratory, imaging and ancillary data reviewed. Subjective:      No events overnight. Cardizem increased to 300 mg daily. He denies any new chest pain, palpitations, shortness of breath, edema, orthopnea or PND. Systems review:  Constitutional: No fever/chills. HENT: No headache, neck pain or neck stiffness. No sore throat or dysphagia. Gastrointestinal: No abdominal pain, nausea or vomiting. Cardiac: As Above  Respiratory: As above  Neurologic: No new focal weakness or numbness  Psychiatric: Normal mood and mentation       Examination:   Vitals: BP (!) 174/96   Pulse 72   Temp 97.9 °F (36.6 °C) (Oral)   Resp 18   Ht 6' (1.829 m)   Wt 255 lb 11.7 oz (116 kg)   SpO2 92%   BMI 34.68 kg/m²     Intake/Output Summary (Last 24 hours) at 08/22/18 0855  Last data filed at 08/22/18 0618   Gross per 24 hour   Intake             2879 ml   Output             4300 ml   Net            -1421 ml       General appearance: Comfortable in no apparent distress. HEENT: No pallor. No icterus  Neck: Supple. Lungs:Generally decreased breath sounds  Heart: S1,S2  Abdomen: Soft  Extremities: No peripheral edema  Skin: No obvious rashes. Musculoskeletal: No obvious deformities. Neurologic: No focal deficits.      Labs/ Ancillary data:     CBC:   Recent Labs      08/20/18   0359  08/21/18   0438  08/22/18   0359   WBC  11.9*  12.0*  10.8   HGB  12.8*  11.6*  12.0*   PLT  248  229  249     BMP:    Recent Labs      08/20/18   0359  08/21/18   0438  08/22/18   0359   NA  145*  140  138   K  3.8  3.8  4.4   CL  104  103  104   CO2  31  24  25   BUN  29*  13  13   CREATININE  2.02*  1.18  1.29*   GLUCOSE  182*  138*  127*         Medications:   Scheduled Meds:   diltiazem  300 mg Oral Daily    tamsulosin  0.4 mg Oral Daily    ciprofloxacin  250 mg Oral Daily     Continuous Infusions:   sodium chloride 100 mL/hr at 08/22/18 0305    diltiazem (CARDIZEM) 125 mg in dextrose 5% 125 mL infusion 2.5 mg/hr (08/21/18 1300)       Assessment/ Plan :     Supraventricular tachycardia-currently in sinus rhythm. Acute renal failure - resolved. Hypertension - reasonably controlled. [id number] Debra Bennett for discharge from cardiac standpoint. Rx for Cardizem written. FU as outpatient with Dr. Faith Buckley (EP) next week and with our office in one month. Thank you very much for allowing us to participate in the care of this patient. Please call us with any questions.       Electronically signed by Memo Stoner MD on 8/22/2018 at 8:55 AM

## 2018-08-22 NOTE — CARE COORDINATION
Discharge planning    Patient transferred from CVICU and chart reviewed. Patient has no insurance nor PCP. List has been given per prior CM. Will need to monitor home meds for cost.     NEPHROLOGY following for TAMARA and no more HD needed and ordered evelin cath to be removed. CARDIOLOGY supraventricular tachycardia but currently in NSR. Increased cardizem     UROLOGY following for retention. Will keep cath in for 1 week. They will see patient in office.  Cysto with US bx to be scheduled

## 2018-08-29 ENCOUNTER — HOSPITAL ENCOUNTER (OUTPATIENT)
Age: 63
Discharge: HOME | End: 2018-08-29

## 2018-08-29 LAB
FREE THYROXINE INDEX: 10.3 UG/DL (ref 5–11)
PROSTATE SPECIFIC ANTIGEN: 28.03 UG/L
SEX HORMONE BINDING GLOBULIN: 26 NMOL/L (ref 11–80)
T4 TOTAL: 8.8 UG/DL (ref 4.5–12)
TESTOSTERONE FREE-NONMALE: 65.4 PG/ML (ref 47–244)
TESTOSTERONE TOTAL: 292 NG/DL (ref 220–1000)
THYROXINE UPTAKE: 34.5 % (ref 28–41)

## 2018-08-29 PROCEDURE — 84436 ASSAY OF TOTAL THYROXINE: CPT

## 2018-08-29 PROCEDURE — 84403 ASSAY OF TOTAL TESTOSTERONE: CPT

## 2018-08-29 PROCEDURE — 84270 ASSAY OF SEX HORMONE GLOBUL: CPT

## 2018-08-29 PROCEDURE — 36415 COLL VENOUS BLD VENIPUNCTURE: CPT

## 2018-08-29 PROCEDURE — 84153 ASSAY OF PSA TOTAL: CPT

## 2018-08-29 PROCEDURE — 84479 ASSAY OF THYROID (T3 OR T4): CPT

## 2018-08-30 ENCOUNTER — HOSPITAL ENCOUNTER (EMERGENCY)
Age: 63
Discharge: HOME | End: 2018-08-30

## 2018-08-30 VITALS
DIASTOLIC BLOOD PRESSURE: 84 MMHG | SYSTOLIC BLOOD PRESSURE: 131 MMHG | HEART RATE: 82 BPM | HEIGHT: 72 IN | OXYGEN SATURATION: 95 % | WEIGHT: 250 LBS | RESPIRATION RATE: 18 BRPM | TEMPERATURE: 97.5 F | BODY MASS INDEX: 33.86 KG/M2

## 2018-08-30 DIAGNOSIS — R33.9 URINARY RETENTION: Primary | ICD-10-CM

## 2018-08-30 LAB
-: ABNORMAL
ABSOLUTE EOS #: 0.3 K/UL (ref 0–0.4)
ABSOLUTE IMMATURE GRANULOCYTE: ABNORMAL K/UL (ref 0–0.3)
ABSOLUTE LYMPH #: 1 K/UL (ref 1–4.8)
ABSOLUTE MONO #: 0.8 K/UL (ref 0.2–0.8)
AMORPHOUS: ABNORMAL
ANION GAP SERPL CALCULATED.3IONS-SCNC: 12 MMOL/L (ref 9–17)
BACTERIA: ABNORMAL
BASOPHILS # BLD: 1 % (ref 0–2)
BASOPHILS ABSOLUTE: 0.1 K/UL (ref 0–0.2)
BILIRUBIN URINE: NEGATIVE
BUN BLDV-MCNC: 20 MG/DL (ref 8–23)
BUN/CREAT BLD: 13 (ref 9–20)
CALCIUM SERPL-MCNC: 9.1 MG/DL (ref 8.6–10.4)
CASTS UA: ABNORMAL /LPF
CHLORIDE BLD-SCNC: 107 MMOL/L (ref 98–107)
CO2: 24 MMOL/L (ref 20–31)
COLOR: YELLOW
COMMENT UA: ABNORMAL
CREAT SERPL-MCNC: 1.5 MG/DL (ref 0.7–1.2)
CRYSTALS, UA: ABNORMAL /HPF
DIFFERENTIAL TYPE: ABNORMAL
EOSINOPHILS RELATIVE PERCENT: 3 % (ref 1–4)
EPITHELIAL CELLS UA: ABNORMAL /HPF (ref 0–5)
GFR AFRICAN AMERICAN: 57 ML/MIN
GFR NON-AFRICAN AMERICAN: 47 ML/MIN
GFR SERPL CREATININE-BSD FRML MDRD: ABNORMAL ML/MIN/{1.73_M2}
GFR SERPL CREATININE-BSD FRML MDRD: ABNORMAL ML/MIN/{1.73_M2}
GLUCOSE BLD-MCNC: 171 MG/DL (ref 70–99)
GLUCOSE URINE: NEGATIVE
HCT VFR BLD CALC: 39 % (ref 41–53)
HEMOGLOBIN: 12.5 G/DL (ref 13.5–17.5)
IMMATURE GRANULOCYTES: ABNORMAL %
KETONES, URINE: NEGATIVE
LEUKOCYTE ESTERASE, URINE: NEGATIVE
LYMPHOCYTES # BLD: 11 % (ref 24–44)
MCH RBC QN AUTO: 26.1 PG (ref 26–34)
MCHC RBC AUTO-ENTMCNC: 32.1 G/DL (ref 31–37)
MCV RBC AUTO: 81.6 FL (ref 80–100)
MONOCYTES # BLD: 9 % (ref 1–7)
MUCUS: ABNORMAL
NITRITE, URINE: NEGATIVE
NRBC AUTOMATED: ABNORMAL PER 100 WBC
OTHER OBSERVATIONS UA: ABNORMAL
PDW BLD-RTO: 15.1 % (ref 11.5–14.5)
PH UA: 6 (ref 5–8)
PLATELET # BLD: 340 K/UL (ref 130–400)
PLATELET ESTIMATE: ABNORMAL
PMV BLD AUTO: 7.1 FL (ref 6–12)
POTASSIUM SERPL-SCNC: 4.5 MMOL/L (ref 3.7–5.3)
PROTEIN UA: NEGATIVE
RBC # BLD: 4.78 M/UL (ref 4.5–5.9)
RBC # BLD: ABNORMAL 10*6/UL
RBC UA: ABNORMAL /HPF (ref 0–2)
RENAL EPITHELIAL, UA: ABNORMAL /HPF
SEG NEUTROPHILS: 76 % (ref 36–66)
SEGMENTED NEUTROPHILS ABSOLUTE COUNT: 7.6 K/UL (ref 1.8–7.7)
SODIUM BLD-SCNC: 143 MMOL/L (ref 135–144)
SPECIFIC GRAVITY UA: 1.02 (ref 1–1.03)
TRICHOMONAS: ABNORMAL
TURBIDITY: CLEAR
URINE HGB: ABNORMAL
UROBILINOGEN, URINE: NORMAL
WBC # BLD: 9.9 K/UL (ref 3.5–11)
WBC # BLD: ABNORMAL 10*3/UL
WBC UA: ABNORMAL /HPF (ref 0–5)
YEAST: ABNORMAL

## 2018-08-30 PROCEDURE — 99283 EMERGENCY DEPT VISIT LOW MDM: CPT

## 2018-08-30 PROCEDURE — 36415 COLL VENOUS BLD VENIPUNCTURE: CPT

## 2018-08-30 PROCEDURE — 6370000000 HC RX 637 (ALT 250 FOR IP)

## 2018-08-30 PROCEDURE — 80048 BASIC METABOLIC PNL TOTAL CA: CPT

## 2018-08-30 PROCEDURE — 81001 URINALYSIS AUTO W/SCOPE: CPT

## 2018-08-30 PROCEDURE — 85025 COMPLETE CBC W/AUTO DIFF WBC: CPT

## 2018-08-30 RX ORDER — CIPROFLOXACIN 500 MG/1
500 TABLET, FILM COATED ORAL 2 TIMES DAILY
Qty: 20 TABLET | Refills: 0 | Status: SHIPPED | OUTPATIENT
Start: 2018-08-30 | End: 2018-08-30 | Stop reason: ALTCHOICE

## 2018-08-30 RX ORDER — CIPROFLOXACIN 250 MG/1
250 TABLET, FILM COATED ORAL DAILY
Qty: 20 TABLET | Refills: 0 | Status: SHIPPED | OUTPATIENT
Start: 2018-08-30 | End: 2018-09-09

## 2018-08-30 RX ADMIN — LIDOCAINE HYDROCHLORIDE 20 ML: 20 JELLY TOPICAL at 10:48

## 2018-08-30 ASSESSMENT — ENCOUNTER SYMPTOMS
NAUSEA: 1
SHORTNESS OF BREATH: 0
CHEST TIGHTNESS: 0
PHOTOPHOBIA: 0
ABDOMINAL DISTENTION: 1
ABDOMINAL PAIN: 1
COUGH: 0

## 2018-08-30 ASSESSMENT — PAIN SCALES - GENERAL: PAINLEVEL_OUTOF10: 2

## 2018-08-30 NOTE — ED NOTES
Total urine output 1700ml. Patient fitted with leg  Drainage bag. Toanace Sapphire Re-evaluated by doctor mnosalve.  Patient being prepared for discharge       Cooper Weathers RN  08/30/18 9081

## 2018-08-30 NOTE — ED PROVIDER NOTES
[address] [place] [address] ED  eMERGENCY dEPARTMENT eNCOUnter      Pt Name: Javier Gerardo  MRN: [id number]  [place] 1955  Date of evaluation: 8/30/2018  Provider: Alejandro Mohamud MD    CHIEF COMPLAINT       Chief Complaint   Patient presents with    Urinary Retention     needs ogden replaced         HISTORY OF PRESENT ILLNESS  (Location/Symptom, Timing/Onset, Context/Setting, Quality, Duration, Modifying Factors, Severity.)   Javier Gerardo is a 61 y.o. male who presents to the emergency department Presents with a chief complaint of needing his Ogden replaced. Patient had recently been admitted to the hospital for urinary retention and acute kidney injury with a elevated creatinine. He had the Ogden discontinued and Dr. Barragan Credit office and noted decrease urinary output but did not have a sensation that he had to urinate. At the last admission his PSA was elevated 113. Nursing Notes were reviewed. ALLERGIES     Lisinopril    CURRENT MEDICATIONS       Discharge Medication List as of 8/30/2018 11:35 AM      CONTINUE these medications which have NOT CHANGED    Details   diltiazem (CARDIZEM CD) 300 MG extended release capsule Take 1 capsule by mouth daily, Disp-30 capsule, R-3Print      Multiple Vitamins-Minerals (MULTIVITAMIN ADULT) TABS Take 1 tablet by mouth dailyHistorical Med             PAST MEDICAL HISTORY         Diagnosis Date    Elevated PSA     Hypertension     Kidney stone     Seasonal allergies        SURGICAL HISTORY           Procedure Laterality Date    CYSTOSCOPY           FAMILY HISTORY     History reviewed. No pertinent family history. No family status information on file. SOCIAL HISTORY      reports that he has never smoked. He has never used smokeless tobacco. He reports that he drinks alcohol. He reports that he does not use drugs. REVIEW OF SYSTEMS    (2-9 systems for level 4, 10 or more for level 5)     Review of Systems   Constitutional: Positive for activity change.

## 2018-08-30 NOTE — ED NOTES
advised by patient that he has a known hx of urinary retention and was admitted to [facility] around 2 weeks ago. Patient had a follow up appointment with doctor cartachita urologist on 8-29 and had ogden cath removed . Patient again experienced urinaary retention today and had difficulty voiding. Upon arrival patient is alert/oriented. respirations non labored. Patient being evaluated by doctor pontash and bladder scan shows volume over 999ml. Order obtained to have ogden inserted and lab work.  Valencia Brooks RN  08/30/18 3345

## 2018-08-30 NOTE — ED NOTES
Patient claims that  Discomfort has subsided. Total urine out put 1500 ml. . Patient waiting for re-evaluation by doctor bello.       Jitendra Mccormick RN  08/30/18 8965

## 2018-09-05 ENCOUNTER — ANESTHESIA EVENT (OUTPATIENT)
Dept: OPERATING ROOM | Age: 63
End: 2018-09-05

## 2018-09-07 ENCOUNTER — ANESTHESIA (OUTPATIENT)
Dept: OPERATING ROOM | Age: 63
End: 2018-09-07

## 2018-09-07 ENCOUNTER — HOSPITAL ENCOUNTER (OUTPATIENT)
Age: 63
Setting detail: OUTPATIENT SURGERY
Discharge: HOME | End: 2018-09-07
Attending: UROLOGY | Admitting: UROLOGY

## 2018-09-07 ENCOUNTER — HOSPITAL ENCOUNTER (OUTPATIENT)
Dept: ULTRASOUND IMAGING | Age: 63
Discharge: HOME | End: 2018-09-09

## 2018-09-07 VITALS — OXYGEN SATURATION: 95 % | SYSTOLIC BLOOD PRESSURE: 102 MMHG | DIASTOLIC BLOOD PRESSURE: 65 MMHG

## 2018-09-07 VITALS
HEART RATE: 65 BPM | WEIGHT: 249.9 LBS | TEMPERATURE: 97.2 F | HEIGHT: 72 IN | BODY MASS INDEX: 33.85 KG/M2 | SYSTOLIC BLOOD PRESSURE: 157 MMHG | OXYGEN SATURATION: 95 % | RESPIRATION RATE: 16 BRPM | DIASTOLIC BLOOD PRESSURE: 94 MMHG

## 2018-09-07 DIAGNOSIS — R97.20 ELEVATED PSA: ICD-10-CM

## 2018-09-07 PROBLEM — R33.9 URINE RETENTION: Status: ACTIVE | Noted: 2018-09-07

## 2018-09-07 LAB
-: NORMAL
AMORPHOUS: NORMAL
BACTERIA: NORMAL
BILIRUBIN URINE: NEGATIVE
CASTS UA: NORMAL /LPF
COLOR: ABNORMAL
COMMENT UA: ABNORMAL
CRYSTALS, UA: NORMAL /HPF
EKG ATRIAL RATE: 76 BPM
EKG P AXIS: 27 DEGREES
EKG P-R INTERVAL: 166 MS
EKG Q-T INTERVAL: 404 MS
EKG QRS DURATION: 110 MS
EKG QTC CALCULATION (BAZETT): 454 MS
EKG R AXIS: -39 DEGREES
EKG T AXIS: 14 DEGREES
EKG VENTRICULAR RATE: 76 BPM
EPITHELIAL CELLS UA: NORMAL /HPF (ref 0–5)
GLUCOSE URINE: NEGATIVE
KETONES, URINE: NEGATIVE
LEUKOCYTE ESTERASE, URINE: ABNORMAL
MUCUS: NORMAL
NITRITE, URINE: NEGATIVE
OTHER OBSERVATIONS UA: NORMAL
PH UA: 6 (ref 5–8)
PROTEIN UA: NEGATIVE
RBC UA: NORMAL /HPF (ref 0–2)
RENAL EPITHELIAL, UA: NORMAL /HPF
SPECIFIC GRAVITY UA: 1 (ref 1–1.03)
TRICHOMONAS: NORMAL
TURBIDITY: ABNORMAL
URINE HGB: ABNORMAL
UROBILINOGEN, URINE: NORMAL
WBC UA: NORMAL /HPF (ref 0–5)
YEAST: NORMAL

## 2018-09-07 PROCEDURE — 2580000003 HC RX 258: Performed by: ANESTHESIOLOGY

## 2018-09-07 PROCEDURE — 87086 URINE CULTURE/COLONY COUNT: CPT

## 2018-09-07 PROCEDURE — 6360000002 HC RX W HCPCS: Performed by: UROLOGY

## 2018-09-07 PROCEDURE — 3600000002 HC SURGERY LEVEL 2 BASE: Performed by: UROLOGY

## 2018-09-07 PROCEDURE — 7100000010 HC PHASE II RECOVERY - FIRST 15 MIN: Performed by: UROLOGY

## 2018-09-07 PROCEDURE — 55700 US BIOPSY PROSTATE NEEDLE/PUNCH: CPT

## 2018-09-07 PROCEDURE — 3700000001 HC ADD 15 MINUTES (ANESTHESIA): Performed by: UROLOGY

## 2018-09-07 PROCEDURE — 2709999900 HC NON-CHARGEABLE SUPPLY: Performed by: UROLOGY

## 2018-09-07 PROCEDURE — 93005 ELECTROCARDIOGRAM TRACING: CPT

## 2018-09-07 PROCEDURE — 3700000000 HC ANESTHESIA ATTENDED CARE: Performed by: UROLOGY

## 2018-09-07 PROCEDURE — 2580000003 HC RX 258: Performed by: UROLOGY

## 2018-09-07 PROCEDURE — 3600000012 HC SURGERY LEVEL 2 ADDTL 15MIN: Performed by: UROLOGY

## 2018-09-07 PROCEDURE — 2720000010 HC SURG SUPPLY STERILE: Performed by: UROLOGY

## 2018-09-07 PROCEDURE — 88344 IMHCHEM/IMCYTCHM EA MLT ANTB: CPT

## 2018-09-07 PROCEDURE — 6370000000 HC RX 637 (ALT 250 FOR IP): Performed by: UROLOGY

## 2018-09-07 PROCEDURE — 7100000011 HC PHASE II RECOVERY - ADDTL 15 MIN: Performed by: UROLOGY

## 2018-09-07 PROCEDURE — 81001 URINALYSIS AUTO W/SCOPE: CPT

## 2018-09-07 PROCEDURE — 88305 TISSUE EXAM BY PATHOLOGIST: CPT

## 2018-09-07 PROCEDURE — 6360000002 HC RX W HCPCS: Performed by: ANESTHESIOLOGY

## 2018-09-07 RX ORDER — SODIUM CHLORIDE 0.9 % (FLUSH) 0.9 %
10 SYRINGE (ML) INJECTION EVERY 12 HOURS SCHEDULED
Status: DISCONTINUED | OUTPATIENT
Start: 2018-09-07 | End: 2018-09-07 | Stop reason: HOSPADM

## 2018-09-07 RX ORDER — SODIUM CHLORIDE 0.9 % (FLUSH) 0.9 %
10 SYRINGE (ML) INJECTION PRN
Status: DISCONTINUED | OUTPATIENT
Start: 2018-09-07 | End: 2018-09-07 | Stop reason: HOSPADM

## 2018-09-07 RX ORDER — FENTANYL CITRATE 50 UG/ML
INJECTION, SOLUTION INTRAMUSCULAR; INTRAVENOUS PRN
Status: DISCONTINUED | OUTPATIENT
Start: 2018-09-07 | End: 2018-09-07 | Stop reason: SDUPTHER

## 2018-09-07 RX ORDER — SODIUM CHLORIDE 9 MG/ML
INJECTION, SOLUTION INTRAVENOUS CONTINUOUS
Status: DISCONTINUED | OUTPATIENT
Start: 2018-09-08 | End: 2018-09-07 | Stop reason: HOSPADM

## 2018-09-07 RX ORDER — PROPOFOL 10 MG/ML
INJECTION, EMULSION INTRAVENOUS PRN
Status: DISCONTINUED | OUTPATIENT
Start: 2018-09-07 | End: 2018-09-07 | Stop reason: SDUPTHER

## 2018-09-07 RX ORDER — SODIUM CHLORIDE, SODIUM LACTATE, POTASSIUM CHLORIDE, CALCIUM CHLORIDE 600; 310; 30; 20 MG/100ML; MG/100ML; MG/100ML; MG/100ML
INJECTION, SOLUTION INTRAVENOUS CONTINUOUS
Status: DISCONTINUED | OUTPATIENT
Start: 2018-09-08 | End: 2018-09-07 | Stop reason: HOSPADM

## 2018-09-07 RX ORDER — LIDOCAINE HYDROCHLORIDE 10 MG/ML
1 INJECTION, SOLUTION EPIDURAL; INFILTRATION; INTRACAUDAL; PERINEURAL
Status: DISCONTINUED | OUTPATIENT
Start: 2018-09-07 | End: 2018-09-07 | Stop reason: HOSPADM

## 2018-09-07 RX ORDER — MIDAZOLAM HYDROCHLORIDE 1 MG/ML
INJECTION INTRAMUSCULAR; INTRAVENOUS PRN
Status: DISCONTINUED | OUTPATIENT
Start: 2018-09-07 | End: 2018-09-07 | Stop reason: SDUPTHER

## 2018-09-07 RX ADMIN — PROPOFOL 40 MG: 10 INJECTION, EMULSION INTRAVENOUS at 11:19

## 2018-09-07 RX ADMIN — PROPOFOL 30 MG: 10 INJECTION, EMULSION INTRAVENOUS at 11:35

## 2018-09-07 RX ADMIN — PROPOFOL 50 MG: 10 INJECTION, EMULSION INTRAVENOUS at 11:30

## 2018-09-07 RX ADMIN — GENTAMICIN SULFATE: 40 INJECTION, SOLUTION INTRAMUSCULAR; INTRAVENOUS at 11:30

## 2018-09-07 RX ADMIN — PROPOFOL 20 MG: 10 INJECTION, EMULSION INTRAVENOUS at 11:27

## 2018-09-07 RX ADMIN — PROPOFOL 50 MG: 10 INJECTION, EMULSION INTRAVENOUS at 11:40

## 2018-09-07 RX ADMIN — PROPOFOL 20 MG: 10 INJECTION, EMULSION INTRAVENOUS at 11:18

## 2018-09-07 RX ADMIN — MIDAZOLAM 2 MG: 1 INJECTION INTRAMUSCULAR; INTRAVENOUS at 11:12

## 2018-09-07 RX ADMIN — FENTANYL CITRATE 100 MCG: 50 INJECTION, SOLUTION INTRAMUSCULAR; INTRAVENOUS at 11:12

## 2018-09-07 RX ADMIN — GENTAMICIN SULFATE 80 MG: 40 INJECTION, SOLUTION INTRAMUSCULAR; INTRAVENOUS at 11:15

## 2018-09-07 RX ADMIN — PROPOFOL 40 MG: 10 INJECTION, EMULSION INTRAVENOUS at 11:33

## 2018-09-07 RX ADMIN — PROPOFOL 20 MG: 10 INJECTION, EMULSION INTRAVENOUS at 11:21

## 2018-09-07 RX ADMIN — PROPOFOL 20 MG: 10 INJECTION, EMULSION INTRAVENOUS at 11:45

## 2018-09-07 RX ADMIN — SODIUM CHLORIDE, POTASSIUM CHLORIDE, SODIUM LACTATE AND CALCIUM CHLORIDE: 600; 310; 30; 20 INJECTION, SOLUTION INTRAVENOUS at 11:10

## 2018-09-07 RX ADMIN — PROPOFOL 20 MG: 10 INJECTION, EMULSION INTRAVENOUS at 11:15

## 2018-09-07 RX ADMIN — PROPOFOL 20 MG: 10 INJECTION, EMULSION INTRAVENOUS at 11:38

## 2018-09-07 RX ADMIN — PROPOFOL 20 MG: 10 INJECTION, EMULSION INTRAVENOUS at 11:42

## 2018-09-07 RX ADMIN — PROPOFOL 50 MG: 10 INJECTION, EMULSION INTRAVENOUS at 11:12

## 2018-09-07 RX ADMIN — PROPOFOL 20 MG: 10 INJECTION, EMULSION INTRAVENOUS at 11:24

## 2018-09-07 RX ADMIN — SODIUM CHLORIDE, POTASSIUM CHLORIDE, SODIUM LACTATE AND CALCIUM CHLORIDE: 600; 310; 30; 20 INJECTION, SOLUTION INTRAVENOUS at 09:02

## 2018-09-07 ASSESSMENT — PULMONARY FUNCTION TESTS
PIF_VALUE: 0
PIF_VALUE: 1
PIF_VALUE: 1
PIF_VALUE: 0
PIF_VALUE: 1
PIF_VALUE: 0
PIF_VALUE: 1
PIF_VALUE: 0
PIF_VALUE: 1
PIF_VALUE: 0
PIF_VALUE: 0
PIF_VALUE: 1
PIF_VALUE: 0
PIF_VALUE: 1
PIF_VALUE: 0
PIF_VALUE: 0
PIF_VALUE: 1
PIF_VALUE: 0
PIF_VALUE: 1
PIF_VALUE: 0
PIF_VALUE: 1
PIF_VALUE: 1
PIF_VALUE: 0
PIF_VALUE: 1

## 2018-09-07 ASSESSMENT — PAIN SCALES - GENERAL: PAINLEVEL_OUTOF10: 0

## 2018-09-07 ASSESSMENT — PAIN DESCRIPTION - LOCATION: LOCATION: PENIS

## 2018-09-07 ASSESSMENT — PAIN - FUNCTIONAL ASSESSMENT: PAIN_FUNCTIONAL_ASSESSMENT: 0-10

## 2018-09-07 ASSESSMENT — PAIN DESCRIPTION - DESCRIPTORS: DESCRIPTORS: DISCOMFORT

## 2018-09-07 NOTE — INTERVAL H&P NOTE
nondistended, no masses or organomegaly.        LUCAS Coppola CNP  Electronically signed 9/7/2018 at 8:51 AM

## 2018-09-07 NOTE — OP NOTE
[address] [facility]    Operative Note    Lydia Santos  YOB: 1955  [id number]      Pre-operative Diagnosis: Urinary retention, rising PSA    Post-operative Diagnosis: Same    Procedure: Cystoscopy, ultrasound guided prostate biopsy    Anesthesia: MAC    Surgeons/Assistants: Dr Nuzhat Cooley    Estimated Blood Loss: less than 50     Complications: None    Specimens: Was Obtained: Urine, prostate tissue    Indications:61year-old male with urinary retention and indwelling [place] rising PSA scheduled for cystoscopy and ultrasound-guided biopsies    Operative Findings: Patient was brought to the operating room and positioned in supine proper patient identification and procedure identification completed. After prepping and draping we entered the bladder with the flexible cystoscope, lateral lobe hypertrophy of the prostate with visual occlusion of the bladder outlet was documented. The nature of the bladder was examined, there were no tumors or ulcers or stones identified, the ureteral orifices effluxing clear urine, bladder trabeculations identified. At the completion of the cystoscopy urine specimen was obtained for culture and the cystoscope was removed and the patient was positioned in left lateral position    Prostate biopsy:    Transrectal probe was introduced in the rectum scanning of the prostate was carried out, prostate calcifications as well as hypoechoic and hyperechoic areas and prostate calculi identified. Prostate volume measured about 150 mL. Ultrasound-guided biopsy was then carried out, a total of 12 specimens were obtained from the apex, mid gland, and base of the prostate, sent to pathology separately    At the completion the transrectal probe was removed, the patient was returned to recovery room in stable condition    Recommendations:  Follow-up visit in the office to discuss results    Electronically signed by Ghislaine Ling MD on 9/7/2018 at 11:07 AM

## 2018-09-08 LAB
CULTURE: NO GROWTH
Lab: NORMAL
SPECIMEN DESCRIPTION: NORMAL
STATUS: NORMAL

## 2018-09-11 LAB — SURGICAL PATHOLOGY REPORT: NORMAL

## 2018-10-19 ENCOUNTER — HOSPITAL ENCOUNTER (OUTPATIENT)
Age: 63
Discharge: HOME | End: 2018-10-19

## 2018-10-19 LAB — PROSTATE SPECIFIC ANTIGEN: 9.11 UG/L

## 2018-10-19 PROCEDURE — G0103 PSA SCREENING: HCPCS

## 2018-10-19 PROCEDURE — 36415 COLL VENOUS BLD VENIPUNCTURE: CPT

## 2018-10-24 ENCOUNTER — APPOINTMENT (OUTPATIENT)
Dept: GENERAL RADIOLOGY | Age: 63
DRG: 698 | End: 2018-10-24

## 2018-10-24 ENCOUNTER — HOSPITAL ENCOUNTER (INPATIENT)
Age: 63
LOS: 5 days | Discharge: HOME HEALTH | DRG: 698 | End: 2018-10-29
Attending: EMERGENCY MEDICINE | Admitting: INTERNAL MEDICINE

## 2018-10-24 DIAGNOSIS — A41.9 SEPTICEMIA (HCC): Primary | ICD-10-CM

## 2018-10-24 DIAGNOSIS — N10 ACUTE PYELONEPHRITIS: ICD-10-CM

## 2018-10-24 PROBLEM — N18.6 ESRD (END STAGE RENAL DISEASE) (HCC): Status: RESOLVED | Noted: 2018-08-18 | Resolved: 2018-10-24

## 2018-10-24 PROBLEM — R65.21 SEPTIC SHOCK (HCC): Status: ACTIVE | Noted: 2018-10-24

## 2018-10-24 PROBLEM — R65.20 SEVERE SEPSIS (HCC): Status: ACTIVE | Noted: 2018-10-24

## 2018-10-24 PROBLEM — E87.29 HIGH ANION GAP METABOLIC ACIDOSIS: Status: RESOLVED | Noted: 2018-08-18 | Resolved: 2018-10-24

## 2018-10-24 PROBLEM — N18.30 CKD (CHRONIC KIDNEY DISEASE) STAGE 3, GFR 30-59 ML/MIN (HCC): Status: ACTIVE | Noted: 2018-10-24

## 2018-10-24 LAB
-: ABNORMAL
ABSOLUTE EOS #: 0 K/UL (ref 0–0.4)
ABSOLUTE IMMATURE GRANULOCYTE: ABNORMAL K/UL (ref 0–0.3)
ABSOLUTE LYMPH #: 0.88 K/UL (ref 1–4.8)
ABSOLUTE MONO #: 0.35 K/UL (ref 0.2–0.8)
AMORPHOUS: ABNORMAL
ANION GAP SERPL CALCULATED.3IONS-SCNC: 24 MMOL/L (ref 9–17)
BACTERIA: ABNORMAL
BASOPHILS # BLD: 0 %
BASOPHILS ABSOLUTE: 0 K/UL (ref 0–0.2)
BILIRUBIN URINE: NEGATIVE
BUN BLDV-MCNC: 19 MG/DL (ref 8–23)
BUN/CREAT BLD: 11 (ref 9–20)
CALCIUM SERPL-MCNC: 9.2 MG/DL (ref 8.6–10.4)
CASTS UA: ABNORMAL /LPF
CHLORIDE BLD-SCNC: 99 MMOL/L (ref 98–107)
CO2: 16 MMOL/L (ref 20–31)
COLOR: YELLOW
COMMENT UA: ABNORMAL
CREAT SERPL-MCNC: 1.67 MG/DL (ref 0.7–1.2)
CRYSTALS, UA: ABNORMAL /HPF
DIFFERENTIAL TYPE: ABNORMAL
EKG ATRIAL RATE: 124 BPM
EKG P AXIS: 20 DEGREES
EKG P-R INTERVAL: 122 MS
EKG Q-T INTERVAL: 332 MS
EKG QRS DURATION: 100 MS
EKG QTC CALCULATION (BAZETT): 476 MS
EKG R AXIS: -50 DEGREES
EKG T AXIS: 30 DEGREES
EKG VENTRICULAR RATE: 124 BPM
EOSINOPHILS RELATIVE PERCENT: 0 % (ref 1–4)
EPITHELIAL CELLS UA: ABNORMAL /HPF (ref 0–5)
GFR AFRICAN AMERICAN: 51 ML/MIN
GFR NON-AFRICAN AMERICAN: 42 ML/MIN
GFR SERPL CREATININE-BSD FRML MDRD: ABNORMAL ML/MIN/{1.73_M2}
GFR SERPL CREATININE-BSD FRML MDRD: ABNORMAL ML/MIN/{1.73_M2}
GLUCOSE BLD-MCNC: 216 MG/DL (ref 70–99)
GLUCOSE URINE: NEGATIVE
HCT VFR BLD CALC: 42.5 % (ref 41–53)
HEMOGLOBIN: 14.1 G/DL (ref 13.5–17.5)
IMMATURE GRANULOCYTES: ABNORMAL %
KETONES, URINE: NEGATIVE
LACTIC ACID, SEPSIS WHOLE BLOOD: ABNORMAL MMOL/L (ref 0.5–1.9)
LACTIC ACID, SEPSIS: 2.2 MMOL/L (ref 0.5–1.9)
LACTIC ACID, SEPSIS: 3 MMOL/L (ref 0.5–1.9)
LACTIC ACID, SEPSIS: 3.3 MMOL/L (ref 0.5–1.9)
LACTIC ACID, SEPSIS: 8.6 MMOL/L (ref 0.5–1.9)
LEUKOCYTE ESTERASE, URINE: ABNORMAL
LYMPHOCYTES # BLD: 5 % (ref 24–44)
MCH RBC QN AUTO: 26.7 PG (ref 26–34)
MCHC RBC AUTO-ENTMCNC: 33.2 G/DL (ref 31–37)
MCV RBC AUTO: 80.4 FL (ref 80–100)
MONOCYTES # BLD: 2 % (ref 1–7)
MUCUS: ABNORMAL
NITRITE, URINE: POSITIVE
NRBC AUTOMATED: ABNORMAL PER 100 WBC
OTHER OBSERVATIONS UA: ABNORMAL
PDW BLD-RTO: 14.7 % (ref 11.5–14.5)
PH UA: 6 (ref 5–8)
PLATELET # BLD: 271 K/UL (ref 130–400)
PLATELET ESTIMATE: ABNORMAL
PMV BLD AUTO: 7.4 FL (ref 6–12)
POTASSIUM SERPL-SCNC: 3.8 MMOL/L (ref 3.7–5.3)
PROTEIN UA: ABNORMAL
RBC # BLD: 5.29 M/UL (ref 4.5–5.9)
RBC # BLD: ABNORMAL 10*6/UL
RBC UA: ABNORMAL /HPF (ref 0–2)
RENAL EPITHELIAL, UA: ABNORMAL /HPF
SEG NEUTROPHILS: 93 % (ref 36–66)
SEGMENTED NEUTROPHILS ABSOLUTE COUNT: 16.27 K/UL (ref 1.8–7.7)
SODIUM BLD-SCNC: 139 MMOL/L (ref 135–144)
SPECIFIC GRAVITY UA: >1.03 (ref 1–1.03)
TRICHOMONAS: ABNORMAL
TURBIDITY: ABNORMAL
URINE HGB: ABNORMAL
UROBILINOGEN, URINE: NORMAL
WBC # BLD: 17.5 K/UL (ref 3.5–11)
WBC # BLD: ABNORMAL 10*3/UL
WBC UA: ABNORMAL /HPF (ref 0–5)
YEAST: ABNORMAL

## 2018-10-24 PROCEDURE — 80048 BASIC METABOLIC PNL TOTAL CA: CPT

## 2018-10-24 PROCEDURE — 81001 URINALYSIS AUTO W/SCOPE: CPT

## 2018-10-24 PROCEDURE — 94761 N-INVAS EAR/PLS OXIMETRY MLT: CPT

## 2018-10-24 PROCEDURE — 87186 SC STD MICRODIL/AGAR DIL: CPT

## 2018-10-24 PROCEDURE — 6360000002 HC RX W HCPCS: Performed by: INTERNAL MEDICINE

## 2018-10-24 PROCEDURE — 99285 EMERGENCY DEPT VISIT HI MDM: CPT

## 2018-10-24 PROCEDURE — 87205 SMEAR GRAM STAIN: CPT

## 2018-10-24 PROCEDURE — 85025 COMPLETE CBC W/AUTO DIFF WBC: CPT

## 2018-10-24 PROCEDURE — 6370000000 HC RX 637 (ALT 250 FOR IP): Performed by: NURSE PRACTITIONER

## 2018-10-24 PROCEDURE — 2000000000 HC ICU R&B

## 2018-10-24 PROCEDURE — 71045 X-RAY EXAM CHEST 1 VIEW: CPT

## 2018-10-24 PROCEDURE — 96365 THER/PROPH/DIAG IV INF INIT: CPT

## 2018-10-24 PROCEDURE — 87086 URINE CULTURE/COLONY COUNT: CPT

## 2018-10-24 PROCEDURE — 99223 1ST HOSP IP/OBS HIGH 75: CPT | Performed by: INTERNAL MEDICINE

## 2018-10-24 PROCEDURE — 2500000003 HC RX 250 WO HCPCS: Performed by: INTERNAL MEDICINE

## 2018-10-24 PROCEDURE — 87088 URINE BACTERIA CULTURE: CPT

## 2018-10-24 PROCEDURE — 2580000003 HC RX 258: Performed by: INTERNAL MEDICINE

## 2018-10-24 PROCEDURE — 93005 ELECTROCARDIOGRAM TRACING: CPT

## 2018-10-24 PROCEDURE — 2500000003 HC RX 250 WO HCPCS: Performed by: NURSE PRACTITIONER

## 2018-10-24 PROCEDURE — 83605 ASSAY OF LACTIC ACID: CPT

## 2018-10-24 PROCEDURE — 87040 BLOOD CULTURE FOR BACTERIA: CPT

## 2018-10-24 PROCEDURE — 87149 DNA/RNA DIRECT PROBE: CPT

## 2018-10-24 PROCEDURE — 6370000000 HC RX 637 (ALT 250 FOR IP): Performed by: INTERNAL MEDICINE

## 2018-10-24 PROCEDURE — 6360000002 HC RX W HCPCS: Performed by: NURSE PRACTITIONER

## 2018-10-24 PROCEDURE — 2580000003 HC RX 258: Performed by: NURSE PRACTITIONER

## 2018-10-24 PROCEDURE — 36415 COLL VENOUS BLD VENIPUNCTURE: CPT

## 2018-10-24 PROCEDURE — 2700000000 HC OXYGEN THERAPY PER DAY

## 2018-10-24 PROCEDURE — 96375 TX/PRO/DX INJ NEW DRUG ADDON: CPT

## 2018-10-24 RX ORDER — TAMSULOSIN HYDROCHLORIDE 0.4 MG/1
0.4 CAPSULE ORAL DAILY
COMMUNITY
End: 2018-12-18

## 2018-10-24 RX ORDER — M-VIT,TX,IRON,MINS/CALC/FOLIC 27MG-0.4MG
1 TABLET ORAL DAILY
Status: DISCONTINUED | OUTPATIENT
Start: 2018-10-24 | End: 2018-10-29 | Stop reason: HOSPADM

## 2018-10-24 RX ORDER — HYDRALAZINE HYDROCHLORIDE 20 MG/ML
10 INJECTION INTRAMUSCULAR; INTRAVENOUS EVERY 6 HOURS PRN
Status: DISCONTINUED | OUTPATIENT
Start: 2018-10-24 | End: 2018-10-29 | Stop reason: HOSPADM

## 2018-10-24 RX ORDER — ACETAMINOPHEN 650 MG/1
650 SUPPOSITORY RECTAL EVERY 4 HOURS PRN
Status: DISCONTINUED | OUTPATIENT
Start: 2018-10-24 | End: 2018-10-27

## 2018-10-24 RX ORDER — SODIUM CHLORIDE 0.9 % (FLUSH) 0.9 %
10 SYRINGE (ML) INJECTION EVERY 12 HOURS SCHEDULED
Status: DISCONTINUED | OUTPATIENT
Start: 2018-10-24 | End: 2018-10-29 | Stop reason: HOSPADM

## 2018-10-24 RX ORDER — SODIUM CHLORIDE 0.9 % (FLUSH) 0.9 %
10 SYRINGE (ML) INJECTION EVERY 12 HOURS SCHEDULED
Status: DISCONTINUED | OUTPATIENT
Start: 2018-10-24 | End: 2018-10-24

## 2018-10-24 RX ORDER — SODIUM CHLORIDE 9 MG/ML
INJECTION, SOLUTION INTRAVENOUS CONTINUOUS
Status: DISCONTINUED | OUTPATIENT
Start: 2018-10-24 | End: 2018-10-26

## 2018-10-24 RX ORDER — ACETAMINOPHEN 325 MG/1
650 TABLET ORAL EVERY 4 HOURS PRN
Status: DISCONTINUED | OUTPATIENT
Start: 2018-10-24 | End: 2018-10-27

## 2018-10-24 RX ORDER — ACETAMINOPHEN 500 MG
1000 TABLET ORAL ONCE
Status: COMPLETED | OUTPATIENT
Start: 2018-10-24 | End: 2018-10-24

## 2018-10-24 RX ORDER — SODIUM CHLORIDE 0.9 % (FLUSH) 0.9 %
10 SYRINGE (ML) INJECTION PRN
Status: DISCONTINUED | OUTPATIENT
Start: 2018-10-24 | End: 2018-10-29 | Stop reason: HOSPADM

## 2018-10-24 RX ORDER — 0.9 % SODIUM CHLORIDE 0.9 %
30 INTRAVENOUS SOLUTION INTRAVENOUS ONCE
Status: COMPLETED | OUTPATIENT
Start: 2018-10-24 | End: 2018-10-24

## 2018-10-24 RX ORDER — ACETAMINOPHEN 650 MG/1
650 SUPPOSITORY RECTAL ONCE
Status: DISCONTINUED | OUTPATIENT
Start: 2018-10-24 | End: 2018-10-29 | Stop reason: HOSPADM

## 2018-10-24 RX ORDER — SODIUM CHLORIDE 0.9 % (FLUSH) 0.9 %
10 SYRINGE (ML) INJECTION PRN
Status: DISCONTINUED | OUTPATIENT
Start: 2018-10-24 | End: 2018-10-24 | Stop reason: SDUPTHER

## 2018-10-24 RX ORDER — ONDANSETRON 2 MG/ML
8 INJECTION INTRAMUSCULAR; INTRAVENOUS ONCE
Status: COMPLETED | OUTPATIENT
Start: 2018-10-24 | End: 2018-10-24

## 2018-10-24 RX ORDER — METOPROLOL TARTRATE 5 MG/5ML
5 INJECTION INTRAVENOUS EVERY 4 HOURS PRN
Status: DISCONTINUED | OUTPATIENT
Start: 2018-10-24 | End: 2018-10-29 | Stop reason: HOSPADM

## 2018-10-24 RX ORDER — TAMSULOSIN HYDROCHLORIDE 0.4 MG/1
0.4 CAPSULE ORAL DAILY
Status: DISCONTINUED | OUTPATIENT
Start: 2018-10-24 | End: 2018-10-29 | Stop reason: HOSPADM

## 2018-10-24 RX ORDER — LORAZEPAM 2 MG/ML
1 INJECTION INTRAMUSCULAR ONCE
Status: DISCONTINUED | OUTPATIENT
Start: 2018-10-24 | End: 2018-10-29 | Stop reason: HOSPADM

## 2018-10-24 RX ORDER — DUTASTERIDE 0.5 MG/1
0.5 CAPSULE, LIQUID FILLED ORAL DAILY
COMMUNITY
End: 2018-12-18

## 2018-10-24 RX ORDER — CIPROFLOXACIN 250 MG/1
250 TABLET, FILM COATED ORAL DAILY
Status: ON HOLD | COMMUNITY
End: 2018-10-29 | Stop reason: HOSPADM

## 2018-10-24 RX ORDER — FINASTERIDE 5 MG/1
5 TABLET, FILM COATED ORAL DAILY
Status: DISCONTINUED | OUTPATIENT
Start: 2018-10-24 | End: 2018-10-29 | Stop reason: HOSPADM

## 2018-10-24 RX ADMIN — ONDANSETRON 8 MG: 2 INJECTION INTRAMUSCULAR; INTRAVENOUS at 08:23

## 2018-10-24 RX ADMIN — ACETAMINOPHEN 1000 MG: 500 TABLET ORAL at 08:23

## 2018-10-24 RX ADMIN — ACETAMINOPHEN 650 MG: 325 TABLET ORAL at 21:38

## 2018-10-24 RX ADMIN — TAZOBACTAM SODIUM AND PIPERACILLIN SODIUM 3.38 G: 375; 3 INJECTION, SOLUTION INTRAVENOUS at 08:44

## 2018-10-24 RX ADMIN — SODIUM CHLORIDE: 9 INJECTION, SOLUTION INTRAVENOUS at 13:00

## 2018-10-24 RX ADMIN — METOPROLOL TARTRATE 5 MG: 5 INJECTION, SOLUTION INTRAVENOUS at 21:43

## 2018-10-24 RX ADMIN — FINASTERIDE 5 MG: 5 TABLET, FILM COATED ORAL at 14:32

## 2018-10-24 RX ADMIN — TAZOBACTAM SODIUM AND PIPERACILLIN SODIUM 3.38 G: 375; 3 INJECTION, SOLUTION INTRAVENOUS at 17:03

## 2018-10-24 RX ADMIN — MULTIPLE VITAMINS W/ MINERALS TAB 1 TABLET: TAB at 17:03

## 2018-10-24 RX ADMIN — ENOXAPARIN SODIUM 30 MG: 30 INJECTION SUBCUTANEOUS at 21:23

## 2018-10-24 RX ADMIN — TAMSULOSIN HYDROCHLORIDE 0.4 MG: 0.4 CAPSULE ORAL at 14:32

## 2018-10-24 RX ADMIN — SODIUM CHLORIDE 3267 ML: 9 INJECTION, SOLUTION INTRAVENOUS at 08:23

## 2018-10-24 RX ADMIN — ENOXAPARIN SODIUM 30 MG: 30 INJECTION SUBCUTANEOUS at 14:32

## 2018-10-24 ASSESSMENT — ENCOUNTER SYMPTOMS
NAUSEA: 1
COUGH: 0
SORE THROAT: 0
CHEST TIGHTNESS: 0
RHINORRHEA: 0
ABDOMINAL PAIN: 0
VOMITING: 0
DIARRHEA: 0
SHORTNESS OF BREATH: 0

## 2018-10-24 ASSESSMENT — PAIN SCALES - GENERAL
PAINLEVEL_OUTOF10: 0

## 2018-10-24 NOTE — PROGRESS NOTES
Transitions of Care Pharmacy Service   Medication Review    The patient's list of current home medications has been reviewed. Source(s) of information: [employer]/Patient    Based on information provided by the above source(s), no changes to the patient's home medication list were necessary. Please feel free to call me with any questions about this encounter. Thank you. This note will be reviewed and co-signed by the Transitions of Care Pharmacist.    Raghu Garnett PharmD student  Transitions of [facility] Service  Phone:  [phone]  Fax: [phone]      Electronically signed by Raghu Garnett on 10/24/2018 at 11:30 AM       Prior to Admission medications    Medication Sig Start Date End Date Taking?  Authorizing Provider   ciprofloxacin (CIPRO) 250 MG tablet Take 250 mg by mouth daily   Yes Historical Provider, MD   dutasteride (AVODART) 0.5 MG capsule Take 0.5 mg by mouth daily   Yes Historical Provider, MD   tamsulosin (FLOMAX) 0.4 MG capsule Take 0.4 mg by mouth daily   Yes Historical Provider, MD   diltiazem (CARDIZEM CD) 300 MG extended release capsule Take 1 capsule by mouth daily 8/22/18  Yes Lianne Galeana MD   Multiple Vitamins-Minerals (MULTIVITAMIN ADULT) TABS Take 1 tablet by mouth daily   Yes Historical Provider, MD

## 2018-10-24 NOTE — H&P
Max:103 °F (39.4 °C)    No results for input(s): POCGLU in the last 72 hours. Intake/Output Summary (Last 24 hours) at 10/24/18 1442  Last data filed at 10/24/18 1030   Gross per 24 hour   Intake                0 ml   Output               50 ml   Net              -50 ml       General Appearance:  alert, well appearing, and in no acute distress  Mental status: oriented to person, place, and time with normal affect  Head:  normocephalic, atraumatic. Eye: no icterus, redness, pupils equal and reactive, extraocular eye movements intact, conjunctiva clear  Ear: normal external ear, no discharge, hearing intact  Nose:  no drainage noted  Mouth: mucous membranes moist  Neck: supple, no carotid bruits, thyroid not palpable  Lungs: Bilateral equal air entry, clear to auscultation, no wheezing, rales or rhonchi, normal effort  Cardiovascular: normal rate, regular rhythm, no murmur, gallop, rub.   Abdomen: Soft, nontender, nondistended, normal bowel sounds, no hepatomegaly or splenomegaly; obese  Neurologic: There are no new focal motor or sensory deficits, normal muscle tone and bulk, no abnormal sensation, normal speech, cranial nerves II through XII grossly intact  Skin: No gross lesions, rashes, bruising or bleeding on exposed skin area  Extremities:  peripheral pulses palpable, no pedal edema or calf pain with palpation  Psych: normal affect     Investigations:      Laboratory Testing:  Recent Results (from the past 24 hour(s))   EKG 12 Lead    Collection Time: 10/24/18  8:13 AM   Result Value Ref Range    Ventricular Rate 124 BPM    Atrial Rate 124 BPM    P-R Interval 122 ms    QRS Duration 100 ms    Q-T Interval 332 ms    QTc Calculation (Bazett) 476 ms    P Axis 20 degrees    R Axis -50 degrees    T Axis 30 degrees   Lactate, Sepsis    Collection Time: 10/24/18  8:20 AM   Result Value Ref Range    Lactic Acid, Sepsis 8.6 (H) 0.5 - 1.9 mmol/L    Lactic Acid, Sepsis, Whole Blood NOT REPORTED 0.5 - 1.9 mmol/L   CBC (A) NEG    Leukocyte Esterase, Urine SMALL (A) NEG    Urinalysis Comments NOT REPORTED    Microscopic Urinalysis    Collection Time: 10/24/18  8:30 AM   Result Value Ref Range    -          WBC, UA TOO NUMEROUS TO COUNT 0 - 5 /HPF    RBC, UA 20 TO 50 0 - 2 /HPF    Casts UA NOT REPORTED /LPF    Crystals UA NOT REPORTED NONE /HPF    Epithelial Cells UA 2 TO 5 0 - 5 /HPF    Renal Epithelial, Urine NOT REPORTED 0 /HPF    Bacteria, UA MANY (A) NONE    Mucus, UA NOT REPORTED NONE    Trichomonas, UA NOT REPORTED NONE    Amorphous, UA NOT REPORTED NONE    Other Observations UA NOT REPORTED NREQ    Yeast, UA NOT REPORTED NONE   Lactate, Sepsis    Collection Time: 10/24/18 10:20 AM   Result Value Ref Range    Lactic Acid, Sepsis 3.0 (H) 0.5 - 1.9 mmol/L    Lactic Acid, Sepsis, Whole Blood NOT REPORTED 0.5 - 1.9 mmol/L   Lactate, Sepsis    Collection Time: 10/24/18  1:32 PM   Result Value Ref Range    Lactic Acid, Sepsis 2.2 (H) 0.5 - 1.9 mmol/L    Lactic Acid, Sepsis, Whole Blood NOT REPORTED 0.5 - 1.9 mmol/L       Imaging/Diagnostics:    Cxr:     Impression   Borderline cardiomegaly.  Low lung volumes with minimal bibasilar opacity   likely related atelectasis with mild infiltrate not excluded. Ekg:  Sinus tach    Assessment :      Primary Problem  Severe sepsis [facility])    Active Hospital Problems    Diagnosis Date Noted    Severe sepsis ([facility] [address] [A41.9, R65.20] 10/24/2018    CKD (chronic kidney disease) stage 3, GFR 30-59 ml/min (formerly [facility]) [N18.3] 10/24/2018    Obstructive uropathy [N13.9] 08/19/2018    Hypertension [I10] 08/18/2018    Urinary retention [R33.9] 08/18/2018       Plan:     Patient status Admit as inpatient in the  Medical ICU    1. A repeat sepsis focused exam has been completed 2:10 PM.  2. Aggressive ivf were administered  3. Does not need pressors  4. Iv antibiotics  5. Urology consult  6.  May be able to transfer out of icu later today    Consultations:   [facility] UROLOGY     Patient is admitted as inpatient status because of co-morbidities listed above, severity of signs and symptoms as outlined, requirement for current medical therapies and most importantly because of direct risk to patient if care not provided in a hospital setting.     Yung Parson DO  10/24/2018  2:42 PM    Copy sent to Dr. Darvin Arrieta MD

## 2018-10-24 NOTE — ED NOTES
Pt. Yun Escobar in per squad for fatigue, fever, diaphoresis, and SOB. Pt. States he had 11 liters removed on Monday from bladder due to an enlarged prostate. Has ogden in place. Pt. Believes his fever started yesterday. Pt. extremely drowsy, but arousable and cooperative. Diaphoretic. Denies pain.       Rafael Benavides RN  10/24/18 6104

## 2018-10-24 NOTE — PLAN OF CARE
Problem: Falls - Risk of:  Goal: Will remain free from falls  Will remain free from falls   Outcome: Ongoing  No fall this shift

## 2018-10-24 NOTE — ED PROVIDER NOTES
or here. XR CHEST PORTABLE   Final Result   Borderline cardiomegaly. Low lung volumes with minimal bibasilar opacity   likely related atelectasis with mild infiltrate not excluded. RECOMMENDATION:   PA and lateral formal views of the chest may prove helpful for best   assessment. LABS: All lab results were reviewed by myself, and all abnormals are listed below. Labs Reviewed   LACTATE, SEPSIS - Abnormal; Notable for the following:        Result Value    Lactic Acid, Sepsis 8.6 (*)     All other components within normal limits   LACTATE, SEPSIS - Abnormal; Notable for the following:     Lactic Acid, Sepsis 3.0 (*)     All other components within normal limits   CBC WITH AUTO DIFFERENTIAL - Abnormal; Notable for the following:     WBC 17.5 (*)     RDW 14.7 (*)     Seg Neutrophils 93 (*)     Lymphocytes 5 (*)     Eosinophils % 0 (*)     Segs Absolute 16.27 (*)     Absolute Lymph # 0.88 (*)     All other components within normal limits   BASIC METABOLIC PANEL - Abnormal; Notable for the following:     Glucose 216 (*)     CREATININE 1.67 (*)     CO2 16 (*)     Anion Gap 24 (*)     GFR Non- 42 (*)     GFR  51 (*)     All other components within normal limits   URINE RT REFLEX TO CULTURE - Abnormal; Notable for the following:     Turbidity UA CLOUDY (*)     Specific Gravity, UA >1.030 (*)     Urine Hgb 3+ (*)     Protein, UA 3+ (*)     Nitrite, Urine POSITIVE (*)     Leukocyte Esterase, Urine SMALL (*)     All other components within normal limits   MICROSCOPIC URINALYSIS - Abnormal; Notable for the following:     Bacteria, UA MANY (*)     All other components within normal limits   CULTURE BLOOD #1   CULTURE BLOOD #2   CULTURE, URINE CATHETER           I personally evaluated and examined the patient in conjunction with the APC and agree with the assessment, treatment plan, and disposition of the patient as recorded by the APC.    Thomas Smith MD  Attending Emergency

## 2018-10-25 PROBLEM — N39.0 URINARY TRACT INFECTION ASSOCIATED WITH INDWELLING URETHRAL CATHETER (HCC): Status: ACTIVE | Noted: 2018-10-25

## 2018-10-25 PROBLEM — T83.511A URINARY TRACT INFECTION ASSOCIATED WITH INDWELLING URETHRAL CATHETER (HCC): Status: ACTIVE | Noted: 2018-10-25

## 2018-10-25 PROBLEM — A41.51 E. COLI SEPSIS (HCC): Status: ACTIVE | Noted: 2018-10-25

## 2018-10-25 LAB
ANION GAP SERPL CALCULATED.3IONS-SCNC: 12 MMOL/L (ref 9–17)
BUN BLDV-MCNC: 21 MG/DL (ref 8–23)
BUN/CREAT BLD: 15 (ref 9–20)
CALCIUM SERPL-MCNC: 8.2 MG/DL (ref 8.6–10.4)
CHLORIDE BLD-SCNC: 104 MMOL/L (ref 98–107)
CO2: 24 MMOL/L (ref 20–31)
CREAT SERPL-MCNC: 1.36 MG/DL (ref 0.7–1.2)
CULTURE: ABNORMAL
GFR AFRICAN AMERICAN: >60 ML/MIN
GFR NON-AFRICAN AMERICAN: 53 ML/MIN
GFR SERPL CREATININE-BSD FRML MDRD: ABNORMAL ML/MIN/{1.73_M2}
GFR SERPL CREATININE-BSD FRML MDRD: ABNORMAL ML/MIN/{1.73_M2}
GLUCOSE BLD-MCNC: 127 MG/DL (ref 70–99)
HCT VFR BLD CALC: 36.6 % (ref 41–53)
HEMOGLOBIN: 11.9 G/DL (ref 13.5–17.5)
Lab: ABNORMAL
MCH RBC QN AUTO: 26.5 PG (ref 26–34)
MCHC RBC AUTO-ENTMCNC: 32.6 G/DL (ref 31–37)
MCV RBC AUTO: 81.3 FL (ref 80–100)
NRBC AUTOMATED: ABNORMAL PER 100 WBC
ORGANISM: ABNORMAL
PDW BLD-RTO: 14.9 % (ref 11.5–14.5)
PLATELET # BLD: 167 K/UL (ref 130–400)
PMV BLD AUTO: 8.1 FL (ref 6–12)
POTASSIUM SERPL-SCNC: 4.2 MMOL/L (ref 3.7–5.3)
RBC # BLD: 4.5 M/UL (ref 4.5–5.9)
SODIUM BLD-SCNC: 140 MMOL/L (ref 135–144)
SPECIMEN DESCRIPTION: ABNORMAL
STATUS: ABNORMAL
WBC # BLD: 13.5 K/UL (ref 3.5–11)

## 2018-10-25 PROCEDURE — 36415 COLL VENOUS BLD VENIPUNCTURE: CPT

## 2018-10-25 PROCEDURE — 6370000000 HC RX 637 (ALT 250 FOR IP): Performed by: NURSE PRACTITIONER

## 2018-10-25 PROCEDURE — 80048 BASIC METABOLIC PNL TOTAL CA: CPT

## 2018-10-25 PROCEDURE — 2500000003 HC RX 250 WO HCPCS: Performed by: INTERNAL MEDICINE

## 2018-10-25 PROCEDURE — 6360000002 HC RX W HCPCS: Performed by: NURSE PRACTITIONER

## 2018-10-25 PROCEDURE — 51702 INSERT TEMP BLADDER CATH: CPT

## 2018-10-25 PROCEDURE — 99232 SBSQ HOSP IP/OBS MODERATE 35: CPT | Performed by: INTERNAL MEDICINE

## 2018-10-25 PROCEDURE — 2580000003 HC RX 258: Performed by: INTERNAL MEDICINE

## 2018-10-25 PROCEDURE — 6370000000 HC RX 637 (ALT 250 FOR IP): Performed by: INTERNAL MEDICINE

## 2018-10-25 PROCEDURE — 6360000002 HC RX W HCPCS: Performed by: INTERNAL MEDICINE

## 2018-10-25 PROCEDURE — 85027 COMPLETE CBC AUTOMATED: CPT

## 2018-10-25 PROCEDURE — 1200000000 HC SEMI PRIVATE

## 2018-10-25 RX ORDER — DILTIAZEM HYDROCHLORIDE 180 MG/1
180 CAPSULE, COATED, EXTENDED RELEASE ORAL DAILY
Status: DISCONTINUED | OUTPATIENT
Start: 2018-10-25 | End: 2018-10-27

## 2018-10-25 RX ADMIN — SODIUM CHLORIDE: 9 INJECTION, SOLUTION INTRAVENOUS at 01:46

## 2018-10-25 RX ADMIN — TAZOBACTAM SODIUM AND PIPERACILLIN SODIUM 3.38 G: 375; 3 INJECTION, SOLUTION INTRAVENOUS at 17:55

## 2018-10-25 RX ADMIN — HYDRALAZINE HYDROCHLORIDE 10 MG: 20 INJECTION INTRAMUSCULAR; INTRAVENOUS at 11:14

## 2018-10-25 RX ADMIN — TAMSULOSIN HYDROCHLORIDE 0.4 MG: 0.4 CAPSULE ORAL at 09:01

## 2018-10-25 RX ADMIN — TAZOBACTAM SODIUM AND PIPERACILLIN SODIUM 3.38 G: 375; 3 INJECTION, SOLUTION INTRAVENOUS at 09:01

## 2018-10-25 RX ADMIN — TAZOBACTAM SODIUM AND PIPERACILLIN SODIUM 3.38 G: 375; 3 INJECTION, SOLUTION INTRAVENOUS at 00:47

## 2018-10-25 RX ADMIN — MULTIPLE VITAMINS W/ MINERALS TAB 1 TABLET: TAB at 17:56

## 2018-10-25 RX ADMIN — FINASTERIDE 5 MG: 5 TABLET, FILM COATED ORAL at 09:01

## 2018-10-25 RX ADMIN — ENOXAPARIN SODIUM 30 MG: 30 INJECTION SUBCUTANEOUS at 09:01

## 2018-10-25 RX ADMIN — ENOXAPARIN SODIUM 30 MG: 30 INJECTION SUBCUTANEOUS at 20:41

## 2018-10-25 RX ADMIN — DILTIAZEM HYDROCHLORIDE 180 MG: 180 CAPSULE, COATED, EXTENDED RELEASE ORAL at 12:26

## 2018-10-25 ASSESSMENT — PAIN SCALES - GENERAL: PAINLEVEL_OUTOF10: 0

## 2018-10-25 NOTE — CONSULTS
[facility]  Urology Consultation    Patient:  David Larios  MRN: [id number]  YOB: 1955    CHIEF COMPLAINT:  Fever emesis sepsis    HISTORY OF PRESENT ILLNESS:   The patient is a 61 y.o. male who presents with generalized symptoms as mentioned above, the patient has chronic urinary retention, hypotonic bladder dysfunction, he originally came in with severe acute kidney injury with a creatinine of 20. He also had a PSA markedly elevated 118  the patient underwent ultrasound-guided biopsy that showed no evidence of cancer. PSA presently down to 9  patient states that he always has had a high PSA in the range of 8-9    The renal function improved with the indwelling Ogden. A trial of voiding was initiated on Monday catheter was removed in the morning at the office. The patient returned in the afternoon unable to urinate a catheter was then inserted  He presented to the hospital with symptoms as mentioned above and treated accordingly  I had suggested to the patient at the office because of the hypotonic bladder dysfunction to try intermittent self-catheterization. The patient at the time showed no interest in the process and requested catheter reinsertion which was completed uneventfully and the patient was scheduled for a follow-up office visit, but he then presented to the emergency room yesterday with symptoms as reported      Nursing staff attempted to insert a three-way Ogden but had difficulty    A #16 catheter was then suggested  Patient's old records, notes and chart reviewed and summarized above.     Past Medical History:    Past Medical History:   Diagnosis Date    Elevated PSA     Hypertension     Kidney stone     Seasonal allergies     SVT (supraventricular tachycardia) ([facility] [address] 2018       Past Surgical History:    Past Surgical History:   Procedure Laterality Date    CYSTOSCOPY      CYSTOSCOPY  09/07/2018    with ogden exchange    OR GENITAL SURG PROC,MALE UNLISTED N/A

## 2018-10-25 NOTE — DISCHARGE INSTR - COC
disease) stage 3, GFR 30-59 ml/min ([facility]) N18.3    E. coli sepsis ([facility]) A41.51    Urinary tract infection associated with indwelling urethral catheter ([facility]) T83.511A, N39.0       Isolation/Infection:   Isolation          No Isolation            Nurse Assessment:lungs clear, diminished in the bases. He has  Good appetite. He is ambulatory independently. Needs ogden care and PICC management with IV antibiotic therapy. Last Vital Signs: BP (!) 169/83   Pulse 93   Temp 99.3 °F (37.4 °C) (Oral)   Resp 12   Ht 6' (1.829 m)   Wt 240 lb (108.9 kg)   SpO2 91%   BMI 32.55 kg/m²     Last documented pain score (0-10 scale): Pain Level:  (temp 100.3)  Last Weight:   Wt Readings from Last 1 Encounters:   10/24/18 240 lb (108.9 kg)     Mental Status:  oriented, alert, coherent, logical, thought processes intact and able to concentrate and follow conversation    IV Access:  - PICC - site  R Upper Arm, insertion date: 10-      Nursing Mobility/ADLs:  Walking   Independent  Transfer  Independent  Bathing  Independent  Dressing  Independent  [address] Delivery   whole    [address] and Therapy:no open wounds. Elimination:  Continence:   · Bowel: Yes  · Bladder: No  Urinary Catheter: Insertion Date: 10-   Colostomy/Ileostomy/Ileal Conduit: No       Date of Last BM: 10-    Intake/Output Summary (Last 24 hours) at 10/25/18 1249  Last data filed at 10/25/18 0900   Gross per 24 hour   Intake             2431 ml   Output             1000 ml   Net             1431 ml     I/O last 3 completed shifts: In: 7625 [I.V.:1711]  Out: 1050 [Urine:1050]    Safety Concerns:     None    Impairments/Disabilities:   Pt. Has  Ongoing sepsis with  E-Coli  Infection and needs 8 more days of IV rocephin to treat positive urine and blood cultures. He had the ogden before admission and is to continue its use at discharge.   RTO to see

## 2018-10-25 NOTE — PLAN OF CARE
Problem: Falls - Risk of:  Goal: Will remain free from falls  Will remain free from falls   Outcome: Ongoing    Goal: Absence of physical injury  Absence of physical injury   Outcome: Ongoing      Problem: Skin Integrity:  Goal: Will show no infection signs and symptoms  Will show no infection signs and symptoms   Outcome: Ongoing

## 2018-10-25 NOTE — PLAN OF CARE
Problem: Falls - Risk of:  Goal: Will remain free from falls  Will remain free from falls   Outcome: Ongoing  Uses call light appropriately, fall precautions in place will continue to monitor.       Goal: Absence of physical injury  Absence of physical injury   Outcome: Ongoing      Problem: Skin Integrity:  Goal: Will show no infection signs and symptoms  Will show no infection signs and symptoms  Outcome: Ongoing  Continue to monitor skin integrity and IV sites    Goal: Absence of new skin breakdown  Absence of new skin breakdown  Outcome: Ongoing

## 2018-10-26 LAB
ANION GAP SERPL CALCULATED.3IONS-SCNC: 11 MMOL/L (ref 9–17)
BUN BLDV-MCNC: 17 MG/DL (ref 8–23)
BUN/CREAT BLD: 16 (ref 9–20)
CALCIUM SERPL-MCNC: 8 MG/DL (ref 8.6–10.4)
CHLORIDE BLD-SCNC: 104 MMOL/L (ref 98–107)
CO2: 23 MMOL/L (ref 20–31)
CREAT SERPL-MCNC: 1.07 MG/DL (ref 0.7–1.2)
CULTURE: ABNORMAL
GFR AFRICAN AMERICAN: >60 ML/MIN
GFR NON-AFRICAN AMERICAN: >60 ML/MIN
GFR SERPL CREATININE-BSD FRML MDRD: ABNORMAL ML/MIN/{1.73_M2}
GFR SERPL CREATININE-BSD FRML MDRD: ABNORMAL ML/MIN/{1.73_M2}
GLUCOSE BLD-MCNC: 146 MG/DL (ref 70–99)
HCT VFR BLD CALC: 32.4 % (ref 41–53)
HEMOGLOBIN: 10.9 G/DL (ref 13.5–17.5)
Lab: ABNORMAL
Lab: ABNORMAL
MAGNESIUM: 2.3 MG/DL (ref 1.6–2.6)
MCH RBC QN AUTO: 27 PG (ref 26–34)
MCHC RBC AUTO-ENTMCNC: 33.8 G/DL (ref 31–37)
MCV RBC AUTO: 80 FL (ref 80–100)
NRBC AUTOMATED: ABNORMAL PER 100 WBC
ORGANISM: ABNORMAL
PDW BLD-RTO: 15 % (ref 11.5–14.5)
PLATELET # BLD: 156 K/UL (ref 130–400)
PMV BLD AUTO: 7.9 FL (ref 6–12)
POTASSIUM SERPL-SCNC: 3.5 MMOL/L (ref 3.7–5.3)
RBC # BLD: 4.05 M/UL (ref 4.5–5.9)
SODIUM BLD-SCNC: 138 MMOL/L (ref 135–144)
SPECIMEN DESCRIPTION: ABNORMAL
SPECIMEN DESCRIPTION: ABNORMAL
STATUS: ABNORMAL
STATUS: ABNORMAL
WBC # BLD: 7.1 K/UL (ref 3.5–11)

## 2018-10-26 PROCEDURE — 6370000000 HC RX 637 (ALT 250 FOR IP): Performed by: NURSE PRACTITIONER

## 2018-10-26 PROCEDURE — 36415 COLL VENOUS BLD VENIPUNCTURE: CPT

## 2018-10-26 PROCEDURE — 85027 COMPLETE CBC AUTOMATED: CPT

## 2018-10-26 PROCEDURE — 2580000003 HC RX 258: Performed by: NURSE PRACTITIONER

## 2018-10-26 PROCEDURE — 80048 BASIC METABOLIC PNL TOTAL CA: CPT

## 2018-10-26 PROCEDURE — 2580000003 HC RX 258: Performed by: INTERNAL MEDICINE

## 2018-10-26 PROCEDURE — 83735 ASSAY OF MAGNESIUM: CPT

## 2018-10-26 PROCEDURE — 6360000002 HC RX W HCPCS: Performed by: INTERNAL MEDICINE

## 2018-10-26 PROCEDURE — 6360000002 HC RX W HCPCS: Performed by: NURSE PRACTITIONER

## 2018-10-26 PROCEDURE — 1200000000 HC SEMI PRIVATE

## 2018-10-26 PROCEDURE — 2500000003 HC RX 250 WO HCPCS: Performed by: INTERNAL MEDICINE

## 2018-10-26 PROCEDURE — 6370000000 HC RX 637 (ALT 250 FOR IP): Performed by: INTERNAL MEDICINE

## 2018-10-26 PROCEDURE — 87040 BLOOD CULTURE FOR BACTERIA: CPT

## 2018-10-26 PROCEDURE — 99232 SBSQ HOSP IP/OBS MODERATE 35: CPT | Performed by: INTERNAL MEDICINE

## 2018-10-26 RX ORDER — POTASSIUM CHLORIDE 7.45 MG/ML
10 INJECTION INTRAVENOUS PRN
Status: DISCONTINUED | OUTPATIENT
Start: 2018-10-26 | End: 2018-10-29 | Stop reason: HOSPADM

## 2018-10-26 RX ORDER — POTASSIUM CHLORIDE 20 MEQ/1
40 TABLET, EXTENDED RELEASE ORAL PRN
Status: DISCONTINUED | OUTPATIENT
Start: 2018-10-26 | End: 2018-10-29 | Stop reason: HOSPADM

## 2018-10-26 RX ORDER — POTASSIUM CHLORIDE 20MEQ/15ML
40 LIQUID (ML) ORAL PRN
Status: DISCONTINUED | OUTPATIENT
Start: 2018-10-26 | End: 2018-10-29 | Stop reason: HOSPADM

## 2018-10-26 RX ADMIN — TAZOBACTAM SODIUM AND PIPERACILLIN SODIUM 3.38 G: 375; 3 INJECTION, SOLUTION INTRAVENOUS at 01:36

## 2018-10-26 RX ADMIN — SODIUM CHLORIDE: 9 INJECTION, SOLUTION INTRAVENOUS at 04:09

## 2018-10-26 RX ADMIN — DILTIAZEM HYDROCHLORIDE 180 MG: 180 CAPSULE, COATED, EXTENDED RELEASE ORAL at 08:50

## 2018-10-26 RX ADMIN — FINASTERIDE 5 MG: 5 TABLET, FILM COATED ORAL at 08:50

## 2018-10-26 RX ADMIN — CEFTRIAXONE SODIUM 1 G: 1 INJECTION, POWDER, FOR SOLUTION INTRAMUSCULAR; INTRAVENOUS at 21:27

## 2018-10-26 RX ADMIN — TAZOBACTAM SODIUM AND PIPERACILLIN SODIUM 3.38 G: 375; 3 INJECTION, SOLUTION INTRAVENOUS at 08:51

## 2018-10-26 RX ADMIN — Medication 10 ML: at 21:28

## 2018-10-26 RX ADMIN — POTASSIUM CHLORIDE 40 MEQ: 20 TABLET, EXTENDED RELEASE ORAL at 21:28

## 2018-10-26 RX ADMIN — ACETAMINOPHEN 650 MG: 325 TABLET ORAL at 10:58

## 2018-10-26 RX ADMIN — ACETAMINOPHEN 650 MG: 325 TABLET ORAL at 01:36

## 2018-10-26 RX ADMIN — ENOXAPARIN SODIUM 30 MG: 30 INJECTION SUBCUTANEOUS at 08:51

## 2018-10-26 RX ADMIN — TAMSULOSIN HYDROCHLORIDE 0.4 MG: 0.4 CAPSULE ORAL at 08:50

## 2018-10-26 RX ADMIN — HYDRALAZINE HYDROCHLORIDE 10 MG: 20 INJECTION INTRAMUSCULAR; INTRAVENOUS at 13:20

## 2018-10-26 RX ADMIN — ENOXAPARIN SODIUM 30 MG: 30 INJECTION SUBCUTANEOUS at 21:27

## 2018-10-26 RX ADMIN — MULTIPLE VITAMINS W/ MINERALS TAB 1 TABLET: TAB at 17:20

## 2018-10-26 ASSESSMENT — PAIN SCALES - GENERAL
PAINLEVEL_OUTOF10: 0
PAINLEVEL_OUTOF10: 3

## 2018-10-26 ASSESSMENT — PAIN DESCRIPTION - ORIENTATION: ORIENTATION: ANTERIOR

## 2018-10-26 ASSESSMENT — PAIN DESCRIPTION - DESCRIPTORS: DESCRIPTORS: ACHING

## 2018-10-26 ASSESSMENT — PAIN DESCRIPTION - PAIN TYPE: TYPE: ACUTE PAIN

## 2018-10-26 ASSESSMENT — PAIN DESCRIPTION - LOCATION: LOCATION: HEAD

## 2018-10-26 ASSESSMENT — PAIN DESCRIPTION - FREQUENCY: FREQUENCY: INTERMITTENT

## 2018-10-26 NOTE — PROGRESS NOTES
ANTIMICROBIAL STEWARDSHIP  Upon review of the patient's chart, the committee has the following recommendation for your consideration:     Patient has been empirically on zosyn for sepsis. E coli was grown out of blood/curine cultures. WBC trending down. Afebrile >48 hours. Please de-escalate zosyn to ceftriaxone or another susceptible agent with narrower coverage. Temp Readings from Last 3 Encounters:   10/26/18 100.9 °F (38.3 °C) (Oral)   09/07/18 97.2 °F (36.2 °C) (Temporal)   08/30/18 97.5 °F (36.4 °C) (Oral)        Recent Labs      10/25/18   0456  10/26/18   0621   WBC  13.5*  7.1         Thank you. Janice Acosta, PharmD  10/26/2018  11:54 AM    The Antimicrobial Stewardship Committee at [facility] is led by  Kate Adamson MD, Infectious Diseases Section Chair.

## 2018-10-26 NOTE — FLOWSHEET NOTE
[facility]    [facility] will follow as needed. 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

## 2018-10-26 NOTE — PROGRESS NOTES
therapeutic multivitamin-minerals  1 tablet Oral Daily    enoxaparin  30 mg Subcutaneous BID    piperacillin-tazobactam  3.375 g Intravenous Q8H    LORazepam  1 mg Intravenous Once     Continuous Infusions:    sodium chloride 100 mL/hr at 10/26/18 0409     PRN Meds: sodium chloride flush, acetaminophen, acetaminophen, metoprolol, hydrALAZINE    Data:     Past Medical History:   has a past medical history of Elevated PSA; Hypertension; Kidney stone; Seasonal allergies; and SVT (supraventricular tachycardia) ([facility] [address] Social History:   reports that he has never smoked. He has never used smokeless tobacco. He reports that he drinks alcohol. He reports that he does not use drugs. Family History:   Family History   Problem Relation Age of Onset    Hypertension Mother     Diabetes Father     Parkinsonism Father     Alzheimer's Disease Father        Vitals:  BP (!) 151/73   Pulse 96   Temp 97.9 °F (36.6 °C) (Oral)   Resp 16   Ht 6' (1.829 m)   Wt 243 lb 8 oz (110.5 kg)   SpO2 90%   BMI 33.02 kg/m²   Temp (24hrs), Av.3 °F (37.4 °C), Min:97.9 °F (36.6 °C), Max:101.7 °F (38.7 °C)    No results for input(s): POCGLU in the last 72 hours. I/O (24Hr):     Intake/Output Summary (Last 24 hours) at 10/26/18 1343  Last data filed at 10/26/18 1253   Gross per 24 hour   Intake             2600 ml   Output             1950 ml   Net              650 ml       Labs:    Hematology:  Recent Labs      10/24/18   0820  10/25/18   0456  10/26/18   0621   WBC  17.5*  13.5*  7.1   RBC  5.29  4.50  4.05*   HGB  14.1  11.9*  10.9*   HCT  42.5  36.6*  32.4*   MCV  80.4  81.3  80.0   MCH  26.7  26.5  27.0   MCHC  33.2  32.6  33.8   RDW  14.7*  14.9*  15.0*   PLT  271  167  156   MPV  7.4  8.1  7.9     Chemistry:  Recent Labs      10/24/18   0820  10/25/18   0456  10/26/18   0621   NA  139  140  138   K  3.8  4.2  3.5*   CL  99  104  104   CO2  16*  24  23   GLUCOSE  216*  127*  146*   BUN  19  21  17   CREATININE  1.67* 1.36*  1.07   MG   --    --   2.3   ANIONGAP  24*  12  11   LABGLOM  42*  53*  >60   GFRAA  51*  >60  >60   CALCIUM  9.2  8.2*  8.0*     No results for input(s): PROT, LABALBU, LABA1C, A9CZLIU, K9IWGFC, FT4, TSH, AST, ALT, LDH, GGT, ALKPHOS, LABGGT, BILITOT, BILIDIR, AMMONIA, AMYLASE, LIPASE, LACTATE, CHOL, HDL, LDLCHOLESTEROL, CHOLHDLRATIO, TRIG, VLDL, XVW25UY, PHENYTOIN, PHENYF, URICACID, POCGLU in the last 72 hours.       Lab Results   Component Value Date/Time    SPECIAL RIGHT AC, 11ML 10/24/2018 08:32 AM     Lab Results   Component Value Date/Time    CULTURE (A) 10/24/2018 08:32 AM     POSITIVE BLOOD CULTURE, RN NOTIFIEDMaribeth Ruts [phone]    CULTURE DIRECT GRAM STAIN FROM BOTTLE: GRAM NEGATIVE RODS 10/24/2018 08:32 AM    CULTURE ID by PNAFISH: ESCHERICHIA COLI (A) 10/24/2018 08:32 AM    CULTURE ESCHERICHIA COLI (A) 10/24/2018 08:32 AM     Urine also with e coli, same strain    Lab Results   Component Value Date    POCPH 7.48 08/19/2018    POCPCO2 25 08/19/2018    POCPO2 72 08/19/2018    POCHCO3 18.8 08/19/2018    NBEA 5 08/19/2018    PBEA NOT REPORTED 08/19/2018    XAG2ZQH 20 08/19/2018    COQP8LSQ 96 08/19/2018    FIO2 36.0 08/19/2018       Radiology:    Nothing new      Physical Examination:        General appearance:  alert, cooperative and no distress  Mental Status:  oriented to person, place and time and normal affect  Lungs:  clear to auscultation bilaterally, normal effort  Heart:  regular rate and rhythm, no murmur  Abdomen:  soft, nontender, nondistended, normal bowel sounds, no masses, hepatomegaly, splenomegaly; obese  Extremities:  no edema, redness, tenderness in the calves  Skin:  no gross lesions, rashes, induration    Assessment:        Primary Problem  Severe sepsis [facility])    Active Hospital Problems    Diagnosis Date Noted    [facility] [A41.51] 10/25/2018    Urinary tract infection associated with indwelling urethral catheter ([facility] [H06.847S, N39.0] 10/25/2018    Severe

## 2018-10-26 NOTE — PROGRESS NOTES
discussed status with primary care service Dr. Yahaira Parson, the patient has neurogenic bladder dysfunction, his Whitmore catheter was removed for 8 hours he was not able to urinate catheter was reinserted on Monday  It is doubtful that this patient can void spontaneously at this time considering that he had more than 4 L in his bladder.     We discussed intermittent self-catheterization  We also discussed repeat cystoscopy    Interval Imaging Findings:    Impression:    Patient Active Problem List   Diagnosis    Urinary retention    Elevated PSA    Hyperkalemia    Hypertension    Renal failure    Obstructive uropathy    Hydronephrosis    [facility]    Hypertrophy of prostate with urinary retention    Incomplete RBBB    Type 2 diabetes mellitus ([address]    Urine retention    PSA elevation    Severe sepsis ([facility])    CKD (chronic kidney disease) stage 3, GFR 30-59 ml/min ([facility])    E. coli sepsis ([address]    Urinary tract infection associated with indwelling urethral catheter ([address]       Plan: Since the patient is still febrile we will hold off on instrumentation of the urinary tract    Walt Marshall  4:53 AM 10/26/2018

## 2018-10-26 NOTE — PROGRESS NOTES
Dr. Airam Harvey at patients bedside. [facility] patient can be discharged from his standpoint. [facility] patient needs to follow up with him next Tuesday or Thursday, once infection has been resolved.

## 2018-10-27 LAB
ANION GAP SERPL CALCULATED.3IONS-SCNC: 10 MMOL/L (ref 9–17)
BUN BLDV-MCNC: 16 MG/DL (ref 8–23)
BUN/CREAT BLD: 19 (ref 9–20)
CALCIUM SERPL-MCNC: 8 MG/DL (ref 8.6–10.4)
CHLORIDE BLD-SCNC: 104 MMOL/L (ref 98–107)
CO2: 23 MMOL/L (ref 20–31)
CREAT SERPL-MCNC: 0.86 MG/DL (ref 0.7–1.2)
GFR AFRICAN AMERICAN: >60 ML/MIN
GFR NON-AFRICAN AMERICAN: >60 ML/MIN
GFR SERPL CREATININE-BSD FRML MDRD: ABNORMAL ML/MIN/{1.73_M2}
GFR SERPL CREATININE-BSD FRML MDRD: ABNORMAL ML/MIN/{1.73_M2}
GLUCOSE BLD-MCNC: 132 MG/DL (ref 70–99)
HCT VFR BLD CALC: 32.2 % (ref 41–53)
HEMOGLOBIN: 10.8 G/DL (ref 13.5–17.5)
MCH RBC QN AUTO: 26.9 PG (ref 26–34)
MCHC RBC AUTO-ENTMCNC: 33.6 G/DL (ref 31–37)
MCV RBC AUTO: 80.1 FL (ref 80–100)
NRBC AUTOMATED: ABNORMAL PER 100 WBC
PDW BLD-RTO: 14.9 % (ref 11.5–14.5)
PLATELET # BLD: 142 K/UL (ref 130–400)
PMV BLD AUTO: 8 FL (ref 6–12)
POTASSIUM SERPL-SCNC: 3.8 MMOL/L (ref 3.7–5.3)
RBC # BLD: 4.03 M/UL (ref 4.5–5.9)
SODIUM BLD-SCNC: 137 MMOL/L (ref 135–144)
WBC # BLD: 6.4 K/UL (ref 3.5–11)

## 2018-10-27 PROCEDURE — 6360000002 HC RX W HCPCS: Performed by: NURSE PRACTITIONER

## 2018-10-27 PROCEDURE — 6370000000 HC RX 637 (ALT 250 FOR IP): Performed by: INTERNAL MEDICINE

## 2018-10-27 PROCEDURE — 2580000003 HC RX 258: Performed by: INTERNAL MEDICINE

## 2018-10-27 PROCEDURE — 6370000000 HC RX 637 (ALT 250 FOR IP): Performed by: NURSE PRACTITIONER

## 2018-10-27 PROCEDURE — 80048 BASIC METABOLIC PNL TOTAL CA: CPT

## 2018-10-27 PROCEDURE — 85027 COMPLETE CBC AUTOMATED: CPT

## 2018-10-27 PROCEDURE — 6360000002 HC RX W HCPCS: Performed by: INTERNAL MEDICINE

## 2018-10-27 PROCEDURE — 36415 COLL VENOUS BLD VENIPUNCTURE: CPT

## 2018-10-27 PROCEDURE — 2580000003 HC RX 258: Performed by: NURSE PRACTITIONER

## 2018-10-27 PROCEDURE — 1200000000 HC SEMI PRIVATE

## 2018-10-27 PROCEDURE — 99232 SBSQ HOSP IP/OBS MODERATE 35: CPT | Performed by: INTERNAL MEDICINE

## 2018-10-27 RX ORDER — ACETAMINOPHEN 325 MG/1
650 TABLET ORAL EVERY 4 HOURS PRN
Status: DISCONTINUED | OUTPATIENT
Start: 2018-10-27 | End: 2018-10-29 | Stop reason: HOSPADM

## 2018-10-27 RX ORDER — FLUTICASONE PROPIONATE 50 MCG
2 SPRAY, SUSPENSION (ML) NASAL DAILY
Status: DISCONTINUED | OUTPATIENT
Start: 2018-10-27 | End: 2018-10-29 | Stop reason: HOSPADM

## 2018-10-27 RX ORDER — ACETAMINOPHEN 650 MG/1
650 SUPPOSITORY RECTAL EVERY 4 HOURS PRN
Status: DISCONTINUED | OUTPATIENT
Start: 2018-10-27 | End: 2018-10-29 | Stop reason: HOSPADM

## 2018-10-27 RX ORDER — CETIRIZINE HYDROCHLORIDE 10 MG/1
10 TABLET ORAL DAILY
Status: DISCONTINUED | OUTPATIENT
Start: 2018-10-27 | End: 2018-10-29 | Stop reason: HOSPADM

## 2018-10-27 RX ORDER — ALBUTEROL SULFATE 2.5 MG/3ML
2.5 SOLUTION RESPIRATORY (INHALATION)
Status: DISCONTINUED | OUTPATIENT
Start: 2018-10-27 | End: 2018-10-29 | Stop reason: HOSPADM

## 2018-10-27 RX ADMIN — ENOXAPARIN SODIUM 30 MG: 30 INJECTION SUBCUTANEOUS at 21:28

## 2018-10-27 RX ADMIN — HYDRALAZINE HYDROCHLORIDE 10 MG: 20 INJECTION INTRAMUSCULAR; INTRAVENOUS at 16:21

## 2018-10-27 RX ADMIN — FINASTERIDE 5 MG: 5 TABLET, FILM COATED ORAL at 08:53

## 2018-10-27 RX ADMIN — DILTIAZEM HYDROCHLORIDE 180 MG: 180 CAPSULE, COATED, EXTENDED RELEASE ORAL at 08:53

## 2018-10-27 RX ADMIN — TAMSULOSIN HYDROCHLORIDE 0.4 MG: 0.4 CAPSULE ORAL at 08:53

## 2018-10-27 RX ADMIN — CEFTRIAXONE SODIUM 1 G: 1 INJECTION, POWDER, FOR SOLUTION INTRAMUSCULAR; INTRAVENOUS at 21:29

## 2018-10-27 RX ADMIN — Medication 10 ML: at 21:28

## 2018-10-27 RX ADMIN — HYDRALAZINE HYDROCHLORIDE 10 MG: 20 INJECTION INTRAMUSCULAR; INTRAVENOUS at 08:53

## 2018-10-27 RX ADMIN — CETIRIZINE HYDROCHLORIDE 10 MG: 10 TABLET, FILM COATED ORAL at 02:43

## 2018-10-27 RX ADMIN — ACETAMINOPHEN 650 MG: 325 TABLET ORAL at 08:55

## 2018-10-27 RX ADMIN — MULTIPLE VITAMINS W/ MINERALS TAB 1 TABLET: TAB at 16:21

## 2018-10-27 RX ADMIN — Medication 10 ML: at 08:54

## 2018-10-27 RX ADMIN — ENOXAPARIN SODIUM 30 MG: 30 INJECTION SUBCUTANEOUS at 08:54

## 2018-10-27 RX ADMIN — FLUTICASONE PROPIONATE 2 SPRAY: 50 SPRAY, METERED NASAL at 11:18

## 2018-10-27 RX ADMIN — HYDRALAZINE HYDROCHLORIDE 10 MG: 20 INJECTION INTRAMUSCULAR; INTRAVENOUS at 22:52

## 2018-10-27 ASSESSMENT — PAIN DESCRIPTION - ONSET: ONSET: GRADUAL

## 2018-10-27 ASSESSMENT — PAIN SCALES - GENERAL
PAINLEVEL_OUTOF10: 1
PAINLEVEL_OUTOF10: 0
PAINLEVEL_OUTOF10: 1

## 2018-10-27 ASSESSMENT — PAIN DESCRIPTION - PAIN TYPE: TYPE: OTHER (COMMENT)

## 2018-10-27 ASSESSMENT — PAIN DESCRIPTION - FREQUENCY: FREQUENCY: INTERMITTENT

## 2018-10-27 NOTE — PLAN OF CARE
Problem: Falls - Risk of:  Goal: Will remain free from falls  Will remain free from falls   Outcome: Ongoing  Bed locked in lowest position with siderails up x2. Non-skid footwear on and call light within reach. Patient instructed to call out when needing assistance ambulating. Environment is free of hazards. Patient remains free of falls and injury. Problem: Infection, Septic Shock:  Goal: Will show no infection signs and symptoms  Will show no infection signs and symptoms   Outcome: Ongoing  Pt on 1g rocephin Q24H, with Q4H vitals to be taken. Pt vitals maintained at this time, showing no new S/S of infection.

## 2018-10-28 LAB
ANION GAP SERPL CALCULATED.3IONS-SCNC: 13 MMOL/L (ref 9–17)
BUN BLDV-MCNC: 15 MG/DL (ref 8–23)
BUN/CREAT BLD: 17 (ref 9–20)
CALCIUM SERPL-MCNC: 8.5 MG/DL (ref 8.6–10.4)
CHLORIDE BLD-SCNC: 104 MMOL/L (ref 98–107)
CO2: 24 MMOL/L (ref 20–31)
CREAT SERPL-MCNC: 0.9 MG/DL (ref 0.7–1.2)
GFR AFRICAN AMERICAN: >60 ML/MIN
GFR NON-AFRICAN AMERICAN: >60 ML/MIN
GFR SERPL CREATININE-BSD FRML MDRD: ABNORMAL ML/MIN/{1.73_M2}
GFR SERPL CREATININE-BSD FRML MDRD: ABNORMAL ML/MIN/{1.73_M2}
GLUCOSE BLD-MCNC: 126 MG/DL (ref 70–99)
HCT VFR BLD CALC: 33.8 % (ref 41–53)
HEMOGLOBIN: 11.3 G/DL (ref 13.5–17.5)
MCH RBC QN AUTO: 26.6 PG (ref 26–34)
MCHC RBC AUTO-ENTMCNC: 33.4 G/DL (ref 31–37)
MCV RBC AUTO: 79.6 FL (ref 80–100)
NRBC AUTOMATED: ABNORMAL PER 100 WBC
PDW BLD-RTO: 15.1 % (ref 11.5–14.5)
PLATELET # BLD: 190 K/UL (ref 130–400)
PMV BLD AUTO: 7.7 FL (ref 6–12)
POTASSIUM SERPL-SCNC: 3.7 MMOL/L (ref 3.7–5.3)
RBC # BLD: 4.25 M/UL (ref 4.5–5.9)
SODIUM BLD-SCNC: 141 MMOL/L (ref 135–144)
WBC # BLD: 7.7 K/UL (ref 3.5–11)

## 2018-10-28 PROCEDURE — 6370000000 HC RX 637 (ALT 250 FOR IP): Performed by: INTERNAL MEDICINE

## 2018-10-28 PROCEDURE — 6360000002 HC RX W HCPCS: Performed by: INTERNAL MEDICINE

## 2018-10-28 PROCEDURE — 6370000000 HC RX 637 (ALT 250 FOR IP): Performed by: NURSE PRACTITIONER

## 2018-10-28 PROCEDURE — 2500000003 HC RX 250 WO HCPCS: Performed by: NURSE PRACTITIONER

## 2018-10-28 PROCEDURE — 2580000003 HC RX 258: Performed by: NURSE PRACTITIONER

## 2018-10-28 PROCEDURE — 2580000003 HC RX 258: Performed by: INTERNAL MEDICINE

## 2018-10-28 PROCEDURE — 80048 BASIC METABOLIC PNL TOTAL CA: CPT

## 2018-10-28 PROCEDURE — 85027 COMPLETE CBC AUTOMATED: CPT

## 2018-10-28 PROCEDURE — 99232 SBSQ HOSP IP/OBS MODERATE 35: CPT | Performed by: INTERNAL MEDICINE

## 2018-10-28 PROCEDURE — 36415 COLL VENOUS BLD VENIPUNCTURE: CPT

## 2018-10-28 PROCEDURE — APPNB15 APP NON BILLABLE TIME 0-15 MINS: Performed by: NURSE PRACTITIONER

## 2018-10-28 PROCEDURE — 1200000000 HC SEMI PRIVATE

## 2018-10-28 RX ADMIN — FINASTERIDE 5 MG: 5 TABLET, FILM COATED ORAL at 09:48

## 2018-10-28 RX ADMIN — CEFTRIAXONE SODIUM 1 G: 1 INJECTION, POWDER, FOR SOLUTION INTRAMUSCULAR; INTRAVENOUS at 20:51

## 2018-10-28 RX ADMIN — Medication 10 ML: at 20:52

## 2018-10-28 RX ADMIN — Medication 10 ML: at 09:50

## 2018-10-28 RX ADMIN — CETIRIZINE HYDROCHLORIDE 10 MG: 10 TABLET, FILM COATED ORAL at 09:48

## 2018-10-28 RX ADMIN — ENOXAPARIN SODIUM 30 MG: 30 INJECTION SUBCUTANEOUS at 09:49

## 2018-10-28 RX ADMIN — DILTIAZEM HYDROCHLORIDE 300 MG: 180 CAPSULE, COATED, EXTENDED RELEASE ORAL at 09:48

## 2018-10-28 RX ADMIN — MULTIPLE VITAMINS W/ MINERALS TAB 1 TABLET: TAB at 20:52

## 2018-10-28 RX ADMIN — METOPROLOL TARTRATE 5 MG: 5 INJECTION, SOLUTION INTRAVENOUS at 09:49

## 2018-10-28 RX ADMIN — ENOXAPARIN SODIUM 30 MG: 30 INJECTION SUBCUTANEOUS at 20:52

## 2018-10-28 RX ADMIN — TAMSULOSIN HYDROCHLORIDE 0.4 MG: 0.4 CAPSULE ORAL at 09:49

## 2018-10-28 NOTE — PROGRESS NOTES
Dr Viky Anglin to discuss possible options moving forward after his infection has resolved which include suprapubic catheter/ chronic indwelling ogden if he has a hypotonic bladder or possible outlet surgery if cystoscopy determines obstruction with a well enough functioning bladder.    Please call for any questions    Jose MD Kendy  9:19 PM 10/27/2018

## 2018-10-29 ENCOUNTER — APPOINTMENT (OUTPATIENT)
Dept: INTERVENTIONAL RADIOLOGY/VASCULAR | Age: 63
DRG: 698 | End: 2018-10-29

## 2018-10-29 VITALS
HEIGHT: 72 IN | RESPIRATION RATE: 20 BRPM | DIASTOLIC BLOOD PRESSURE: 85 MMHG | BODY MASS INDEX: 34.72 KG/M2 | HEART RATE: 83 BPM | TEMPERATURE: 97.7 F | OXYGEN SATURATION: 96 % | SYSTOLIC BLOOD PRESSURE: 154 MMHG | WEIGHT: 256.3 LBS

## 2018-10-29 LAB
ANION GAP SERPL CALCULATED.3IONS-SCNC: 14 MMOL/L (ref 9–17)
BUN BLDV-MCNC: 16 MG/DL (ref 8–23)
BUN/CREAT BLD: 17 (ref 9–20)
CALCIUM SERPL-MCNC: 8.4 MG/DL (ref 8.6–10.4)
CHLORIDE BLD-SCNC: 103 MMOL/L (ref 98–107)
CO2: 22 MMOL/L (ref 20–31)
CREAT SERPL-MCNC: 0.92 MG/DL (ref 0.7–1.2)
GFR AFRICAN AMERICAN: >60 ML/MIN
GFR NON-AFRICAN AMERICAN: >60 ML/MIN
GFR SERPL CREATININE-BSD FRML MDRD: ABNORMAL ML/MIN/{1.73_M2}
GFR SERPL CREATININE-BSD FRML MDRD: ABNORMAL ML/MIN/{1.73_M2}
GLUCOSE BLD-MCNC: 120 MG/DL (ref 70–99)
HCT VFR BLD CALC: 33.7 % (ref 41–53)
HEMOGLOBIN: 11.4 G/DL (ref 13.5–17.5)
MCH RBC QN AUTO: 26.5 PG (ref 26–34)
MCHC RBC AUTO-ENTMCNC: 33.7 G/DL (ref 31–37)
MCV RBC AUTO: 78.6 FL (ref 80–100)
NRBC AUTOMATED: ABNORMAL PER 100 WBC
PDW BLD-RTO: 15.3 % (ref 11.5–14.5)
PLATELET # BLD: 262 K/UL (ref 130–400)
PMV BLD AUTO: 7.4 FL (ref 6–12)
POTASSIUM SERPL-SCNC: 3.7 MMOL/L (ref 3.7–5.3)
RBC # BLD: 4.29 M/UL (ref 4.5–5.9)
SODIUM BLD-SCNC: 139 MMOL/L (ref 135–144)
WBC # BLD: 8.9 K/UL (ref 3.5–11)

## 2018-10-29 PROCEDURE — 36415 COLL VENOUS BLD VENIPUNCTURE: CPT

## 2018-10-29 PROCEDURE — 80048 BASIC METABOLIC PNL TOTAL CA: CPT

## 2018-10-29 PROCEDURE — 77001 FLUOROGUIDE FOR VEIN DEVICE: CPT | Performed by: RADIOLOGY

## 2018-10-29 PROCEDURE — 6360000002 HC RX W HCPCS: Performed by: NURSE PRACTITIONER

## 2018-10-29 PROCEDURE — 76937 US GUIDE VASCULAR ACCESS: CPT | Performed by: RADIOLOGY

## 2018-10-29 PROCEDURE — 6370000000 HC RX 637 (ALT 250 FOR IP): Performed by: NURSE PRACTITIONER

## 2018-10-29 PROCEDURE — 6370000000 HC RX 637 (ALT 250 FOR IP): Performed by: INTERNAL MEDICINE

## 2018-10-29 PROCEDURE — 2580000003 HC RX 258: Performed by: NURSE PRACTITIONER

## 2018-10-29 PROCEDURE — 02HV33Z INSERTION OF INFUSION DEVICE INTO SUPERIOR VENA CAVA, PERCUTANEOUS APPROACH: ICD-10-PCS | Performed by: RADIOLOGY

## 2018-10-29 PROCEDURE — 6360000002 HC RX W HCPCS: Performed by: INTERNAL MEDICINE

## 2018-10-29 PROCEDURE — 85027 COMPLETE CBC AUTOMATED: CPT

## 2018-10-29 PROCEDURE — 36569 INSJ PICC 5 YR+ W/O IMAGING: CPT | Performed by: RADIOLOGY

## 2018-10-29 PROCEDURE — 2580000003 HC RX 258: Performed by: INTERNAL MEDICINE

## 2018-10-29 PROCEDURE — C1751 CATH, INF, PER/CENT/MIDLINE: HCPCS

## 2018-10-29 PROCEDURE — 99239 HOSP IP/OBS DSCHRG MGMT >30: CPT | Performed by: INTERNAL MEDICINE

## 2018-10-29 RX ORDER — CETIRIZINE HYDROCHLORIDE 10 MG/1
10 TABLET ORAL DAILY
Qty: 10 TABLET | Refills: 0 | Status: SHIPPED | OUTPATIENT
Start: 2018-10-30

## 2018-10-29 RX ADMIN — CETIRIZINE HYDROCHLORIDE 10 MG: 10 TABLET, FILM COATED ORAL at 09:17

## 2018-10-29 RX ADMIN — DILTIAZEM HYDROCHLORIDE 300 MG: 180 CAPSULE, COATED, EXTENDED RELEASE ORAL at 09:17

## 2018-10-29 RX ADMIN — ENOXAPARIN SODIUM 30 MG: 30 INJECTION SUBCUTANEOUS at 09:17

## 2018-10-29 RX ADMIN — Medication 10 ML: at 18:07

## 2018-10-29 RX ADMIN — Medication 10 ML: at 09:21

## 2018-10-29 RX ADMIN — TAMSULOSIN HYDROCHLORIDE 0.4 MG: 0.4 CAPSULE ORAL at 09:17

## 2018-10-29 RX ADMIN — HYDRALAZINE HYDROCHLORIDE 10 MG: 20 INJECTION INTRAMUSCULAR; INTRAVENOUS at 00:13

## 2018-10-29 RX ADMIN — MULTIPLE VITAMINS W/ MINERALS TAB 1 TABLET: TAB at 18:16

## 2018-10-29 RX ADMIN — FINASTERIDE 5 MG: 5 TABLET, FILM COATED ORAL at 09:17

## 2018-10-29 RX ADMIN — CEFTRIAXONE SODIUM 1 G: 1 INJECTION, POWDER, FOR SOLUTION INTRAMUSCULAR; INTRAVENOUS at 18:18

## 2018-10-29 ASSESSMENT — PAIN SCALES - GENERAL: PAINLEVEL_OUTOF10: 0

## 2018-10-29 NOTE — PROGRESS NOTES
[facility]    Progress Note    10/29/2018    10:23 AM    Name:   Prachi Caal  MRN:     [id number]     [place]:      [de-identified]   Room:   2012/2012-02  IP Day:  5  Admit Date:  10/24/2018  8:10 AM    PCP:   Ofe Greenfield MD  Code Status:  Full Code    Subjective:     C/C:   Chief Complaint   Patient presents with    Fever    Emesis     Interval History Status:   Patient was gone for PICC line in morning and came back very late  Per Urology he can go home on IV antibiotics for 7 more days  Go home with [facility] catheter and follow with urology    Brief History:   The patient is a 61 y. o.   male who presents with Fever and Emesis   and he is admitted to the hospital for the management of  Nausea and vomiting and not feeling well. He had a cysto and biopsy for enlarged prostate back in august and has had indwelling ogden since then for urinary retention. Angelica Vera had ogden removed 2 days agon in the am but then had to have it replaced in the pm same day. Angelica Vera has had poor appetite/n/v/chills/shakes/fever.  Temperature 39.4 in ER.  Was hypotensive and looked to be in shock.  Has responded amazingly well to ivf. Review of Systems:     Constitutional:  negative for chills, fevers, sweats  Respiratory:  negative for cough, dyspnea on exertion, shortness of breath, wheezing  Cardiovascular:  negative for chest pain, chest pressure/discomfort, lower extremity edema, palpitations  Gastrointestinal:  negative for abdominal pain, constipation, diarrhea, nausea, vomiting  Neurological:  negative for dizziness, headache    Medications: Allergies:     Allergies   Allergen Reactions    Lisinopril Anaphylaxis     angioedema       Current Meds:   Scheduled Meds:    cetirizine  10 mg Oral Daily    fluticasone  2 spray Each Nare Daily    diltiazem  300 mg Oral Daily    cefTRIAXone (ROCEPHIN) IV  1 g Intravenous Q24H    sodium chloride flush  10 mL

## 2018-10-29 NOTE — PROGRESS NOTES
Pt. Has had PICC line placed in the  Right upper arm and  Prior peripheral IV was  Removed. Pt. Has  Been given handout  information on the PICC line care and use,  Whitmore care,   And rocephin.

## 2018-10-29 NOTE — PROGRESS NOTES
Margaret Cook                                                                                  Urology Progress Note            Subjective: Follow-up urinary retention neurogenic bladder    Patient Vitals for the past 24 hrs:   BP Temp Temp src Pulse Resp SpO2 Weight   10/29/18 0429 - - - - - - 256 lb 4.8 oz (116.3 kg)   10/29/18 0128 (!) 148/82 - - 69 - - -   10/29/18 0006 (!) 169/92 98.8 °F (37.1 °C) Oral 75 20 94 % -   10/28/18 2011 (!) 171/90 98.2 °F (36.8 °C) Oral 83 16 92 % -   10/28/18 0837 (!) 145/95 98.4 °F (36.9 °C) Oral 77 18 94 % -       Intake/Output Summary (Last 24 hours) at 10/29/18 0532  Last data filed at 10/29/18 0429   Gross per 24 hour   Intake                0 ml   Output             2075 ml   Net            -2075 ml       Recent Labs      10/26/18   0621  10/27/18   0708  10/28/18   0624   WBC  7.1  6.4  7.7   HGB  10.9*  10.8*  11.3*   HCT  32.4*  32.2*  33.8*   MCV  80.0  80.1  79.6*   PLT  156  142  190     Recent Labs      10/26/18   0621  10/27/18   0708  10/28/18   0624   NA  138  137  141   K  3.5*  3.8  3.7   CL  104  104  104   CO2  23  23  24   BUN  17  16  15   CREATININE  1.07  0.86  0.90       No results for input(s): COLORU, PHUR, LABCAST, WBCUA, RBCUA, MUCUS, TRICHOMONAS, YEAST, BACTERIA, CLARITYU, SPECGRAV, LEUKOCYTESUR, UROBILINOGEN, BILIRUBINUR, BLOODU in the last 72 hours.     Invalid input(s): NITRATE, GLUCOSEUKETONESUAMORPHOUS    Additional Lab/culture results:    Physical Exam: Patient alert oriented not in distress    Interval Imaging Findings:    Impression:    Patient Active Problem List   Diagnosis    Urinary retention    Elevated PSA    Hyperkalemia    Hypertension    Renal failure    Obstructive uropathy    Hydronephrosis    SVT (supraventricular tachycardia) ([facility])    Hypertrophy of prostate with urinary retention    Incomplete RBBB    Type 2 diabetes mellitus (H[facility])    Urine retention    PSA elevation    Severe

## 2018-10-29 NOTE — DISCHARGE SUMMARY
[facility]    Discharge Summary     Patient ID: Dutch Bhatti  :  1955   MRN: [id number]     ACCOUNT:  [de-identified]   Patient's PCP: Jody Waite MD  Admit Date: 10/24/2018   Discharge Date: 10/29/2018     Length of Stay: 5  Code Status:  Full Code  Admitting Physician: Jeff Parson, DO  Discharge Physician: Iraida Timmons MD     Active Discharge Diagnoses:     Hospital Problem Lists:  Principal Problem:    Severe sepsis ([address]  Active Problems:    Urinary retention    Hypertension    Obstructive uropathy    CKD (chronic kidney disease) stage 3, GFR 30-59 ml/min ([facility])    E. coli sepsis ([address]    Urinary tract infection associated with indwelling urethral catheter ([address]    Septicemia ([address]  Resolved Problems:    * No resolved hospital problems. *      Admission Condition:  poor     Discharged Condition: good    Hospital Stay:   Admitting history:  The patient is a 61 y. o.   male who presents with Fever and Emesis   and he is admitted to the hospital for the management of  Nausea and vomiting and not feeling well. He had a cysto and biopsy for enlarged prostate back in august and has had indwelling ogden since then for urinary retention. [facility] had ogden removed 2 days agon in the am but then had to have it replaced in the pm same day. [facility] has had poor appetite/n/v/chills/shakes/fever.  Temperature 39.4 in ER.  Was hypotensive and looked to be in shock.  Has responded amazingly well to ivf.     Hospital Course:  Improved with IV ABX therapy  Needs to stay on IV ABX at home also  Patient was gone for PICC line in morning and came back very late  Per Urology he can go home on IV antibiotics for 7 more days  Go home with [facility] catheter and follow with urology  Primary Problem  Severe sepsis [facility])          Active Hospital Problems     Diagnosis Date Noted    Septicemia ([address] [A41.9]      E. coli sepsis ([address] [A41.51] 10/25/2018    Urinary tract infection associated with indwelling urethral catheter ([facility] [T83.511A, N39.0] 10/25/2018    Severe sepsis ([facility] [A41.9, R65.20] 10/24/2018    CKD (chronic kidney disease) stage 3, GFR 30-59 ml/min ([facility]) [N18.3] 10/24/2018    Obstructive uropathy [N13.9] 08/19/2018    Hypertension [I10] 08/18/2018    Urinary retention [R33.9] 08/18/2018         Plan:         1. Discharge home on IV antibiotic for 7 more days  2. Keep  Foleys in until  seen by urology  3.  Discharge home with home care       Significant therapeutic interventions: IV antibiotics    Significant Diagnostic Studies:   Labs / Micro:  CBC:   Lab Results   Component Value Date    WBC 8.9 10/29/2018    RBC 4.29 10/29/2018    HGB 11.4 10/29/2018    HCT 33.7 10/29/2018    MCV 78.6 10/29/2018    MCH 26.5 10/29/2018    MCHC 33.7 10/29/2018    RDW 15.3 10/29/2018     10/29/2018     BMP:    Lab Results   Component Value Date    GLUCOSE 120 10/29/2018     10/29/2018    K 3.7 10/29/2018     10/29/2018    CO2 22 10/29/2018    ANIONGAP 14 10/29/2018    BUN 16 10/29/2018    CREATININE 0.92 10/29/2018    BUNCRER 17 10/29/2018    CALCIUM 8.4 10/29/2018    LABGLOM >60 10/29/2018    GFRAA >60 10/29/2018    GFR      10/29/2018    GFR NOT REPORTED 10/29/2018     HFP:    Lab Results   Component Value Date    PROT 7.7 08/18/2018     CMP:    Lab Results   Component Value Date    GLUCOSE 120 10/29/2018     10/29/2018    K 3.7 10/29/2018     10/29/2018    CO2 22 10/29/2018    BUN 16 10/29/2018    CREATININE 0.92 10/29/2018    ANIONGAP 14 10/29/2018    ALKPHOS 248 08/18/2018     08/18/2018    AST 30 08/18/2018    BILITOT 0.66 08/18/2018    LABALBU 4.3 08/18/2018    ALBUMIN NOT REPORTED 08/18/2018    LABGLOM >60 10/29/2018    GFRAA >60 10/29/2018    GFR      10/29/2018    GFR NOT REPORTED 10/29/2018    PROT 7.7 08/18/2018    CALCIUM 8.4 10/29/2018     PT/INR:  No results found for: PTINR  PTT: No results found for: APTT  FLP:  No

## 2018-10-29 NOTE — PLAN OF CARE
Problem: Falls - Risk of:  Goal: Will remain free from falls  Will remain free from falls   Outcome: Ongoing    Goal: Absence of physical injury  Absence of physical injury   Outcome: Met This Shift      Problem: Skin Integrity:  Goal: Will show no infection signs and symptoms  Will show no infection signs and symptoms   Outcome: Met This Shift    Goal: Absence of new skin breakdown  Absence of new skin breakdown   Outcome: Met This Shift      Problem: Infection, Septic Shock:  Goal: Will show no infection signs and symptoms  Will show no infection signs and symptoms   Outcome: Met This Shift

## 2018-11-01 LAB
CULTURE: NORMAL
CULTURE: NORMAL
Lab: NORMAL
Lab: NORMAL
SPECIMEN DESCRIPTION: NORMAL
SPECIMEN DESCRIPTION: NORMAL
STATUS: NORMAL
STATUS: NORMAL

## 2018-11-06 ENCOUNTER — HOSPITAL ENCOUNTER (OUTPATIENT)
Age: 63
Setting detail: OUTPATIENT SURGERY
Discharge: HOME | End: 2018-11-06
Attending: UROLOGY | Admitting: UROLOGY

## 2018-11-06 VITALS
WEIGHT: 253.75 LBS | TEMPERATURE: 97.7 F | SYSTOLIC BLOOD PRESSURE: 168 MMHG | RESPIRATION RATE: 14 BRPM | HEART RATE: 70 BPM | DIASTOLIC BLOOD PRESSURE: 88 MMHG | BODY MASS INDEX: 34.37 KG/M2 | OXYGEN SATURATION: 94 % | HEIGHT: 72 IN

## 2018-11-06 LAB
-: ABNORMAL
AMORPHOUS: ABNORMAL
BACTERIA: ABNORMAL
BILIRUBIN URINE: NEGATIVE
CASTS UA: ABNORMAL /LPF
COLOR: YELLOW
COMMENT UA: ABNORMAL
CRYSTALS, UA: ABNORMAL /HPF
EPITHELIAL CELLS UA: ABNORMAL /HPF (ref 0–5)
GLUCOSE URINE: NEGATIVE
KETONES, URINE: NEGATIVE
LEUKOCYTE ESTERASE, URINE: NEGATIVE
MUCUS: ABNORMAL
NITRITE, URINE: NEGATIVE
OTHER OBSERVATIONS UA: ABNORMAL
PH UA: 7 (ref 5–8)
PROTEIN UA: NEGATIVE
RBC UA: ABNORMAL /HPF (ref 0–2)
RENAL EPITHELIAL, UA: ABNORMAL /HPF
SPECIFIC GRAVITY UA: 1 (ref 1–1.03)
TRICHOMONAS: ABNORMAL
TURBIDITY: ABNORMAL
URINE HGB: ABNORMAL
UROBILINOGEN, URINE: NORMAL
WBC UA: ABNORMAL /HPF (ref 0–5)
YEAST: ABNORMAL

## 2018-11-06 PROCEDURE — 81001 URINALYSIS AUTO W/SCOPE: CPT

## 2018-11-06 PROCEDURE — 7100000011 HC PHASE II RECOVERY - ADDTL 15 MIN: Performed by: UROLOGY

## 2018-11-06 PROCEDURE — 7100000010 HC PHASE II RECOVERY - FIRST 15 MIN: Performed by: UROLOGY

## 2018-11-06 PROCEDURE — 6370000000 HC RX 637 (ALT 250 FOR IP): Performed by: UROLOGY

## 2018-11-06 PROCEDURE — 2709999900 HC NON-CHARGEABLE SUPPLY: Performed by: UROLOGY

## 2018-11-06 PROCEDURE — 3600000012 HC SURGERY LEVEL 2 ADDTL 15MIN: Performed by: UROLOGY

## 2018-11-06 PROCEDURE — 3600000002 HC SURGERY LEVEL 2 BASE: Performed by: UROLOGY

## 2018-11-06 RX ORDER — TAMSULOSIN HYDROCHLORIDE 0.4 MG/1
0.4 CAPSULE ORAL DAILY
Qty: 30 CAPSULE | Refills: 1 | Status: SHIPPED | OUTPATIENT
Start: 2018-11-06 | End: 2018-12-18

## 2018-11-06 RX ORDER — CEPHALEXIN 500 MG/1
500 CAPSULE ORAL 3 TIMES DAILY
Qty: 15 CAPSULE | Refills: 0 | Status: SHIPPED | OUTPATIENT
Start: 2018-11-06 | End: 2018-11-11

## 2018-11-06 ASSESSMENT — PAIN - FUNCTIONAL ASSESSMENT: PAIN_FUNCTIONAL_ASSESSMENT: 0-10

## 2018-11-06 NOTE — OP NOTE
[address] [facility]    Operative Note    Vera Adhikari  YOB: 1955  [id number]      Pre-operative Diagnosis: Urinary retention    Post-operative Diagnosis: Same, with neurogenic bladder, prostate hypertrophy bladder outlet obstruction    Procedure: Cystoscopy catheter insertion    Anesthesia: Local    Surgeons/Assistants: Dr Jan Tyson    Estimated Blood Loss: None    Complications: None    Specimens: Was Obtained: Urine    Indications: 80-year-old male with large bladder capacity failed trial of voiding with acute reflux obstructive uropathy with bilateral hydronephrosis and acute kidney injury that resolved after Whitmore catheter insertion the patient had a urinary tract infection requiring intravenous antibiotic therapy presently completed the IV course, he still has a PICC line which will be removed if the repeat culture is negative    Operative Findings: The patient was brought to the operating room positioned in supine prepping and draping patient identification were completed. We entered the bladder with the cystoscope, urethroscopy and cystoscopy demonstrated lateral lobe hypertrophy of the prostate with visual occlusion of the bladder outlet. The interior of the bladder was found to be markedly trabeculated with chronic catheter cystitis changes there were no tumors ulcers or stones. The ureteral orifices effluxing clear urine. At the completion of the cystoscopy the catheter was reinserted and the patient returned to recovery room in stable condition. Recommendations we are awaiting urine culture results to decide on antibiotic therapy in the interim the patient will be on Keflex for the next 3 days. Intermittent self-catheterization. Would possibly be an option, the patient is willing to try.     Another possibility would be consideration for greenlight laser prostatectomy  and possible TUR prostate prior to deciding on a suprapubic catheter    Electronically signed by Ronald Young MD on 11/6/2018 at 11:25 AM

## 2018-11-30 ENCOUNTER — OFFICE VISIT (OUTPATIENT)
Dept: UROLOGY | Age: 63
End: 2018-11-30

## 2018-11-30 ENCOUNTER — HOSPITAL ENCOUNTER (OUTPATIENT)
Age: 63
Setting detail: SPECIMEN
Discharge: HOME | End: 2018-11-30

## 2018-11-30 VITALS
HEIGHT: 72 IN | DIASTOLIC BLOOD PRESSURE: 99 MMHG | BODY MASS INDEX: 34.48 KG/M2 | OXYGEN SATURATION: 20 % | WEIGHT: 254.6 LBS | HEART RATE: 76 BPM | SYSTOLIC BLOOD PRESSURE: 160 MMHG

## 2018-11-30 DIAGNOSIS — R97.20 ELEVATED PSA: ICD-10-CM

## 2018-11-30 DIAGNOSIS — R33.9 URINARY RETENTION: ICD-10-CM

## 2018-11-30 DIAGNOSIS — R33.9 URINARY RETENTION: Primary | ICD-10-CM

## 2018-11-30 PROCEDURE — 99204 OFFICE O/P NEW MOD 45 MIN: CPT | Performed by: UROLOGY

## 2018-11-30 PROCEDURE — 99202 OFFICE O/P NEW SF 15 MIN: CPT | Performed by: UROLOGY

## 2018-11-30 NOTE — PROGRESS NOTES
[address]  [address]  [address]  [address] [place] [address]  Dept: [phone]  Dept Fax: [phone] [address], [address] [place] [address]  Urology Office Note - New Patient    Patient:  Carmelo Harding  YOB: 1955  Date: 12/2/2018    The patient is a 61 y.o. male who presents today for evaluation of the following problems:   Chief Complaint   Patient presents with    New Patient     BPH-    referred/consultation requested by Loyd Jacobs MD.      HISTORY OF PRESENT ILLNESS:     Onset was  Months ago  Overall, the problem(s) are worse. Severity is described as severe. Associated Symptoms: No dysuria, no gross hematuria. Current Pain Severity: 0      Hx of urinary retention, renal failure  Had neg prostate biopsy and cystoscopy by Dr Airam Harvey  Here with indwelling ogden to discuss greenlight surgery        Requested/reviewed records from Loyd Jacobs MD office and/or outside physician/EMR    (Patient's old records have been requested, reviewed and pertinent findings summarized in today's note.)    Last several PSA's:  Lab Results   Component Value Date    PSA 9.11 (H) 10/19/2018    PSA 28.03 (H) 08/29/2018    .80 (H) 08/21/2018       Last total testosterone:  Lab Results   Component Value Date    TESTOSTERONE 292 08/29/2018       Urinalysis today:  No results found for this visit on 11/30/18. Last BUN and creatinine:  Lab Results   Component Value Date    BUN 16 10/29/2018     Lab Results   Component Value Date    CREATININE 0.92 10/29/2018       Additional Lab/Culture results: none    Imaging Reviewed during this Office Visit:   Joseph Hercules MD independently reviewed the images and verified the radiology reports from:    No results found.     PAST MEDICAL, FAMILY AND SOCIAL HISTORY:  Past Medical History:   Diagnosis Date    Elevated PSA     Hypertension     Kidney stone     Seasonal allergies     Sepsis ([address]

## 2018-12-02 LAB
CULTURE: ABNORMAL
CULTURE: ABNORMAL
Lab: ABNORMAL
ORGANISM: ABNORMAL
ORGANISM: ABNORMAL
SPECIMEN DESCRIPTION: ABNORMAL
STATUS: ABNORMAL

## 2018-12-04 ENCOUNTER — TELEPHONE (OUTPATIENT)
Dept: UROLOGY | Age: 63
End: 2018-12-04

## 2018-12-12 DIAGNOSIS — T83.511A URINARY TRACT INFECTION ASSOCIATED WITH INDWELLING URETHRAL CATHETER, INITIAL ENCOUNTER (HCC): Primary | ICD-10-CM

## 2018-12-12 DIAGNOSIS — N39.0 URINARY TRACT INFECTION ASSOCIATED WITH INDWELLING URETHRAL CATHETER, INITIAL ENCOUNTER (HCC): Primary | ICD-10-CM

## 2018-12-12 RX ORDER — CIPROFLOXACIN 500 MG/1
500 TABLET, FILM COATED ORAL 2 TIMES DAILY
Qty: 20 TABLET | Refills: 0 | Status: SHIPPED | OUTPATIENT
Start: 2018-12-12 | End: 2018-12-22

## 2018-12-19 ENCOUNTER — ANESTHESIA EVENT (OUTPATIENT)
Dept: OPERATING ROOM | Age: 63
End: 2018-12-19

## 2018-12-20 ENCOUNTER — ANESTHESIA (OUTPATIENT)
Dept: OPERATING ROOM | Age: 63
End: 2018-12-20

## 2018-12-20 ENCOUNTER — APPOINTMENT (OUTPATIENT)
Dept: GENERAL RADIOLOGY | Age: 63
End: 2018-12-20
Attending: UROLOGY

## 2018-12-20 ENCOUNTER — HOSPITAL ENCOUNTER (OUTPATIENT)
Age: 63
Setting detail: OUTPATIENT SURGERY
Discharge: HOME | End: 2018-12-20
Attending: UROLOGY | Admitting: UROLOGY

## 2018-12-20 VITALS
WEIGHT: 254 LBS | HEIGHT: 72 IN | HEART RATE: 73 BPM | OXYGEN SATURATION: 95 % | SYSTOLIC BLOOD PRESSURE: 172 MMHG | RESPIRATION RATE: 18 BRPM | DIASTOLIC BLOOD PRESSURE: 103 MMHG | BODY MASS INDEX: 34.4 KG/M2 | TEMPERATURE: 97.3 F

## 2018-12-20 VITALS — SYSTOLIC BLOOD PRESSURE: 102 MMHG | DIASTOLIC BLOOD PRESSURE: 63 MMHG | OXYGEN SATURATION: 94 % | TEMPERATURE: 95.4 F

## 2018-12-20 DIAGNOSIS — R33.9 URINARY RETENTION: Primary | ICD-10-CM

## 2018-12-20 LAB
GFR NON-AFRICAN AMERICAN: >60 ML/MIN
GFR SERPL CREATININE-BSD FRML MDRD: >60 ML/MIN
GFR SERPL CREATININE-BSD FRML MDRD: NORMAL ML/MIN/{1.73_M2}
GLUCOSE BLD-MCNC: 152 MG/DL (ref 74–100)
GLUCOSE BLD-MCNC: 180 MG/DL (ref 75–110)
POC CHLORIDE: 109 MMOL/L (ref 98–107)
POC CREATININE: 1.06 MG/DL (ref 0.51–1.19)
POC IONIZED CALCIUM: 1.23 MMOL/L (ref 1.15–1.33)
POC POTASSIUM: 3.9 MMOL/L (ref 3.5–4.5)
POC SODIUM: 145 MMOL/L (ref 138–146)

## 2018-12-20 PROCEDURE — 3700000000 HC ANESTHESIA ATTENDED CARE: Performed by: UROLOGY

## 2018-12-20 PROCEDURE — C1894 INTRO/SHEATH, NON-LASER: HCPCS | Performed by: UROLOGY

## 2018-12-20 PROCEDURE — 2500000003 HC RX 250 WO HCPCS: Performed by: UROLOGY

## 2018-12-20 PROCEDURE — 6360000002 HC RX W HCPCS

## 2018-12-20 PROCEDURE — 88305 TISSUE EXAM BY PATHOLOGIST: CPT

## 2018-12-20 PROCEDURE — 2709999900 HC NON-CHARGEABLE SUPPLY: Performed by: UROLOGY

## 2018-12-20 PROCEDURE — 3600000004 HC SURGERY LEVEL 4 BASE: Performed by: UROLOGY

## 2018-12-20 PROCEDURE — 2580000003 HC RX 258: Performed by: ANESTHESIOLOGY

## 2018-12-20 PROCEDURE — 3700000001 HC ADD 15 MINUTES (ANESTHESIA): Performed by: UROLOGY

## 2018-12-20 PROCEDURE — 7100000041 HC SPAR PHASE II RECOVERY - ADDTL 15 MIN: Performed by: UROLOGY

## 2018-12-20 PROCEDURE — 2500000003 HC RX 250 WO HCPCS: Performed by: NURSE ANESTHETIST, CERTIFIED REGISTERED

## 2018-12-20 PROCEDURE — 7100000040 HC SPAR PHASE II RECOVERY - FIRST 15 MIN: Performed by: UROLOGY

## 2018-12-20 PROCEDURE — 2580000003 HC RX 258: Performed by: UROLOGY

## 2018-12-20 PROCEDURE — 82947 ASSAY GLUCOSE BLOOD QUANT: CPT

## 2018-12-20 PROCEDURE — 84295 ASSAY OF SERUM SODIUM: CPT

## 2018-12-20 PROCEDURE — 82435 ASSAY OF BLOOD CHLORIDE: CPT

## 2018-12-20 PROCEDURE — 82565 ASSAY OF CREATININE: CPT

## 2018-12-20 PROCEDURE — 3600000014 HC SURGERY LEVEL 4 ADDTL 15MIN: Performed by: UROLOGY

## 2018-12-20 PROCEDURE — 6370000000 HC RX 637 (ALT 250 FOR IP): Performed by: UROLOGY

## 2018-12-20 PROCEDURE — 84132 ASSAY OF SERUM POTASSIUM: CPT

## 2018-12-20 PROCEDURE — 6360000002 HC RX W HCPCS: Performed by: NURSE ANESTHETIST, CERTIFIED REGISTERED

## 2018-12-20 PROCEDURE — 6360000002 HC RX W HCPCS: Performed by: STUDENT IN AN ORGANIZED HEALTH CARE EDUCATION/TRAINING PROGRAM

## 2018-12-20 PROCEDURE — 82330 ASSAY OF CALCIUM: CPT

## 2018-12-20 PROCEDURE — 2720000010 HC SURG SUPPLY STERILE: Performed by: UROLOGY

## 2018-12-20 PROCEDURE — 7100000001 HC PACU RECOVERY - ADDTL 15 MIN: Performed by: UROLOGY

## 2018-12-20 PROCEDURE — 7100000000 HC PACU RECOVERY - FIRST 15 MIN: Performed by: UROLOGY

## 2018-12-20 PROCEDURE — 87086 URINE CULTURE/COLONY COUNT: CPT

## 2018-12-20 RX ORDER — SODIUM CHLORIDE, SODIUM LACTATE, POTASSIUM CHLORIDE, CALCIUM CHLORIDE 600; 310; 30; 20 MG/100ML; MG/100ML; MG/100ML; MG/100ML
INJECTION, SOLUTION INTRAVENOUS CONTINUOUS
Status: DISCONTINUED | OUTPATIENT
Start: 2018-12-20 | End: 2018-12-20 | Stop reason: HOSPADM

## 2018-12-20 RX ORDER — OXYCODONE HYDROCHLORIDE AND ACETAMINOPHEN 5; 325 MG/1; MG/1
1 TABLET ORAL EVERY 6 HOURS PRN
Qty: 20 TABLET | Refills: 0 | Status: SHIPPED | OUTPATIENT
Start: 2018-12-20 | End: 2018-12-25

## 2018-12-20 RX ORDER — DOCUSATE SODIUM 100 MG/1
100 CAPSULE, LIQUID FILLED ORAL 2 TIMES DAILY
Qty: 30 CAPSULE | Refills: 0 | Status: SHIPPED | OUTPATIENT
Start: 2018-12-20 | End: 2019-01-19

## 2018-12-20 RX ORDER — ULTRASOUND COUPLING MEDIUM
GEL (GRAM) TOPICAL PRN
Status: DISCONTINUED | OUTPATIENT
Start: 2018-12-20 | End: 2018-12-20 | Stop reason: HOSPADM

## 2018-12-20 RX ORDER — SODIUM CHLORIDE 0.9 % (FLUSH) 0.9 %
10 SYRINGE (ML) INJECTION EVERY 12 HOURS SCHEDULED
Status: DISCONTINUED | OUTPATIENT
Start: 2018-12-20 | End: 2018-12-20 | Stop reason: HOSPADM

## 2018-12-20 RX ORDER — ONDANSETRON 2 MG/ML
INJECTION INTRAMUSCULAR; INTRAVENOUS PRN
Status: DISCONTINUED | OUTPATIENT
Start: 2018-12-20 | End: 2018-12-20 | Stop reason: SDUPTHER

## 2018-12-20 RX ORDER — MAGNESIUM HYDROXIDE 1200 MG/15ML
LIQUID ORAL CONTINUOUS PRN
Status: DISCONTINUED | OUTPATIENT
Start: 2018-12-20 | End: 2018-12-20 | Stop reason: HOSPADM

## 2018-12-20 RX ORDER — FENTANYL CITRATE 50 UG/ML
INJECTION, SOLUTION INTRAMUSCULAR; INTRAVENOUS PRN
Status: DISCONTINUED | OUTPATIENT
Start: 2018-12-20 | End: 2018-12-20 | Stop reason: SDUPTHER

## 2018-12-20 RX ORDER — SODIUM CHLORIDE 9 MG/ML
INJECTION, SOLUTION INTRAVENOUS CONTINUOUS
Status: DISCONTINUED | OUTPATIENT
Start: 2018-12-20 | End: 2018-12-20 | Stop reason: HOSPADM

## 2018-12-20 RX ORDER — DEXAMETHASONE SODIUM PHOSPHATE 10 MG/ML
INJECTION INTRAMUSCULAR; INTRAVENOUS PRN
Status: DISCONTINUED | OUTPATIENT
Start: 2018-12-20 | End: 2018-12-20 | Stop reason: SDUPTHER

## 2018-12-20 RX ORDER — LIDOCAINE HYDROCHLORIDE 10 MG/ML
INJECTION, SOLUTION EPIDURAL; INFILTRATION; INTRACAUDAL; PERINEURAL PRN
Status: DISCONTINUED | OUTPATIENT
Start: 2018-12-20 | End: 2018-12-20 | Stop reason: SDUPTHER

## 2018-12-20 RX ORDER — CIPROFLOXACIN 2 MG/ML
400 INJECTION, SOLUTION INTRAVENOUS ONCE
Status: COMPLETED | OUTPATIENT
Start: 2018-12-20 | End: 2018-12-20

## 2018-12-20 RX ORDER — PROPOFOL 10 MG/ML
INJECTION, EMULSION INTRAVENOUS CONTINUOUS PRN
Status: DISCONTINUED | OUTPATIENT
Start: 2018-12-20 | End: 2018-12-20 | Stop reason: SDUPTHER

## 2018-12-20 RX ORDER — SODIUM CHLORIDE 0.9 % (FLUSH) 0.9 %
10 SYRINGE (ML) INJECTION PRN
Status: DISCONTINUED | OUTPATIENT
Start: 2018-12-20 | End: 2018-12-20 | Stop reason: HOSPADM

## 2018-12-20 RX ORDER — MAGNESIUM SULFATE 1 G/100ML
INJECTION INTRAVENOUS PRN
Status: DISCONTINUED | OUTPATIENT
Start: 2018-12-20 | End: 2018-12-20 | Stop reason: SDUPTHER

## 2018-12-20 RX ORDER — PROPOFOL 10 MG/ML
INJECTION, EMULSION INTRAVENOUS PRN
Status: DISCONTINUED | OUTPATIENT
Start: 2018-12-20 | End: 2018-12-20 | Stop reason: SDUPTHER

## 2018-12-20 RX ORDER — WATER 1000 ML/1000ML
INJECTION, SOLUTION INTRAVENOUS PRN
Status: DISCONTINUED | OUTPATIENT
Start: 2018-12-20 | End: 2018-12-20 | Stop reason: HOSPADM

## 2018-12-20 RX ADMIN — CIPROFLOXACIN 400 MG: 2 INJECTION, SOLUTION INTRAVENOUS at 08:15

## 2018-12-20 RX ADMIN — PROPOFOL 200 MG: 10 INJECTION, EMULSION INTRAVENOUS at 08:03

## 2018-12-20 RX ADMIN — FENTANYL CITRATE 100 MCG: 50 INJECTION INTRAMUSCULAR; INTRAVENOUS at 08:03

## 2018-12-20 RX ADMIN — PHENYLEPHRINE HYDROCHLORIDE 200 MCG: 10 INJECTION INTRAVENOUS at 08:30

## 2018-12-20 RX ADMIN — SODIUM CHLORIDE, POTASSIUM CHLORIDE, SODIUM LACTATE AND CALCIUM CHLORIDE: 600; 310; 30; 20 INJECTION, SOLUTION INTRAVENOUS at 07:42

## 2018-12-20 RX ADMIN — PROPOFOL 50 MG: 10 INJECTION, EMULSION INTRAVENOUS at 08:50

## 2018-12-20 RX ADMIN — SODIUM CHLORIDE, POTASSIUM CHLORIDE, SODIUM LACTATE AND CALCIUM CHLORIDE: 600; 310; 30; 20 INJECTION, SOLUTION INTRAVENOUS at 08:40

## 2018-12-20 RX ADMIN — LIDOCAINE HYDROCHLORIDE 50 MG: 10 INJECTION, SOLUTION EPIDURAL; INFILTRATION; INTRACAUDAL; PERINEURAL at 08:03

## 2018-12-20 RX ADMIN — PHENYLEPHRINE HYDROCHLORIDE 100 MCG: 10 INJECTION INTRAVENOUS at 08:20

## 2018-12-20 RX ADMIN — MAGNESIUM SULFATE HEPTAHYDRATE 1 G: 1 INJECTION, SOLUTION INTRAVENOUS at 08:28

## 2018-12-20 RX ADMIN — PHENYLEPHRINE HYDROCHLORIDE 200 MCG: 10 INJECTION INTRAVENOUS at 08:25

## 2018-12-20 RX ADMIN — ONDANSETRON 4 MG: 2 INJECTION, SOLUTION INTRAMUSCULAR; INTRAVENOUS at 08:03

## 2018-12-20 RX ADMIN — PROPOFOL 250 MCG/KG/MIN: 10 INJECTION, EMULSION INTRAVENOUS at 08:04

## 2018-12-20 RX ADMIN — DEXAMETHASONE SODIUM PHOSPHATE 10 MG: 10 INJECTION INTRAMUSCULAR; INTRAVENOUS at 08:03

## 2018-12-20 ASSESSMENT — PULMONARY FUNCTION TESTS
PIF_VALUE: 2
PIF_VALUE: 26
PIF_VALUE: 0
PIF_VALUE: 26
PIF_VALUE: 17
PIF_VALUE: 16
PIF_VALUE: 17
PIF_VALUE: 15
PIF_VALUE: 17
PIF_VALUE: 17
PIF_VALUE: 26
PIF_VALUE: 17
PIF_VALUE: 26
PIF_VALUE: 17
PIF_VALUE: 17
PIF_VALUE: 27
PIF_VALUE: 26
PIF_VALUE: 18
PIF_VALUE: 17
PIF_VALUE: 25
PIF_VALUE: 17
PIF_VALUE: 16
PIF_VALUE: 18
PIF_VALUE: 17
PIF_VALUE: 2
PIF_VALUE: 27
PIF_VALUE: 1
PIF_VALUE: 26
PIF_VALUE: 17
PIF_VALUE: 1
PIF_VALUE: 17
PIF_VALUE: 17
PIF_VALUE: 26
PIF_VALUE: 1
PIF_VALUE: 21
PIF_VALUE: 18
PIF_VALUE: 26
PIF_VALUE: 17
PIF_VALUE: 26
PIF_VALUE: 17
PIF_VALUE: 17
PIF_VALUE: 16
PIF_VALUE: 17
PIF_VALUE: 9
PIF_VALUE: 17
PIF_VALUE: 26
PIF_VALUE: 17
PIF_VALUE: 26
PIF_VALUE: 26
PIF_VALUE: 18
PIF_VALUE: 26
PIF_VALUE: 17
PIF_VALUE: 26
PIF_VALUE: 1
PIF_VALUE: 26
PIF_VALUE: 18
PIF_VALUE: 26
PIF_VALUE: 17
PIF_VALUE: 0
PIF_VALUE: 17
PIF_VALUE: 26
PIF_VALUE: 25
PIF_VALUE: 13
PIF_VALUE: 26
PIF_VALUE: 17
PIF_VALUE: 17
PIF_VALUE: 26
PIF_VALUE: 26
PIF_VALUE: 10
PIF_VALUE: 26
PIF_VALUE: 26
PIF_VALUE: 17
PIF_VALUE: 26
PIF_VALUE: 2
PIF_VALUE: 26
PIF_VALUE: 2
PIF_VALUE: 17
PIF_VALUE: 19
PIF_VALUE: 17
PIF_VALUE: 28
PIF_VALUE: 26
PIF_VALUE: 26
PIF_VALUE: 17
PIF_VALUE: 13
PIF_VALUE: 27
PIF_VALUE: 26
PIF_VALUE: 16
PIF_VALUE: 26
PIF_VALUE: 17
PIF_VALUE: 27
PIF_VALUE: 14
PIF_VALUE: 11
PIF_VALUE: 17
PIF_VALUE: 26
PIF_VALUE: 8
PIF_VALUE: 26
PIF_VALUE: 17
PIF_VALUE: 17
PIF_VALUE: 26
PIF_VALUE: 17
PIF_VALUE: 13
PIF_VALUE: 26
PIF_VALUE: 26
PIF_VALUE: 27
PIF_VALUE: 26
PIF_VALUE: 17
PIF_VALUE: 26
PIF_VALUE: 26
PIF_VALUE: 1
PIF_VALUE: 13
PIF_VALUE: 17

## 2018-12-20 ASSESSMENT — PAIN SCALES - GENERAL
PAINLEVEL_OUTOF10: 2
PAINLEVEL_OUTOF10: 2
PAINLEVEL_OUTOF10: 3
PAINLEVEL_OUTOF10: 2

## 2018-12-20 ASSESSMENT — PAIN DESCRIPTION - PAIN TYPE: TYPE: SURGICAL PAIN

## 2018-12-20 ASSESSMENT — PAIN - FUNCTIONAL ASSESSMENT: PAIN_FUNCTIONAL_ASSESSMENT: 0-10

## 2018-12-20 ASSESSMENT — PAIN DESCRIPTION - LOCATION: LOCATION: ABDOMEN

## 2018-12-20 NOTE — ANESTHESIA PRE PROCEDURE
BMI:   Wt Readings from Last 3 Encounters:   12/20/18 254 lb (115.2 kg)   11/30/18 254 lb 9.6 oz (115.5 kg)   11/06/18 253 lb 12 oz (115.1 kg)     There is no height or weight on file to calculate BMI.    CBC:   Lab Results   Component Value Date    WBC 8.9 10/29/2018    RBC 4.29 10/29/2018    HGB 11.4 10/29/2018    HCT 33.7 10/29/2018    MCV 78.6 10/29/2018    RDW 15.3 10/29/2018     10/29/2018       CMP:   Lab Results   Component Value Date     10/29/2018    K 3.7 10/29/2018     10/29/2018    CO2 22 10/29/2018    BUN 16 10/29/2018    CREATININE 0.92 10/29/2018    GFRAA >60 10/29/2018    LABGLOM >60 10/29/2018    GLUCOSE 120 10/29/2018    PROT 7.7 08/18/2018    CALCIUM 8.4 10/29/2018    BILITOT 0.66 08/18/2018    ALKPHOS 248 08/18/2018    AST 30 08/18/2018     08/18/2018       POC Tests: No results for input(s): POCGLU, POCNA, POCK, POCCL, POCBUN, POCHEMO, POCHCT in the last 72 hours.     Coags:   Lab Results   Component Value Date    PROTIME 12.3 08/18/2018    INR 1.2 08/18/2018       HCG (If Applicable): No results found for: PREGTESTUR, PREGSERUM, HCG, HCGQUANT     ABGs: No results found for: PHART, PO2ART, JOJ2LRJ, AYG5RVQ, BEART, C3KDHFEH     Type & Screen (If Applicable):  No results found for: LABABO, [address]    Anesthesia Evaluation  Patient summary reviewed and Nursing notes reviewed history of anesthetic complications:   Airway: Mallampati: II  TM distance: >3 FB   Neck ROM: full  Mouth opening: > = 3 FB Dental: normal exam         Pulmonary:normal exam  breath sounds clear to auscultation                             Cardiovascular:  Exercise tolerance: good (>4 METS),   (+) hypertension:,       ECG reviewed  Rhythm: regular  Rate: normal  Echocardiogram reviewed    Cleared by cardiology              Neuro/Psych:   Negative Neuro/Psych ROS              GI/Hepatic/Renal:             Endo/Other:    (+) DiabetesType II DM, , .                 Abdominal:       Abdomen:

## 2018-12-20 NOTE — OP NOTE
nylon and then clamped. We then called for the Greenlight fiber. The resection was started proximal with a power setting of 80W. We started by taking down the median lobe, which was quite large. We created grooves laterally and actually enucleated part of the median lobe. We took down the median lobe systematically to level it out with the trigone. Care was taken to avoid scatter to the trigone or ureteral orifices. Then, both the left and right lateral lobes were vaporized in their entirety down to the level of the capsule. With this complete, the apical dissection was carried out delicately. All of the obstructing adenoma was vaporized. Exquisite care was taken to ensure the integrity of the urinary sphincter. Once completed, the sphincter was evaluated and found to be clear of the resection bed, and completely intact. One last evaluation of the prostatic urethra demonstrated complete vaporization of the gland to the capsule circumferentially. The gland was very large and the resection was extensive, requiring over 1 hour of laser time. The scope was advanced into the bladder. The ureteral orifices were re-surveyed and noted to be in pristine condition free of laser scatter. The cystoscopy was then removed, and a 22F 3-way Whitmore catheter was placed into the bladder. When urine returned, the balloon was instilled with sterile water. The catheter was attached to gentle bladder irrigation using 0.9% normal saline. The catheter was fixed to the leg using a Colton strap. The patient was then awakened, extubated, and discharged back to the PACU in good and stable condition. Follow-Up: The bladder will be irrigated in the PACU.  As long as the urine remains clear, the irrigation port will be plugged and the patient will be discharged home with plans to follow up tomorrow in staff clinic for a voiding trial.    Dorota Win  Electronically signed on 12/20/2018 at 9:50 AM

## 2018-12-20 NOTE — H&P
I have examined the patient and reviewed the medical history. There are no changes to the medical history or physical exam.  The patient is here today for: cysto, Greenlight PVP, sp tube    Noe Olvera MD  PGY-3, Urology  7:35 AM 12/20/2018     . [phone]  [address]  [address] [facility] [address] [facility] [address]  Dept: [phone]  Dept Fax: [phone]     TYRONE Reilly MD  [address] Urology Office Note - New Patient     Patient:  Farzaneh Hoover  YOB: 1955  Date: 12/2/2018     The patient is a 61 y.o. male who presents today for evaluation of the following problems:        Chief Complaint   Patient presents with    New Patient       BPH-    referred/consultation requested by Cheng Leiva MD.        HISTORY OF PRESENT ILLNESS:      Onset was  Months ago  Overall, the problem(s) are worse. Severity is described as severe.   Associated Symptoms: No dysuria, no gross hematuria.   Current Pain Severity: 0        Hx of urinary retention, renal failure  Had neg prostate biopsy and cystoscopy by Dr Chantel Mulligan  Here with indwelling ogedn to discuss greenlight surgery           Requested/reviewed records from Cheng Leiva MD office and/or outside [address] [place] [address]  (Patient's old records have been requested, reviewed and pertinent findings summarized in today's note.)     Last several PSA's:        Lab Results   Component Value Date     PSA 9.11 (H) 10/19/2018     PSA 28.03 (H) 08/29/2018     .80 (H) 08/21/2018         Last total testosterone:        Lab Results   Component Value Date     TESTOSTERONE 292 08/29/2018         Urinalysis today:  No results found for this visit on 11/30/18.     Last BUN and creatinine:        Lab Results   Component Value Date     BUN 16 10/29/2018            Lab Results   Component Value Date     CREATININE 0.92 10/29/2018         Additional Lab/Culture results: none     Imaging Reviewed during this oriented to person place and time.    Psych: Mood and affect normal.  Skin: warm, dry  Lungs: Respiratory effort normal, CTA  Cardiovascular:  Normal peripheral pulses; Regular rate  Abdomen: Soft, non-tender, non-distended with no CVA, flank pain, hepatosplenomegaly or hernia.  Kidneys normal.  Bladder non-tender and not distended. Lymphatics: no palpable lymphadenopathy  Minimal/no edema in bilateral lower extremities  Whitmore in place      Assessment and Plan      1. Urinary retention    2.  Elevated PSA                Plan:      Cystoscopy greenlight with suprapubic catheter placement  Urine culture  Exchange catheter today        Prescriptions Ordered:    Encounter Medications    No orders of the defined types were placed in this encounter.      Orders Placed:        Orders Placed This Encounter   Procedures    Urine Culture       Standing Status:   Future       Number of Occurrences:   1       Standing Expiration Date:   11/30/2019       Order Specific Question:   Specify (ex-cath, midstream, cysto, etc)?       Answer:   from catheter            TYRONE Luna MD

## 2018-12-21 ENCOUNTER — NURSE ONLY (OUTPATIENT)
Dept: UROLOGY | Age: 63
End: 2018-12-21

## 2018-12-21 VITALS
HEART RATE: 92 BPM | SYSTOLIC BLOOD PRESSURE: 173 MMHG | HEIGHT: 72 IN | RESPIRATION RATE: 20 BRPM | DIASTOLIC BLOOD PRESSURE: 94 MMHG | BODY MASS INDEX: 35.21 KG/M2 | WEIGHT: 260 LBS

## 2018-12-21 DIAGNOSIS — N40.1 BENIGN LOCALIZED PROSTATIC HYPERPLASIA WITH LOWER URINARY TRACT SYMPTOMS (LUTS): Primary | ICD-10-CM

## 2018-12-21 DIAGNOSIS — R97.20 ELEVATED PSA: ICD-10-CM

## 2018-12-21 LAB — SURGICAL PATHOLOGY REPORT: NORMAL

## 2018-12-21 PROCEDURE — 99024 POSTOP FOLLOW-UP VISIT: CPT | Performed by: UROLOGY

## 2018-12-22 LAB
CULTURE: ABNORMAL
Lab: ABNORMAL
SPECIMEN DESCRIPTION: ABNORMAL
STATUS: ABNORMAL

## 2019-01-04 ENCOUNTER — OFFICE VISIT (OUTPATIENT)
Dept: UROLOGY | Age: 64
End: 2019-01-04

## 2019-01-04 VITALS
HEIGHT: 72 IN | WEIGHT: 254 LBS | DIASTOLIC BLOOD PRESSURE: 95 MMHG | BODY MASS INDEX: 34.4 KG/M2 | HEART RATE: 72 BPM | SYSTOLIC BLOOD PRESSURE: 162 MMHG

## 2019-01-04 DIAGNOSIS — R39.14 BENIGN PROSTATIC HYPERPLASIA WITH INCOMPLETE BLADDER EMPTYING: ICD-10-CM

## 2019-01-04 DIAGNOSIS — R33.9 RETENTION OF URINE: Primary | ICD-10-CM

## 2019-01-04 DIAGNOSIS — N40.1 BENIGN PROSTATIC HYPERPLASIA WITH INCOMPLETE BLADDER EMPTYING: ICD-10-CM

## 2019-01-04 PROCEDURE — 99024 POSTOP FOLLOW-UP VISIT: CPT | Performed by: UROLOGY

## 2019-01-04 PROCEDURE — 99212 OFFICE O/P EST SF 10 MIN: CPT | Performed by: UROLOGY

## 2019-01-18 ENCOUNTER — OFFICE VISIT (OUTPATIENT)
Dept: UROLOGY | Age: 64
End: 2019-01-18

## 2019-01-18 VITALS
DIASTOLIC BLOOD PRESSURE: 82 MMHG | BODY MASS INDEX: 35.67 KG/M2 | WEIGHT: 263 LBS | HEART RATE: 79 BPM | SYSTOLIC BLOOD PRESSURE: 142 MMHG

## 2019-01-18 DIAGNOSIS — R33.9 URINARY RETENTION: Primary | ICD-10-CM

## 2019-01-18 DIAGNOSIS — R97.20 ELEVATED PSA: ICD-10-CM

## 2019-01-18 PROCEDURE — 99024 POSTOP FOLLOW-UP VISIT: CPT | Performed by: UROLOGY

## 2019-02-04 ENCOUNTER — TELEPHONE (OUTPATIENT)
Dept: UROLOGY | Age: 64
End: 2019-02-04

## 2019-02-22 ENCOUNTER — OFFICE VISIT (OUTPATIENT)
Dept: UROLOGY | Age: 64
End: 2019-02-22

## 2019-02-22 VITALS
BODY MASS INDEX: 35.38 KG/M2 | HEART RATE: 73 BPM | DIASTOLIC BLOOD PRESSURE: 80 MMHG | SYSTOLIC BLOOD PRESSURE: 150 MMHG | WEIGHT: 261.2 LBS | HEIGHT: 72 IN

## 2019-02-22 DIAGNOSIS — N13.8 BENIGN PROSTATIC HYPERPLASIA WITH URINARY OBSTRUCTION: Primary | ICD-10-CM

## 2019-02-22 DIAGNOSIS — R33.9 URINARY RETENTION: ICD-10-CM

## 2019-02-22 DIAGNOSIS — N40.1 BENIGN PROSTATIC HYPERPLASIA WITH URINARY OBSTRUCTION: Primary | ICD-10-CM

## 2019-02-22 PROCEDURE — 99024 POSTOP FOLLOW-UP VISIT: CPT | Performed by: UROLOGY

## 2019-03-01 ENCOUNTER — OFFICE VISIT (OUTPATIENT)
Dept: UROLOGY | Age: 64
End: 2019-03-01

## 2019-03-01 VITALS
HEIGHT: 72 IN | DIASTOLIC BLOOD PRESSURE: 89 MMHG | HEART RATE: 59 BPM | WEIGHT: 258.2 LBS | BODY MASS INDEX: 34.97 KG/M2 | SYSTOLIC BLOOD PRESSURE: 146 MMHG

## 2019-03-01 DIAGNOSIS — R33.9 URINARY RETENTION: Primary | ICD-10-CM

## 2019-03-01 DIAGNOSIS — N40.1 BPH WITH OBSTRUCTION/LOWER URINARY TRACT SYMPTOMS: ICD-10-CM

## 2019-03-01 DIAGNOSIS — N13.8 BPH WITH OBSTRUCTION/LOWER URINARY TRACT SYMPTOMS: ICD-10-CM

## 2019-03-01 LAB
BILIRUBIN, POC: NORMAL
BLOOD URINE, POC: NORMAL
CLARITY, POC: NORMAL
COLOR, POC: YELLOW
GLUCOSE URINE, POC: NORMAL
KETONES, POC: NORMAL
LEUKOCYTE EST, POC: NORMAL
NITRITE, POC: NORMAL
PH, POC: 5.5
PROTEIN, POC: 100
SPECIFIC GRAVITY, POC: 1.03
UROBILINOGEN, POC: 0.2

## 2019-03-01 PROCEDURE — 51798 US URINE CAPACITY MEASURE: CPT | Performed by: UROLOGY

## 2019-03-01 PROCEDURE — 99212 OFFICE O/P EST SF 10 MIN: CPT | Performed by: UROLOGY

## 2019-03-01 PROCEDURE — 81002 URINALYSIS NONAUTO W/O SCOPE: CPT | Performed by: UROLOGY

## 2019-08-07 ENCOUNTER — HOSPITAL ENCOUNTER (INPATIENT)
Age: 64
LOS: 2 days | Discharge: HOME | DRG: 638 | End: 2019-08-09
Attending: EMERGENCY MEDICINE | Admitting: INTERNAL MEDICINE

## 2019-08-07 DIAGNOSIS — N20.0 KIDNEY STONE: ICD-10-CM

## 2019-08-07 DIAGNOSIS — N28.9 KIDNEY PROBLEM: ICD-10-CM

## 2019-08-07 DIAGNOSIS — N18.30 CKD (CHRONIC KIDNEY DISEASE) STAGE 3, GFR 30-59 ML/MIN (HCC): ICD-10-CM

## 2019-08-07 DIAGNOSIS — J30.2 SEASONAL ALLERGIES: ICD-10-CM

## 2019-08-07 DIAGNOSIS — E11.9 NEW ONSET TYPE 2 DIABETES MELLITUS (HCC): Primary | ICD-10-CM

## 2019-08-07 DIAGNOSIS — I10 ESSENTIAL HYPERTENSION: ICD-10-CM

## 2019-08-07 PROBLEM — R73.9 HYPERGLYCEMIA WITHOUT KETOSIS: Status: ACTIVE | Noted: 2019-08-07

## 2019-08-07 PROBLEM — E11.10 DIABETIC KETOACIDOSIS WITHOUT COMA ASSOCIATED WITH TYPE 2 DIABETES MELLITUS (HCC): Status: ACTIVE | Noted: 2019-08-07

## 2019-08-07 PROBLEM — N17.9 AKI (ACUTE KIDNEY INJURY) (HCC): Status: ACTIVE | Noted: 2019-08-07

## 2019-08-07 LAB
-: ABNORMAL
ABSOLUTE EOS #: 0.1 K/UL (ref 0–0.44)
ABSOLUTE IMMATURE GRANULOCYTE: 0.05 K/UL (ref 0–0.3)
ABSOLUTE LYMPH #: 1.09 K/UL (ref 1.1–3.7)
ABSOLUTE MONO #: 0.84 K/UL (ref 0.1–1.2)
AMORPHOUS: ABNORMAL
ANION GAP SERPL CALCULATED.3IONS-SCNC: 18 MMOL/L (ref 9–17)
ANION GAP SERPL CALCULATED.3IONS-SCNC: 22 MMOL/L (ref 9–17)
BACTERIA: ABNORMAL
BASOPHILS # BLD: 1 % (ref 0–2)
BASOPHILS ABSOLUTE: 0.05 K/UL (ref 0–0.2)
BETA-HYDROXYBUTYRATE: 3.53 MMOL/L (ref 0.02–0.27)
BILIRUBIN URINE: NEGATIVE
BUN BLDV-MCNC: 48 MG/DL (ref 8–23)
BUN BLDV-MCNC: 53 MG/DL (ref 8–23)
BUN/CREAT BLD: 24 (ref 9–20)
BUN/CREAT BLD: 25 (ref 9–20)
CALCIUM SERPL-MCNC: 9 MG/DL (ref 8.6–10.4)
CALCIUM SERPL-MCNC: 9.4 MG/DL (ref 8.6–10.4)
CASTS UA: ABNORMAL /LPF
CHLORIDE BLD-SCNC: 87 MMOL/L (ref 98–107)
CHLORIDE BLD-SCNC: 98 MMOL/L (ref 98–107)
CHP ED QC CHECK: NORMAL
CO2: 17 MMOL/L (ref 20–31)
CO2: 19 MMOL/L (ref 20–31)
COLOR: YELLOW
COMMENT UA: ABNORMAL
CREAT SERPL-MCNC: 1.96 MG/DL (ref 0.7–1.2)
CREAT SERPL-MCNC: 2.11 MG/DL (ref 0.7–1.2)
CRYSTALS, UA: ABNORMAL /HPF
DIFFERENTIAL TYPE: ABNORMAL
EOSINOPHILS RELATIVE PERCENT: 2 % (ref 1–4)
EPITHELIAL CELLS UA: ABNORMAL /HPF (ref 0–5)
FIO2: 21
GFR AFRICAN AMERICAN: 39 ML/MIN
GFR AFRICAN AMERICAN: 42 ML/MIN
GFR NON-AFRICAN AMERICAN: 32 ML/MIN
GFR NON-AFRICAN AMERICAN: 35 ML/MIN
GFR SERPL CREATININE-BSD FRML MDRD: ABNORMAL ML/MIN/{1.73_M2}
GLUCOSE BLD-MCNC: 253 MG/DL (ref 75–110)
GLUCOSE BLD-MCNC: 330 MG/DL (ref 75–110)
GLUCOSE BLD-MCNC: 429 MG/DL (ref 70–99)
GLUCOSE BLD-MCNC: 432 MG/DL (ref 75–110)
GLUCOSE BLD-MCNC: 481 MG/DL (ref 75–110)
GLUCOSE BLD-MCNC: 495 MG/DL (ref 75–110)
GLUCOSE BLD-MCNC: 497 MG/DL (ref 75–110)
GLUCOSE BLD-MCNC: 569 MG/DL (ref 75–110)
GLUCOSE BLD-MCNC: 885 MG/DL (ref 70–99)
GLUCOSE URINE: ABNORMAL
HCT VFR BLD CALC: 51 % (ref 40.7–50.3)
HEMOGLOBIN: 16.3 G/DL (ref 13–17)
IMMATURE GRANULOCYTES: 1 %
KETONES, URINE: NEGATIVE
LEUKOCYTE ESTERASE, URINE: ABNORMAL
LYMPHOCYTES # BLD: 18 % (ref 24–43)
MAGNESIUM: 2.4 MG/DL (ref 1.6–2.6)
MCH RBC QN AUTO: 26 PG (ref 25.2–33.5)
MCHC RBC AUTO-ENTMCNC: 32 G/DL (ref 28.4–34.8)
MCV RBC AUTO: 81.2 FL (ref 82.6–102.9)
MONOCYTES # BLD: 14 % (ref 3–12)
MUCUS: ABNORMAL
NEGATIVE BASE EXCESS, ART: 8 (ref 0–2)
NITRITE, URINE: NEGATIVE
NRBC AUTOMATED: 0 PER 100 WBC
O2 DEVICE/FLOW/%: ABNORMAL
OTHER OBSERVATIONS UA: ABNORMAL
PATIENT TEMP: 37
PDW BLD-RTO: 13.4 % (ref 11.8–14.4)
PH UA: 5.5 (ref 5–8)
PHOSPHORUS: 2.9 MG/DL (ref 2.5–4.5)
PLATELET # BLD: 310 K/UL (ref 138–453)
PLATELET ESTIMATE: ABNORMAL
PMV BLD AUTO: 11.3 FL (ref 8.1–13.5)
POC HCO3: 17.3 MMOL/L (ref 22–27)
POC O2 SATURATION: 95 %
POC PCO2 TEMP: ABNORMAL MM HG
POC PCO2: 31 MM HG (ref 32–45)
POC PH TEMP: ABNORMAL
POC PH: 7.35 (ref 7.35–7.45)
POC PO2 TEMP: ABNORMAL MM HG
POC PO2: 79 MM HG (ref 75–95)
POSITIVE BASE EXCESS, ART: ABNORMAL (ref 0–2)
POTASSIUM SERPL-SCNC: 3.8 MMOL/L (ref 3.7–5.3)
POTASSIUM SERPL-SCNC: 5.2 MMOL/L (ref 3.7–5.3)
PROTEIN UA: NEGATIVE
RBC # BLD: 6.28 M/UL (ref 4.21–5.77)
RBC # BLD: ABNORMAL 10*6/UL
RBC UA: ABNORMAL /HPF (ref 0–2)
RENAL EPITHELIAL, UA: ABNORMAL /HPF
SEG NEUTROPHILS: 64 % (ref 36–65)
SEGMENTED NEUTROPHILS ABSOLUTE COUNT: 3.94 K/UL (ref 1.5–8.1)
SODIUM BLD-SCNC: 126 MMOL/L (ref 135–144)
SODIUM BLD-SCNC: 135 MMOL/L (ref 135–144)
SPECIFIC GRAVITY UA: 1.02 (ref 1–1.03)
TCO2 (CALC), ART: 18 MMOL/L (ref 23–28)
TRICHOMONAS: ABNORMAL
TURBIDITY: ABNORMAL
URINE HGB: ABNORMAL
UROBILINOGEN, URINE: NORMAL
WBC # BLD: 6.1 K/UL (ref 3.5–11.3)
WBC # BLD: ABNORMAL 10*3/UL
WBC UA: ABNORMAL /HPF (ref 0–5)
YEAST: ABNORMAL

## 2019-08-07 PROCEDURE — 96374 THER/PROPH/DIAG INJ IV PUSH: CPT

## 2019-08-07 PROCEDURE — 2580000003 HC RX 258: Performed by: EMERGENCY MEDICINE

## 2019-08-07 PROCEDURE — 93005 ELECTROCARDIOGRAM TRACING: CPT | Performed by: EMERGENCY MEDICINE

## 2019-08-07 PROCEDURE — 36415 COLL VENOUS BLD VENIPUNCTURE: CPT

## 2019-08-07 PROCEDURE — 99284 EMERGENCY DEPT VISIT MOD MDM: CPT

## 2019-08-07 PROCEDURE — 82010 KETONE BODYS QUAN: CPT

## 2019-08-07 PROCEDURE — 87077 CULTURE AEROBIC IDENTIFY: CPT

## 2019-08-07 PROCEDURE — 85025 COMPLETE CBC W/AUTO DIFF WBC: CPT

## 2019-08-07 PROCEDURE — 2000000000 HC ICU R&B

## 2019-08-07 PROCEDURE — 96361 HYDRATE IV INFUSION ADD-ON: CPT

## 2019-08-07 PROCEDURE — 83036 HEMOGLOBIN GLYCOSYLATED A1C: CPT

## 2019-08-07 PROCEDURE — 81001 URINALYSIS AUTO W/SCOPE: CPT

## 2019-08-07 PROCEDURE — 87186 SC STD MICRODIL/AGAR DIL: CPT

## 2019-08-07 PROCEDURE — 87086 URINE CULTURE/COLONY COUNT: CPT

## 2019-08-07 PROCEDURE — 82947 ASSAY GLUCOSE BLOOD QUANT: CPT

## 2019-08-07 PROCEDURE — 80048 BASIC METABOLIC PNL TOTAL CA: CPT

## 2019-08-07 PROCEDURE — 6370000000 HC RX 637 (ALT 250 FOR IP): Performed by: NURSE PRACTITIONER

## 2019-08-07 PROCEDURE — 84100 ASSAY OF PHOSPHORUS: CPT

## 2019-08-07 PROCEDURE — 6360000002 HC RX W HCPCS: Performed by: NURSE PRACTITIONER

## 2019-08-07 PROCEDURE — 82803 BLOOD GASES ANY COMBINATION: CPT

## 2019-08-07 PROCEDURE — 36600 WITHDRAWAL OF ARTERIAL BLOOD: CPT

## 2019-08-07 PROCEDURE — APPSS45 APP SPLIT SHARED TIME 31-45 MINUTES: Performed by: NURSE PRACTITIONER

## 2019-08-07 PROCEDURE — 6370000000 HC RX 637 (ALT 250 FOR IP): Performed by: INTERNAL MEDICINE

## 2019-08-07 PROCEDURE — 99223 1ST HOSP IP/OBS HIGH 75: CPT | Performed by: INTERNAL MEDICINE

## 2019-08-07 PROCEDURE — 6370000000 HC RX 637 (ALT 250 FOR IP): Performed by: EMERGENCY MEDICINE

## 2019-08-07 PROCEDURE — 83735 ASSAY OF MAGNESIUM: CPT

## 2019-08-07 PROCEDURE — 2580000003 HC RX 258: Performed by: INTERNAL MEDICINE

## 2019-08-07 RX ORDER — 0.9 % SODIUM CHLORIDE 0.9 %
1000 INTRAVENOUS SOLUTION INTRAVENOUS ONCE
Status: COMPLETED | OUTPATIENT
Start: 2019-08-07 | End: 2019-08-07

## 2019-08-07 RX ORDER — 0.9 % SODIUM CHLORIDE 0.9 %
15 INTRAVENOUS SOLUTION INTRAVENOUS ONCE
Status: DISCONTINUED | OUTPATIENT
Start: 2019-08-07 | End: 2019-08-08

## 2019-08-07 RX ORDER — DILTIAZEM HYDROCHLORIDE 180 MG/1
360 CAPSULE, EXTENDED RELEASE ORAL DAILY
COMMUNITY
End: 2019-08-07

## 2019-08-07 RX ORDER — DEXTROSE MONOHYDRATE 25 G/50ML
12.5 INJECTION, SOLUTION INTRAVENOUS PRN
Status: DISCONTINUED | OUTPATIENT
Start: 2019-08-07 | End: 2019-08-08 | Stop reason: SDUPTHER

## 2019-08-07 RX ORDER — MAGNESIUM SULFATE 1 G/100ML
1 INJECTION INTRAVENOUS PRN
Status: DISCONTINUED | OUTPATIENT
Start: 2019-08-07 | End: 2019-08-07 | Stop reason: SDUPTHER

## 2019-08-07 RX ORDER — 0.9 % SODIUM CHLORIDE 0.9 %
15 INTRAVENOUS SOLUTION INTRAVENOUS ONCE
Status: DISCONTINUED | OUTPATIENT
Start: 2019-08-07 | End: 2019-08-07

## 2019-08-07 RX ORDER — DILTIAZEM HYDROCHLORIDE 180 MG/1
360 CAPSULE, COATED, EXTENDED RELEASE ORAL DAILY
Status: DISCONTINUED | OUTPATIENT
Start: 2019-08-07 | End: 2019-08-07 | Stop reason: SDUPTHER

## 2019-08-07 RX ORDER — DILTIAZEM HYDROCHLORIDE 180 MG/1
360 CAPSULE, COATED, EXTENDED RELEASE ORAL DAILY
Status: DISCONTINUED | OUTPATIENT
Start: 2019-08-07 | End: 2019-08-09 | Stop reason: HOSPADM

## 2019-08-07 RX ORDER — INSULIN GLARGINE 100 [IU]/ML
0.25 INJECTION, SOLUTION SUBCUTANEOUS NIGHTLY
Status: DISCONTINUED | OUTPATIENT
Start: 2019-08-07 | End: 2019-08-07

## 2019-08-07 RX ORDER — DILTIAZEM HYDROCHLORIDE 180 MG/1
360 CAPSULE, COATED, EXTENDED RELEASE ORAL DAILY
COMMUNITY
End: 2021-09-24

## 2019-08-07 RX ORDER — MAGNESIUM SULFATE 1 G/100ML
1 INJECTION INTRAVENOUS PRN
Status: DISCONTINUED | OUTPATIENT
Start: 2019-08-07 | End: 2019-08-09 | Stop reason: HOSPADM

## 2019-08-07 RX ORDER — DEXTROSE MONOHYDRATE 25 G/50ML
12.5 INJECTION, SOLUTION INTRAVENOUS PRN
Status: DISCONTINUED | OUTPATIENT
Start: 2019-08-07 | End: 2019-08-07 | Stop reason: SDUPTHER

## 2019-08-07 RX ORDER — DEXTROSE AND SODIUM CHLORIDE 5; .45 G/100ML; G/100ML
INJECTION, SOLUTION INTRAVENOUS CONTINUOUS PRN
Status: DISCONTINUED | OUTPATIENT
Start: 2019-08-07 | End: 2019-08-07 | Stop reason: SDUPTHER

## 2019-08-07 RX ORDER — SODIUM CHLORIDE 450 MG/100ML
INJECTION, SOLUTION INTRAVENOUS CONTINUOUS
Status: DISCONTINUED | OUTPATIENT
Start: 2019-08-07 | End: 2019-08-08

## 2019-08-07 RX ORDER — CETIRIZINE HYDROCHLORIDE 10 MG/1
10 TABLET ORAL DAILY PRN
Status: DISCONTINUED | OUTPATIENT
Start: 2019-08-07 | End: 2019-08-09 | Stop reason: HOSPADM

## 2019-08-07 RX ORDER — POTASSIUM CHLORIDE 7.45 MG/ML
10 INJECTION INTRAVENOUS PRN
Status: DISCONTINUED | OUTPATIENT
Start: 2019-08-07 | End: 2019-08-07 | Stop reason: SDUPTHER

## 2019-08-07 RX ORDER — SODIUM CHLORIDE 450 MG/100ML
INJECTION, SOLUTION INTRAVENOUS CONTINUOUS
Status: DISCONTINUED | OUTPATIENT
Start: 2019-08-07 | End: 2019-08-07

## 2019-08-07 RX ORDER — POTASSIUM CHLORIDE 7.45 MG/ML
10 INJECTION INTRAVENOUS PRN
Status: DISCONTINUED | OUTPATIENT
Start: 2019-08-07 | End: 2019-08-08

## 2019-08-07 RX ORDER — DEXTROSE AND SODIUM CHLORIDE 5; .45 G/100ML; G/100ML
INJECTION, SOLUTION INTRAVENOUS CONTINUOUS PRN
Status: DISCONTINUED | OUTPATIENT
Start: 2019-08-07 | End: 2019-08-09 | Stop reason: HOSPADM

## 2019-08-07 RX ADMIN — POTASSIUM CHLORIDE 10 MEQ: 7.46 INJECTION, SOLUTION INTRAVENOUS at 23:53

## 2019-08-07 RX ADMIN — SODIUM CHLORIDE 1000 ML: 9 INJECTION, SOLUTION INTRAVENOUS at 15:36

## 2019-08-07 RX ADMIN — SODIUM CHLORIDE 1000 ML: 9 INJECTION, SOLUTION INTRAVENOUS at 14:05

## 2019-08-07 RX ADMIN — POTASSIUM CHLORIDE 10 MEQ: 7.46 INJECTION, SOLUTION INTRAVENOUS at 22:54

## 2019-08-07 RX ADMIN — SODIUM CHLORIDE 10.75 UNITS/HR: 9 INJECTION, SOLUTION INTRAVENOUS at 19:37

## 2019-08-07 RX ADMIN — POTASSIUM CHLORIDE 10 MEQ: 7.46 INJECTION, SOLUTION INTRAVENOUS at 21:51

## 2019-08-07 RX ADMIN — SODIUM CHLORIDE: 4.5 INJECTION, SOLUTION INTRAVENOUS at 19:36

## 2019-08-07 RX ADMIN — INSULIN HUMAN 10 UNITS: 100 INJECTION, SOLUTION PARENTERAL at 16:41

## 2019-08-07 RX ADMIN — INSULIN HUMAN 10 UNITS: 100 INJECTION, SOLUTION PARENTERAL at 15:37

## 2019-08-07 RX ADMIN — ENOXAPARIN SODIUM 30 MG: 30 INJECTION SUBCUTANEOUS at 20:32

## 2019-08-07 ASSESSMENT — ENCOUNTER SYMPTOMS
SINUS PRESSURE: 0
SINUS PAIN: 0
APNEA: 0
SHORTNESS OF BREATH: 0
CONSTIPATION: 0
VOMITING: 1
NAUSEA: 1
EYES NEGATIVE: 1
COUGH: 0
FACIAL SWELLING: 0
ABDOMINAL PAIN: 0
ABDOMINAL PAIN: 1
EYE REDNESS: 0
RESPIRATORY NEGATIVE: 1
COLOR CHANGE: 0
DIARRHEA: 0
EYE DISCHARGE: 0

## 2019-08-07 ASSESSMENT — PAIN SCALES - GENERAL: PAINLEVEL_OUTOF10: 0

## 2019-08-07 NOTE — ED PROVIDER NOTES
[facility] ED  EMERGENCY DEPARTMENT ENCOUNTER      Pt Name: Genoveva Cummings  MRN: [id number]  [place] 1955  Date of evaluation: 8/7/2019  Provider: Vladimir Chinchilla MD    [address] [place] [address]       Chief Complaint   Patient presents with    Fatigue    Nausea         HISTORY OF PRESENT ILLNESS  (Location/Symptom, Timing/Onset, Context/Setting, Quality, Duration, Modifying Factors, Severity.)   Genoveva Cummings is a 59 y.o. male who presents to the emergency department for fatigue and nausea. It started several days ago and his mouth is been very dry and he is been very thirsty. He has had a slight cough but he attributed that to some environmental allergies and has been taking Zyrtec. He has a history of enlarged prostate with surgery and had to have a Whitmore for several months but has not had one in many months. He was worried he might be getting a urinary tract infection that he has had previously. No flank pain. He has been having some vomiting and vomited after drinking some milk today. His mouth feels very dry and he feels thirsty. Denies any pain. Nursing Notes were reviewed. ALLERGIES     Lisinopril    CURRENT MEDICATIONS       Previous Medications    CETIRIZINE (ZYRTEC) 10 MG TABLET    Take 1 tablet by mouth daily    DILTIAZEM (CARDIZEM CD) 300 MG EXTENDED RELEASE CAPSULE    Take 1 capsule by mouth daily    MULTIPLE VITAMINS-MINERALS (MULTIVITAMIN ADULT) TABS    Take 1 tablet by mouth daily       PAST MEDICAL HISTORY         Diagnosis Date    Anesthesia     POSTOP NAUSEA AND VOMITING    Cataracts, bilateral     Elevated PSA     Whitmore catheter in place     Hypertension     Kidney problem     STATES HAD DIALYSIS IN AUGUST 2018 AFTER HE COULDN'T URINATE BECAUSE OF HIS PROSTATE    Kidney stone     1990'S    Seasonal allergies     Sepsis ([address]     SVT (supraventricular tachycardia) ([address] 2018    DR. Kalpesh Fuentes Wears glasses     DISTANCE       SURGICAL HISTORY           Procedure Laterality Psychiatric/Behavioral: Negative for confusion. The patient is not nervous/anxious. Except as noted above the remainder of the review of systems was reviewed and negative. PHYSICAL EXAM    (up to 7 for level 4, 8 or more for level 5)     Vitals:    08/07/19 1235 08/07/19 1236   BP: (!) 162/108    Pulse: 88    Resp: 18    Temp: 98.6 °F (37 °C)    TempSrc: Oral    SpO2: 94%    Weight:  237 lb (107.5 kg)   Height:  6' (1.829 m)       Physical Exam   Constitutional: He is oriented to person, place, and time. He appears well-developed and well-nourished. No distress. HENT:   Head: Normocephalic and atraumatic. Oral mucosa is quite dry   Eyes: Right eye exhibits no discharge. Left eye exhibits no discharge. No scleral icterus. Neck: Neck supple. Cardiovascular: Normal rate and regular rhythm. Pulmonary/Chest: Effort normal and breath sounds normal. No stridor. No respiratory distress. He has no wheezes. He has no rales. Abdominal: Soft. He exhibits no distension. There is no tenderness. Musculoskeletal: Normal range of motion. Lymphadenopathy:     He has no cervical adenopathy. Neurological: He is alert and oriented to person, place, and time. Skin: Skin is warm and dry. No rash noted. He is not diaphoretic. No erythema. Psychiatric: He has a normal mood and affect. His behavior is normal.   Vitals reviewed.           DIAGNOSTIC RESULTS     EKG: All EKG's are interpreted by the Emergency Department Physician who either signs or Co-signs this chart in the absence of a cardiologist.    RADIOLOGY:   Non-plain film images such as CT, Ultrasound and MRI are read by the radiologist. Plain radiographic images are visualized and preliminarily interpreted by the emergency physician with the below findings:    Interpretation per the Radiologist below, if available at the time of this note:        LABS:  Labs Reviewed   CBC WITH AUTO DIFFERENTIAL - Abnormal; Notable for the following components: Result Value    RBC 6.28 (*)     Hematocrit 51.0 (*)     MCV 81.2 (*)     Lymphocytes 18 (*)     Monocytes 14 (*)     Immature Granulocytes 1 (*)     Absolute Lymph # 1.09 (*)     All other components within normal limits   BASIC METABOLIC PANEL - Abnormal; Notable for the following components:    Glucose 885 (*)     BUN 53 (*)     CREATININE 2.11 (*)     Bun/Cre Ratio 25 (*)     Sodium 126 (*)     Chloride 87 (*)     CO2 17 (*)     Anion Gap 22 (*)     GFR Non- 32 (*)     GFR  39 (*)     All other components within normal limits   URINALYSIS - Abnormal; Notable for the following components:    Turbidity UA TURBID (*)     Glucose, Ur 3+ (*)     Urine Hgb 2+ (*)     Leukocyte Esterase, Urine SMALL (*)     All other components within normal limits   MICROSCOPIC URINALYSIS - Abnormal; Notable for the following components:    Bacteria, UA FEW (*)     All other components within normal limits   POCT URINALYSIS DIPSTICK - Normal   URINE CULTURE   HEMOGLOBIN A1C   BETA-HYDROXYBUTYRATE       All other labs were within normal range or not returned as of this dictation. EMERGENCY DEPARTMENT COURSE and DIFFERENTIAL DIAGNOSIS/MDM:   Vitals:    Vitals:    08/07/19 1235 08/07/19 1236   BP: (!) 162/108    Pulse: 88    Resp: 18    Temp: 98.6 °F (37 °C)    TempSrc: Oral    SpO2: 94%    Weight:  237 lb (107.5 kg)   Height:  6' (1.829 m)       Orders Placed This Encounter   Medications    0.9 % sodium chloride bolus    0.9 % sodium chloride bolus    insulin regular (HUMULIN R;NOVOLIN R) injection 10 Units       Medical Decision Making: The patient is found to have new onset diabetes with a blood sugar of 885. He was given IV fluids and IV insulin and is being admitted. Treatment diagnosis and disposition were discussed with the patient.     CRITICAL CARE TIME     Due to the high probability of sudden and clinically significant deterioration in the patient's condition he required highest level of my preparedness to intervene urgently. I provided critical care time including documentation time, medication orders and management, reevaluation, vital sign assessment, ordering and reviewing of of lab tests ordering and reviewing of x-ray studies, and admission orders. Aggregate critical care time is 35 minutes including only time during which I was engaged in work directly related to his care and did not include time spent treating other patients simultaneously. CONSULTS:  IP CONSULT TO HOSPITALIST    PROCEDURES:  None    FINAL IMPRESSION      1. New onset type 2 diabetes mellitus ([address]          DISPOSITION/PLAN   DISPOSITION Decision To Admit 08/07/2019 03:21:43 PM      PATIENT REFERRED TO:   No follow-up provider specified.     DISCHARGE MEDICATIONS:     New Prescriptions    No medications on file         (Please note that portions of this note were completed with a voice recognition program.  Efforts were made to edit the dictations but occasionally words are mis-transcribed.)    Miguel Luevano MD  Attending Emergency Physician           Miguel Luevano MD  08/07/19 4101

## 2019-08-07 NOTE — H&P
[facility]    HISTORY AND PHYSICAL EXAMINATION            Date:   8/7/2019  Patient name:  Solange Reno  Date of admission:  8/7/2019 12:40 PM  MRN:   [id number]  Account:  [de-identified]  YOB: 1955  PCP:    No primary care provider on file. Room:   [place] [address]  Code Status:    Prior    Chief Complaint:     Chief Complaint   Patient presents with    Fatigue    Nausea       History Obtained From:     patient    History of Present Illness: The patient is a 59 y.o. Non-/non  male who presents with Fatigue and Nausea   and he is admitted to the hospital for the management of hypoglycemia. Patient has a family history of diabetes but no personal history of diabetes. Patient reports that over the past several years she has been told that he has elevated blood sugar but is never been diagnosed with prediabetes or diabetes. Over the past several weeks patient has been experiencing nausea, increased, thirst and polyuria. Patient stated that over the past 2 weeks he has also had no appetite and believes he is lost anywhere between 15 and 20 pounds. Patient has general fatigue without fever. Patient denies arthralgias and myalgias. Patient does complain of general abdominal discomfort without pain. Patient stated the symptoms are concerning to him however he believed that they were related to his history of BPH and a TURP approximately 7 months ago. Patient stated that his urinary frequency has become so persistent that he has had to utilize adult diapers throughout the day. Patient reported the emergency department with nausea and vague abdominal discomfort. During evaluation patient was found to have hyperglycemia with TAMARA. Patient was receiving IV hydration and an insulin bolus IV. Patient's blood sugar remains greater than 600 during emergency room treatment.   Additional IV insulin will be administered and we will Comments NOT REPORTED    Microscopic Urinalysis    Collection Time: 08/07/19  1:26 PM   Result Value Ref Range    -          WBC, UA 0 TO 2 0 - 5 /HPF    RBC, UA 5 TO 10 0 - 2 /HPF    Casts UA NOT REPORTED /LPF    Crystals UA NOT REPORTED None /HPF    Epithelial Cells UA None 0 - 5 /HPF    Renal Epithelial, Urine NOT REPORTED 0 /HPF    Bacteria, UA FEW (A) None    Mucus, UA NOT REPORTED None    Trichomonas, UA NOT REPORTED None    Amorphous, UA NOT REPORTED None    Other Observations UA NOT REPORTED NOT REQ.     Yeast, UA NOT REPORTED None   CBC Auto Differential    Collection Time: 08/07/19  2:01 PM   Result Value Ref Range    WBC 6.1 3.5 - 11.3 k/uL    RBC 6.28 (H) 4.21 - 5.77 m/uL    Hemoglobin 16.3 13.0 - 17.0 g/dL    Hematocrit 51.0 (H) 40.7 - 50.3 %    MCV 81.2 (L) 82.6 - 102.9 fL    MCH 26.0 25.2 - 33.5 pg    MCHC 32.0 28.4 - 34.8 g/dL    RDW 13.4 11.8 - 14.4 %    Platelets 469 273 - 237 k/uL    MPV 11.3 8.1 - 13.5 fL    NRBC Automated 0.0 0.0 per 100 WBC    Differential Type NOT REPORTED     Seg Neutrophils 64 36 - 65 %    Lymphocytes 18 (L) 24 - 43 %    Monocytes 14 (H) 3 - 12 %    Eosinophils % 2 1 - 4 %    Basophils 1 0 - 2 %    Immature Granulocytes 1 (H) 0 %    Segs Absolute 3.94 1.50 - 8.10 k/uL    Absolute Lymph # 1.09 (L) 1.10 - 3.70 k/uL    Absolute Mono # 0.84 0.10 - 1.20 k/uL    Absolute Eos # 0.10 0.00 - 0.44 k/uL    Basophils # 0.05 0.00 - 0.20 k/uL    Absolute Immature Granulocyte 0.05 0.00 - 0.30 k/uL    WBC Morphology NOT REPORTED     RBC Morphology MICROCYTOSIS PRESENT     Platelet Estimate NOT REPORTED    Basic Metabolic Panel    Collection Time: 08/07/19  2:01 PM   Result Value Ref Range    Glucose 885 (HH) 70 - 99 mg/dL    BUN 53 (H) 8 - 23 mg/dL    CREATININE 2.11 (H) 0.70 - 1.20 mg/dL    Bun/Cre Ratio 25 (H) 9 - 20    Calcium 9.4 8.6 - 10.4 mg/dL    Sodium 126 (L) 135 - 144 mmol/L    Potassium 5.2 3.7 - 5.3 mmol/L    Chloride 87 (L) 98 - 107 mmol/L    CO2 17 (L) 20 - 31 mmol/L    Anion

## 2019-08-08 LAB
ANION GAP SERPL CALCULATED.3IONS-SCNC: 11 MMOL/L (ref 9–17)
ANION GAP SERPL CALCULATED.3IONS-SCNC: 14 MMOL/L (ref 9–17)
ANION GAP SERPL CALCULATED.3IONS-SCNC: 15 MMOL/L (ref 9–17)
ANION GAP SERPL CALCULATED.3IONS-SCNC: 15 MMOL/L (ref 9–17)
BUN BLDV-MCNC: 34 MG/DL (ref 8–23)
BUN BLDV-MCNC: 38 MG/DL (ref 8–23)
BUN BLDV-MCNC: 42 MG/DL (ref 8–23)
BUN BLDV-MCNC: 46 MG/DL (ref 8–23)
BUN/CREAT BLD: 19 (ref 9–20)
BUN/CREAT BLD: 22 (ref 9–20)
BUN/CREAT BLD: 23 (ref 9–20)
BUN/CREAT BLD: 29 (ref 9–20)
CALCIUM SERPL-MCNC: 8.3 MG/DL (ref 8.6–10.4)
CALCIUM SERPL-MCNC: 8.4 MG/DL (ref 8.6–10.4)
CALCIUM SERPL-MCNC: 8.8 MG/DL (ref 8.6–10.4)
CALCIUM SERPL-MCNC: 8.8 MG/DL (ref 8.6–10.4)
CHLORIDE BLD-SCNC: 100 MMOL/L (ref 98–107)
CHLORIDE BLD-SCNC: 103 MMOL/L (ref 98–107)
CHLORIDE BLD-SCNC: 105 MMOL/L (ref 98–107)
CHLORIDE BLD-SCNC: 99 MMOL/L (ref 98–107)
CO2: 20 MMOL/L (ref 20–31)
CO2: 20 MMOL/L (ref 20–31)
CO2: 22 MMOL/L (ref 20–31)
CO2: 23 MMOL/L (ref 20–31)
CREAT SERPL-MCNC: 1.59 MG/DL (ref 0.7–1.2)
CREAT SERPL-MCNC: 1.75 MG/DL (ref 0.7–1.2)
CREAT SERPL-MCNC: 1.83 MG/DL (ref 0.7–1.2)
CREAT SERPL-MCNC: 1.86 MG/DL (ref 0.7–1.2)
EKG ATRIAL RATE: 78 BPM
EKG P AXIS: 61 DEGREES
EKG P-R INTERVAL: 168 MS
EKG Q-T INTERVAL: 476 MS
EKG QRS DURATION: 120 MS
EKG QTC CALCULATION (BAZETT): 542 MS
EKG R AXIS: -75 DEGREES
EKG T AXIS: 55 DEGREES
EKG VENTRICULAR RATE: 78 BPM
ESTIMATED AVERAGE GLUCOSE: 390 MG/DL
GFR AFRICAN AMERICAN: 45 ML/MIN
GFR AFRICAN AMERICAN: 45 ML/MIN
GFR AFRICAN AMERICAN: 48 ML/MIN
GFR AFRICAN AMERICAN: 53 ML/MIN
GFR NON-AFRICAN AMERICAN: 37 ML/MIN
GFR NON-AFRICAN AMERICAN: 37 ML/MIN
GFR NON-AFRICAN AMERICAN: 39 ML/MIN
GFR NON-AFRICAN AMERICAN: 44 ML/MIN
GFR SERPL CREATININE-BSD FRML MDRD: ABNORMAL ML/MIN/{1.73_M2}
GLUCOSE BLD-MCNC: 138 MG/DL (ref 75–110)
GLUCOSE BLD-MCNC: 170 MG/DL (ref 75–110)
GLUCOSE BLD-MCNC: 172 MG/DL (ref 75–110)
GLUCOSE BLD-MCNC: 177 MG/DL (ref 75–110)
GLUCOSE BLD-MCNC: 177 MG/DL (ref 75–110)
GLUCOSE BLD-MCNC: 180 MG/DL (ref 70–99)
GLUCOSE BLD-MCNC: 185 MG/DL (ref 75–110)
GLUCOSE BLD-MCNC: 188 MG/DL (ref 75–110)
GLUCOSE BLD-MCNC: 189 MG/DL (ref 70–99)
GLUCOSE BLD-MCNC: 256 MG/DL (ref 75–110)
GLUCOSE BLD-MCNC: 259 MG/DL (ref 75–110)
GLUCOSE BLD-MCNC: 273 MG/DL (ref 75–110)
GLUCOSE BLD-MCNC: 287 MG/DL (ref 70–99)
GLUCOSE BLD-MCNC: 288 MG/DL (ref 70–99)
GLUCOSE BLD-MCNC: 393 MG/DL (ref 75–110)
GLUCOSE BLD-MCNC: 426 MG/DL (ref 75–110)
HBA1C MFR BLD: 15.2 % (ref 4–6)
MAGNESIUM: 2.1 MG/DL (ref 1.6–2.6)
MAGNESIUM: 2.3 MG/DL (ref 1.6–2.6)
MAGNESIUM: 2.4 MG/DL (ref 1.6–2.6)
MAGNESIUM: 2.4 MG/DL (ref 1.6–2.6)
PHOSPHORUS: 2.4 MG/DL (ref 2.5–4.5)
PHOSPHORUS: 2.7 MG/DL (ref 2.5–4.5)
PHOSPHORUS: 3 MG/DL (ref 2.5–4.5)
PHOSPHORUS: 3.2 MG/DL (ref 2.5–4.5)
POTASSIUM SERPL-SCNC: 3.9 MMOL/L (ref 3.7–5.3)
POTASSIUM SERPL-SCNC: 4.1 MMOL/L (ref 3.7–5.3)
POTASSIUM SERPL-SCNC: 4.1 MMOL/L (ref 3.7–5.3)
POTASSIUM SERPL-SCNC: 4.7 MMOL/L (ref 3.7–5.3)
SODIUM BLD-SCNC: 134 MMOL/L (ref 135–144)
SODIUM BLD-SCNC: 135 MMOL/L (ref 135–144)
SODIUM BLD-SCNC: 137 MMOL/L (ref 135–144)
SODIUM BLD-SCNC: 141 MMOL/L (ref 135–144)

## 2019-08-08 PROCEDURE — 2580000003 HC RX 258: Performed by: INTERNAL MEDICINE

## 2019-08-08 PROCEDURE — 2580000003 HC RX 258: Performed by: NURSE PRACTITIONER

## 2019-08-08 PROCEDURE — 6370000000 HC RX 637 (ALT 250 FOR IP): Performed by: INTERNAL MEDICINE

## 2019-08-08 PROCEDURE — 36415 COLL VENOUS BLD VENIPUNCTURE: CPT

## 2019-08-08 PROCEDURE — 6370000000 HC RX 637 (ALT 250 FOR IP): Performed by: NURSE PRACTITIONER

## 2019-08-08 PROCEDURE — 84100 ASSAY OF PHOSPHORUS: CPT

## 2019-08-08 PROCEDURE — 97116 GAIT TRAINING THERAPY: CPT

## 2019-08-08 PROCEDURE — 2500000003 HC RX 250 WO HCPCS: Performed by: NURSE PRACTITIONER

## 2019-08-08 PROCEDURE — 99232 SBSQ HOSP IP/OBS MODERATE 35: CPT | Performed by: INTERNAL MEDICINE

## 2019-08-08 PROCEDURE — 6360000002 HC RX W HCPCS: Performed by: NURSE PRACTITIONER

## 2019-08-08 PROCEDURE — 80048 BASIC METABOLIC PNL TOTAL CA: CPT

## 2019-08-08 PROCEDURE — 93010 ELECTROCARDIOGRAM REPORT: CPT | Performed by: INTERNAL MEDICINE

## 2019-08-08 PROCEDURE — 82947 ASSAY GLUCOSE BLOOD QUANT: CPT

## 2019-08-08 PROCEDURE — 83735 ASSAY OF MAGNESIUM: CPT

## 2019-08-08 PROCEDURE — 97166 OT EVAL MOD COMPLEX 45 MIN: CPT

## 2019-08-08 PROCEDURE — 6360000002 HC RX W HCPCS: Performed by: INTERNAL MEDICINE

## 2019-08-08 PROCEDURE — 97162 PT EVAL MOD COMPLEX 30 MIN: CPT

## 2019-08-08 PROCEDURE — 2060000000 HC ICU INTERMEDIATE R&B

## 2019-08-08 PROCEDURE — 97535 SELF CARE MNGMENT TRAINING: CPT

## 2019-08-08 RX ORDER — DEXTROSE MONOHYDRATE 25 G/50ML
12.5 INJECTION, SOLUTION INTRAVENOUS PRN
Status: DISCONTINUED | OUTPATIENT
Start: 2019-08-08 | End: 2019-08-09 | Stop reason: HOSPADM

## 2019-08-08 RX ORDER — INSULIN GLARGINE 100 [IU]/ML
3 INJECTION, SOLUTION SUBCUTANEOUS ONCE
Status: COMPLETED | OUTPATIENT
Start: 2019-08-08 | End: 2019-08-08

## 2019-08-08 RX ORDER — POTASSIUM CHLORIDE 20 MEQ/1
40 TABLET, EXTENDED RELEASE ORAL PRN
Status: DISCONTINUED | OUTPATIENT
Start: 2019-08-08 | End: 2019-08-09 | Stop reason: HOSPADM

## 2019-08-08 RX ORDER — INSULIN GLARGINE 100 [IU]/ML
15 INJECTION, SOLUTION SUBCUTANEOUS 2 TIMES DAILY
Status: DISCONTINUED | OUTPATIENT
Start: 2019-08-08 | End: 2019-08-09

## 2019-08-08 RX ORDER — SODIUM CHLORIDE 9 MG/ML
INJECTION, SOLUTION INTRAVENOUS CONTINUOUS
Status: DISCONTINUED | OUTPATIENT
Start: 2019-08-08 | End: 2019-08-08

## 2019-08-08 RX ORDER — NICOTINE POLACRILEX 4 MG
15 LOZENGE BUCCAL PRN
Status: DISCONTINUED | OUTPATIENT
Start: 2019-08-08 | End: 2019-08-09 | Stop reason: HOSPADM

## 2019-08-08 RX ORDER — INSULIN GLARGINE 100 [IU]/ML
15 INJECTION, SOLUTION SUBCUTANEOUS DAILY
Status: DISCONTINUED | OUTPATIENT
Start: 2019-08-09 | End: 2019-08-08

## 2019-08-08 RX ORDER — POTASSIUM CHLORIDE 7.45 MG/ML
10 INJECTION INTRAVENOUS PRN
Status: DISCONTINUED | OUTPATIENT
Start: 2019-08-08 | End: 2019-08-09 | Stop reason: HOSPADM

## 2019-08-08 RX ORDER — HYDRALAZINE HYDROCHLORIDE 20 MG/ML
10 INJECTION INTRAMUSCULAR; INTRAVENOUS EVERY 4 HOURS PRN
Status: DISCONTINUED | OUTPATIENT
Start: 2019-08-08 | End: 2019-08-09 | Stop reason: HOSPADM

## 2019-08-08 RX ORDER — DEXTROSE MONOHYDRATE 50 MG/ML
100 INJECTION, SOLUTION INTRAVENOUS PRN
Status: DISCONTINUED | OUTPATIENT
Start: 2019-08-08 | End: 2019-08-09 | Stop reason: HOSPADM

## 2019-08-08 RX ORDER — INSULIN GLARGINE 100 [IU]/ML
12 INJECTION, SOLUTION SUBCUTANEOUS DAILY
Status: DISCONTINUED | OUTPATIENT
Start: 2019-08-08 | End: 2019-08-08

## 2019-08-08 RX ADMIN — POTASSIUM CHLORIDE 10 MEQ: 7.46 INJECTION, SOLUTION INTRAVENOUS at 07:12

## 2019-08-08 RX ADMIN — POTASSIUM CHLORIDE 10 MEQ: 7.46 INJECTION, SOLUTION INTRAVENOUS at 06:07

## 2019-08-08 RX ADMIN — ENOXAPARIN SODIUM 30 MG: 30 INJECTION SUBCUTANEOUS at 09:42

## 2019-08-08 RX ADMIN — INSULIN LISPRO 5 UNITS: 100 INJECTION, SOLUTION INTRAVENOUS; SUBCUTANEOUS at 21:30

## 2019-08-08 RX ADMIN — CEFTRIAXONE SODIUM 1 G: 1 INJECTION, POWDER, FOR SOLUTION INTRAMUSCULAR; INTRAVENOUS at 09:42

## 2019-08-08 RX ADMIN — SODIUM CHLORIDE: 4.5 INJECTION, SOLUTION INTRAVENOUS at 00:01

## 2019-08-08 RX ADMIN — ENOXAPARIN SODIUM 30 MG: 30 INJECTION SUBCUTANEOUS at 21:29

## 2019-08-08 RX ADMIN — SODIUM CHLORIDE: 9 INJECTION, SOLUTION INTRAVENOUS at 09:42

## 2019-08-08 RX ADMIN — INSULIN GLARGINE 12 UNITS: 100 INJECTION, SOLUTION SUBCUTANEOUS at 05:25

## 2019-08-08 RX ADMIN — INSULIN GLARGINE 3 UNITS: 100 INJECTION, SOLUTION SUBCUTANEOUS at 08:12

## 2019-08-08 RX ADMIN — POTASSIUM CHLORIDE 10 MEQ: 7.46 INJECTION, SOLUTION INTRAVENOUS at 02:15

## 2019-08-08 RX ADMIN — INSULIN LISPRO 18 UNITS: 100 INJECTION, SOLUTION INTRAVENOUS; SUBCUTANEOUS at 12:10

## 2019-08-08 RX ADMIN — POTASSIUM CHLORIDE 10 MEQ: 7.46 INJECTION, SOLUTION INTRAVENOUS at 05:09

## 2019-08-08 RX ADMIN — SODIUM PHOSPHATE, MONOBASIC, MONOHYDRATE 10 MMOL: 276; 142 INJECTION, SOLUTION INTRAVENOUS at 01:53

## 2019-08-08 RX ADMIN — INSULIN GLARGINE 15 UNITS: 100 INJECTION, SOLUTION SUBCUTANEOUS at 21:30

## 2019-08-08 RX ADMIN — INSULIN LISPRO 15 UNITS: 100 INJECTION, SOLUTION INTRAVENOUS; SUBCUTANEOUS at 17:10

## 2019-08-08 RX ADMIN — DILTIAZEM HYDROCHLORIDE 360 MG: 180 CAPSULE, COATED, EXTENDED RELEASE ORAL at 09:42

## 2019-08-08 RX ADMIN — POTASSIUM CHLORIDE 10 MEQ: 7.46 INJECTION, SOLUTION INTRAVENOUS at 03:17

## 2019-08-08 RX ADMIN — DEXTROSE AND SODIUM CHLORIDE: 5; 450 INJECTION, SOLUTION INTRAVENOUS at 02:10

## 2019-08-08 RX ADMIN — POTASSIUM CHLORIDE 10 MEQ: 7.46 INJECTION, SOLUTION INTRAVENOUS at 01:19

## 2019-08-08 ASSESSMENT — PAIN SCALES - GENERAL: PAINLEVEL_OUTOF10: 0

## 2019-08-08 NOTE — FLOWSHEET NOTE
Patient receives Sacrament of the Sick (anointing) from [facility].    [facility] will follow as needed. (writer charting for [employer].)     03/37/14 1554   Encounter Summary   Services provided to: Patient   Referral/Consult From: [facility] [address] [place] [address] Rd   (8/8/19 anointed)   Complexity of Encounter Low   Length of Encounter 15 minutes   Routine   Type Initial   Sacraments   Sacrament of Sick-Anointing Anointed  (8/8/19 Fr. Alisson ePlletier)

## 2019-08-08 NOTE — PROGRESS NOTES
[facility]    Progress Note    8/8/2019    7:26 AM    Name:   Salomon James  MRN:     [id number]     Acct:      [de-identified]   Room:   [address] [place] [address] Day:  1  Admit Date:  8/7/2019 12:40 PM    PCP:   No primary care provider on file. Code Status:  Full Code    Subjective:     C/C:   Chief Complaint   Patient presents with    Fatigue    Nausea     Interval History Status:    Doing well denies any nausea or vomiting  Current blood sugars 170, CO2 23  Anion gap 11  He already got 12 units of insulin Lantus and insulin drip has been stopped   Urine culture growing non-lactose fermenting gram-negative rods  Brief History:   The patient is a 59 y.o. Non-/non  male who presents with Fatigue and Nausea   and he is admitted to the hospital for the management of hypoglycemia. Patient has a family history of diabetes but no personal history of diabetes. Patient reports that over the past several years she has been told that he has elevated blood sugar but is never been diagnosed with prediabetes or diabetes. Over the past several weeks patient has been experiencing nausea, increased, thirst and polyuria. Patient stated that over the past 2 weeks he has also had no appetite and believes he is lost anywhere between 15 and 20 pounds. Patient has general fatigue without fever. Patient denies arthralgias and myalgias. Patient does complain of general abdominal discomfort without pain. Patient stated the symptoms are concerning to him however he believed that they were related to his history of BPH and a TURP approximately 7 months ago. Patient stated that his urinary frequency has become so persistent that he has had to utilize adult diapers throughout the day. Patient reported the emergency department with nausea and vague abdominal discomfort. During evaluation patient was found to have hyperglycemia with TAMARA.   Patient was receiving IV hydration and an insulin bolus IV. Patient's blood sugar remains greater than 600 during emergency room treatment. Additional IV insulin will be administered and we will attempt to manage this patient's blood sugars with subcutaneous insulin. ABG will be collected. This will be used to determine if the patient will require insulin drip. At this time patient expects an appropriate medical diagnosis and management of his hyperglycemia so that he can return to his place of employment. Patient is open all medical therapies does not express any special needs. Review of Systems:     Constitutional:  negative for chills, fevers, sweats  Respiratory:  negative for cough, dyspnea on exertion, shortness of breath, wheezing  Cardiovascular:  negative for chest pain, chest pressure/discomfort, lower extremity edema, palpitations  Gastrointestinal:  negative for abdominal pain, constipation, diarrhea, nausea, vomiting  Neurological:  negative for dizziness, headache    Medications: Allergies:     Allergies   Allergen Reactions    Lisinopril Anaphylaxis     angioedema       Current Meds:   Scheduled Meds:    insulin glargine  3 Units Subcutaneous Once    insulin lispro  0-18 Units Subcutaneous TID WC    insulin lispro  0-9 Units Subcutaneous Nightly    [START ON 8/9/2019] insulin glargine  15 Units Subcutaneous Daily    enoxaparin  30 mg Subcutaneous BID    diltiazem  360 mg Oral Daily    sodium chloride  15 mL/kg Intravenous Once     Continuous Infusions:    dextrose      dextrose 5 % and 0.45 % NaCl Stopped (08/08/19 0718)    sodium chloride Stopped (08/08/19 0210)     PRN Meds: glucose, dextrose, glucagon (rDNA), dextrose, cetirizine, dextrose 5 % and 0.45 % NaCl, potassium chloride, magnesium sulfate, sodium phosphate IVPB **OR** sodium phosphate IVPB **OR** sodium phosphate IVPB    Data:     Past Medical History:   has a past medical history of Anesthesia, Cataracts, bilateral, Elevated PSA, Whitmore catheter in place, Hypertension, Kidney problem, Kidney stone, Seasonal allergies, Sepsis ([address] SVT (supraventricular tachycardia) ([facility] and Wears glasses. Social History:   reports that he has never smoked. He has never used smokeless tobacco. He reports that he drinks alcohol. He reports that he does not use drugs. Family History:   Family History   Problem Relation Age of Onset    Hypertension Mother     Diabetes Father     Parkinsonism Father     Alzheimer's Disease Father     Alzheimer's Disease Maternal Grandmother     Cancer Maternal Grandfather     Cancer Paternal Grandmother     COPD Paternal Grandfather        Vitals:  /86   Pulse 63   Temp 98.3 °F (36.8 °C) (Infrared)   Resp 13   Ht 6' (1.829 m)   Wt 238 lb (108 kg)   SpO2 94%   BMI 32.28 kg/m²   Temp (24hrs), Av.3 °F (36.8 °C), Min:97.9 °F (36.6 °C), Max:98.6 °F (37 °C)    Recent Labs     19  0404 19  0458 19  0557 19  0656   POCGLU 177* 172* 177* 170*       I/O (24Hr):     Intake/Output Summary (Last 24 hours) at 2019 0726  Last data filed at 2019 0535  Gross per 24 hour   Intake 2955 ml   Output 625 ml   Net 2330 ml       Labs:  Hematology:  Recent Labs     19  1401   WBC 6.1   RBC 6.28*   HGB 16.3   HCT 51.0*   MCV 81.2*   MCH 26.0   MCHC 32.0   RDW 13.4      MPV 11.3     Chemistry:  Recent Labs     19  2105 19  0018 19  0408    135 137   K 3.8 4.1 3.9   CL 98 100 103   CO2 19* 20 23   GLUCOSE 429* 288* 180*   BUN 48* 46* 42*   CREATININE 1.96* 1.59* 1.86*   MG 2.4 2.4 2.4   ANIONGAP 18* 15 11   LABGLOM 35* 44* 37*   GFRAA 42* 53* 45*   CALCIUM 9.0 8.8 8.4*   PHOS 2.9 2.7 3.2     Recent Labs     19  0206 19  0305 19  0404 19  0458 19  0557 19  0656   POCGLU 185* 188* 177* 172* 177* 170*     ABG:  Lab Results   Component Value Date    POCPH 7.35 2019    POCPCO2 31 2019    POCPO2 79 2019    POCHCO3

## 2019-08-09 VITALS
TEMPERATURE: 97.7 F | HEIGHT: 72 IN | BODY MASS INDEX: 33.1 KG/M2 | HEART RATE: 71 BPM | OXYGEN SATURATION: 94 % | RESPIRATION RATE: 18 BRPM | DIASTOLIC BLOOD PRESSURE: 77 MMHG | WEIGHT: 244.4 LBS | SYSTOLIC BLOOD PRESSURE: 139 MMHG

## 2019-08-09 LAB
ANION GAP SERPL CALCULATED.3IONS-SCNC: 15 MMOL/L (ref 9–17)
BUN BLDV-MCNC: 29 MG/DL (ref 8–23)
BUN/CREAT BLD: 18 (ref 9–20)
CALCIUM SERPL-MCNC: 8 MG/DL (ref 8.6–10.4)
CHLORIDE BLD-SCNC: 102 MMOL/L (ref 98–107)
CO2: 20 MMOL/L (ref 20–31)
CREAT SERPL-MCNC: 1.57 MG/DL (ref 0.7–1.2)
CULTURE: ABNORMAL
GFR AFRICAN AMERICAN: 54 ML/MIN
GFR NON-AFRICAN AMERICAN: 45 ML/MIN
GFR SERPL CREATININE-BSD FRML MDRD: ABNORMAL ML/MIN/{1.73_M2}
GFR SERPL CREATININE-BSD FRML MDRD: ABNORMAL ML/MIN/{1.73_M2}
GLUCOSE BLD-MCNC: 205 MG/DL (ref 75–110)
GLUCOSE BLD-MCNC: 220 MG/DL (ref 70–99)
GLUCOSE BLD-MCNC: 323 MG/DL (ref 75–110)
GLUCOSE BLD-MCNC: >600 MG/DL (ref 75–110)
GLUCOSE BLD-MCNC: >600 MG/DL (ref 75–110)
Lab: ABNORMAL
MAGNESIUM: 2.1 MG/DL (ref 1.6–2.6)
PHOSPHORUS: 3.4 MG/DL (ref 2.5–4.5)
POTASSIUM SERPL-SCNC: 3.6 MMOL/L (ref 3.7–5.3)
SODIUM BLD-SCNC: 137 MMOL/L (ref 135–144)
SPECIMEN DESCRIPTION: ABNORMAL

## 2019-08-09 PROCEDURE — 97110 THERAPEUTIC EXERCISES: CPT

## 2019-08-09 PROCEDURE — 36415 COLL VENOUS BLD VENIPUNCTURE: CPT

## 2019-08-09 PROCEDURE — 99233 SBSQ HOSP IP/OBS HIGH 50: CPT | Performed by: INTERNAL MEDICINE

## 2019-08-09 PROCEDURE — 84100 ASSAY OF PHOSPHORUS: CPT

## 2019-08-09 PROCEDURE — 83735 ASSAY OF MAGNESIUM: CPT

## 2019-08-09 PROCEDURE — 97116 GAIT TRAINING THERAPY: CPT

## 2019-08-09 PROCEDURE — 82947 ASSAY GLUCOSE BLOOD QUANT: CPT

## 2019-08-09 PROCEDURE — 6370000000 HC RX 637 (ALT 250 FOR IP): Performed by: INTERNAL MEDICINE

## 2019-08-09 PROCEDURE — 80048 BASIC METABOLIC PNL TOTAL CA: CPT

## 2019-08-09 PROCEDURE — 6370000000 HC RX 637 (ALT 250 FOR IP): Performed by: NURSE PRACTITIONER

## 2019-08-09 RX ORDER — CEFUROXIME AXETIL 250 MG/1
250 TABLET ORAL 2 TIMES DAILY
Qty: 10 TABLET | Refills: 0 | Status: SHIPPED | OUTPATIENT
Start: 2019-08-09 | End: 2019-08-14

## 2019-08-09 RX ORDER — INSULIN GLARGINE 100 [IU]/ML
18 INJECTION, SOLUTION SUBCUTANEOUS 2 TIMES DAILY
Status: DISCONTINUED | OUTPATIENT
Start: 2019-08-09 | End: 2019-08-09 | Stop reason: HOSPADM

## 2019-08-09 RX ORDER — CEFUROXIME AXETIL 500 MG/1
500 TABLET ORAL ONCE
Status: COMPLETED | OUTPATIENT
Start: 2019-08-09 | End: 2019-08-09

## 2019-08-09 RX ADMIN — INSULIN LISPRO 12 UNITS: 100 INJECTION, SOLUTION INTRAVENOUS; SUBCUTANEOUS at 12:46

## 2019-08-09 RX ADMIN — CEFUROXIME AXETIL 500 MG: 500 TABLET ORAL at 10:11

## 2019-08-09 RX ADMIN — DILTIAZEM HYDROCHLORIDE 360 MG: 180 CAPSULE, COATED, EXTENDED RELEASE ORAL at 10:11

## 2019-08-09 RX ADMIN — INSULIN LISPRO 6 UNITS: 100 INJECTION, SOLUTION INTRAVENOUS; SUBCUTANEOUS at 10:18

## 2019-08-09 ASSESSMENT — PAIN SCALES - GENERAL: PAINLEVEL_OUTOF10: 0

## 2019-08-09 NOTE — DISCHARGE SUMMARY
[facility]    Discharge Summary     Patient ID: Marcello Huston  :  1955   MRN: [id number]     ACCOUNT:  [de-identified]   Patient's PCP: No primary care provider on file. Admit Date: 2019   Discharge Date: 8/10/2019   Length of Stay: 2  Code Status:  Prior  Admitting Physician: Angi Reid MD  Discharge Physician: Angi Reid MD     Active Discharge Diagnoses:     Hospital Problem Lists:  Principal Problem:    Diabetic ketoacidosis without coma associated with type 2 diabetes mellitus ([facility] [address]  Active Problems:    Hyperkalemia    Hypertension    Renal failure    SVT (supraventricular tachycardia) ([facility])    Hypertrophy of prostate with urinary retention    Incomplete RBBB    New onset type 2 diabetes mellitus ([facility])    CKD (chronic kidney disease) stage 3, GFR 30-59 ml/min ([facility])    Hyperglycemia without ketosis    TAMARA (acute kidney injury) ([facility] [address]  Resolved Problems:    * No resolved hospital problems. *    Possible UTI-non-lactose fermenting gram-negative rods    Admission Condition:  poor     Discharged Condition: good    Hospital Stay:   Admitting history;   The patient is a 60 y.o.  Non-/non  male who presents with Fatigue and Nausea   and he is admitted to the hospital for the management of hypoglycemia. Al Cristobal has a family history of diabetes but no personal history of diabetes. Al Cristobal reports that over the past several years she has been told that he has elevated blood sugar but is never been diagnosed with prediabetes or diabetes.  Over the past several weeks patient has been experiencing nausea, increased, thirst and polyuria.  Patient stated that over the past 2 weeks he has also had no appetite and believes he is lost anywhere between 15 and 20 pounds.  Patient has general fatigue without fever.  Patient denies arthralgias and myalgias.  Patient does complain of general abdominal discomfort without pain.  Patient stated the symptoms are concerning to him however he believed that they were related to his history of BPH and a TURP approximately 7 months ago. Danny Barrera stated that his urinary frequency has become so persistent that he has had to utilize adult diapers throughout the day.  Patient reported the emergency department with nausea and vague abdominal discomfort.  During evaluation patient was found to have hyperglycemia with TAMARA.  Patient was receiving IV hydration and an insulin bolus IV.  Patient's blood sugar remains greater than 600 during emergency room treatment.  Additional IV insulin will be administered and we will attempt to manage this patient's blood sugars with subcutaneous insulin.  ABG will be collected.  This will be used to determine if the patient will require insulin drip.  At this time patient expects an appropriate medical diagnosis and management of his hyperglycemia so that he can return to his place of employment. Danny Barrera is open all medical therapies does not express any special needs.     Hospital Course:   Was treated for DKA and subsequently was switched to Lantus insulin  Doing well denies any nausea or vomiting, feels much better than before  Current blood sugars 205, CO2 20  A1c 15.2  Creatinine 1.57  Currently on twice daily dose of Lantus insulin  Urine culture growing non-lactose fermenting gram-negative rods    Primary Problem  Diabetic ketoacidosis without coma associated with type 2 diabetes mellitus ([address]  Possible UTI-non-lactose fermenting gram-negative rods       Active Hospital Problems     Diagnosis Date Noted    Hyperglycemia without ketosis [R73.9] 08/07/2019    TAMARA (acute kidney injury) ([address] [N17.9] 08/07/2019    Diabetic ketoacidosis without coma associated with type 2 diabetes mellitus ([address] [E11.10] 08/07/2019    CKD (chronic kidney disease) stage 3, GFR 30-59 ml/min ([address] [N18.3] 10/24/2018    New onset type 2 diabetes mellitus ([address] [E11.9] 08/21/2018    Hypertrophy of

## 2019-08-09 NOTE — PROGRESS NOTES
[facility]    Progress Note    8/9/2019    8:01 AM    Name:   Darcy Valentino  MRN:     [id number]     Acct:      [de-identified]   Room:   [address] [place] [address] Day:  2  Admit Date:  8/7/2019 12:40 PM    PCP:   No primary care provider on file. Code Status:  Full Code    Subjective:     C/C:   Chief Complaint   Patient presents with    Fatigue    Nausea     Interval History Status:    Doing well denies any nausea or vomiting, feels much better than before  Current blood sugars 205, CO2 20  A1c 15.2  Creatinine 1.57  Currently on twice daily dose of Lantus insulin  Urine culture growing non-lactose fermenting gram-negative rods  Brief History:   The patient is a 59 y.o. Non-/non  male who presents with Fatigue and Nausea   and he is admitted to the hospital for the management of hypoglycemia. Patient has a family history of diabetes but no personal history of diabetes. Patient reports that over the past several years she has been told that he has elevated blood sugar but is never been diagnosed with prediabetes or diabetes. Over the past several weeks patient has been experiencing nausea, increased, thirst and polyuria. Patient stated that over the past 2 weeks he has also had no appetite and believes he is lost anywhere between 15 and 20 pounds. Patient has general fatigue without fever. Patient denies arthralgias and myalgias. Patient does complain of general abdominal discomfort without pain. Patient stated the symptoms are concerning to him however he believed that they were related to his history of BPH and a TURP approximately 7 months ago. Patient stated that his urinary frequency has become so persistent that he has had to utilize adult diapers throughout the day. Patient reported the emergency department with nausea and vague abdominal discomfort. During evaluation patient was found to have hyperglycemia with TAMARA.   Patient was receiving IV hydration and an insulin bolus IV. Patient's blood sugar remains greater than 600 during emergency room treatment. Additional IV insulin will be administered and we will attempt to manage this patient's blood sugars with subcutaneous insulin. ABG will be collected. This will be used to determine if the patient will require insulin drip. At this time patient expects an appropriate medical diagnosis and management of his hyperglycemia so that he can return to his place of employment. Patient is open all medical therapies does not express any special needs. Review of Systems:     Constitutional:  negative for chills, fevers, sweats  Respiratory:  negative for cough, dyspnea on exertion, shortness of breath, wheezing  Cardiovascular:  negative for chest pain, chest pressure/discomfort, lower extremity edema, palpitations  Gastrointestinal:  negative for abdominal pain, constipation, diarrhea, nausea, vomiting  Neurological:  negative for dizziness, headache    Medications: Allergies:     Allergies   Allergen Reactions    Lisinopril Anaphylaxis     angioedema       Current Meds:   Scheduled Meds:    insulin lispro  0-18 Units Subcutaneous TID WC    insulin lispro  0-9 Units Subcutaneous Nightly    cefTRIAXone (ROCEPHIN) IV  1 g Intravenous Q24H    insulin glargine  15 Units Subcutaneous BID    enoxaparin  30 mg Subcutaneous BID    diltiazem  360 mg Oral Daily     Continuous Infusions:    dextrose      dextrose 5 % and 0.45 % NaCl Stopped (08/08/19 0718)     PRN Meds: glucose, dextrose, glucagon (rDNA), dextrose, hydrALAZINE, potassium chloride **OR** potassium alternative oral replacement **OR** potassium chloride, cetirizine, dextrose 5 % and 0.45 % NaCl, magnesium sulfate, sodium phosphate IVPB **OR** sodium phosphate IVPB **OR** sodium phosphate IVPB    Data:     Past Medical History:   has a past medical history of Anesthesia, Cataracts, bilateral, Elevated PSA, Whitmore catheter in

## 2019-08-09 NOTE — PROGRESS NOTES
Physical Therapy  Facility/Department: [facility] PROGRESSIVE CARE  Daily Treatment Note  NAME: Melba Huffman  : 1955  MRN: [id number]    Date of Service: 2019    Discharge Recommendations:  Home independently        Assessment   Body structures, Functions, Activity limitations: Decreased functional mobility ; Decreased strength;Decreased balance  Assessment: Pt progressing well and is independent for all activities at this time. Recommend home indep. PT Education: Goals;PT Role;Plan of Care;General Safety;Home Exercise Program. Patient given written HEP with standing and seated exercises. REQUIRES PT FOLLOW UP: No  Activity Tolerance  Activity Tolerance: Patient Tolerated treatment well     Patient Diagnosis(es): The primary encounter diagnosis was New onset type 2 diabetes mellitus ([facility] [address] Diagnoses of Seasonal allergies, Kidney stone, Kidney problem, Essential hypertension, and CKD (chronic kidney disease) stage 3, GFR 30-59 ml/min ([facility]) were also pertinent to this visit. has a past medical history of Anesthesia, Cataracts, bilateral, Elevated PSA, Whitmore catheter in place, Hypertension, Kidney problem, Kidney stone, Seasonal allergies, Sepsis ([facility] [address] SVT (supraventricular tachycardia) ([facility] [address] and Wears glasses. has a past surgical history that includes Cystoscopy; Cystoscopy (2018); Prostate biopsy (2018); pr genital surg proc,male unlisted (N/A, 2018); pr cystourethroscopy (N/A, 2018); Varicocelectomy; Eye surgery; TURP (N/A, 2018); and suprapubic catheter placement (N/A, 2018). Restrictions  Restrictions/Precautions  Restrictions/Precautions: General Precautions, Fall Risk(Up with assist)  Position Activity Restriction  Other position/activity restrictions: IV, telemetry  Subjective   General  Chart Reviewed: Yes  Response To Previous Treatment: Patient with no complaints from previous session.   Family / Caregiver Present: No  Subjective  Subjective: Patient pleasant and

## 2021-09-24 ENCOUNTER — HOSPITAL ENCOUNTER (EMERGENCY)
Age: 66
Discharge: HOME | End: 2021-09-25
Attending: EMERGENCY MEDICINE
Payer: MEDICARE

## 2021-09-24 ENCOUNTER — APPOINTMENT (OUTPATIENT)
Dept: GENERAL RADIOLOGY | Age: 66
End: 2021-09-24
Payer: MEDICARE

## 2021-09-24 DIAGNOSIS — S42.91XA TRAUMATIC CLOSED DISPLACED FRACTURE OF RIGHT SHOULDER WITH ANTERIOR DISLOCATION, INITIAL ENCOUNTER: Primary | ICD-10-CM

## 2021-09-24 PROCEDURE — 2500000003 HC RX 250 WO HCPCS: Performed by: EMERGENCY MEDICINE

## 2021-09-24 PROCEDURE — 6360000002 HC RX W HCPCS: Performed by: EMERGENCY MEDICINE

## 2021-09-24 PROCEDURE — 73030 X-RAY EXAM OF SHOULDER: CPT

## 2021-09-24 PROCEDURE — 96374 THER/PROPH/DIAG INJ IV PUSH: CPT

## 2021-09-24 PROCEDURE — 6360000002 HC RX W HCPCS: Performed by: PHYSICIAN ASSISTANT

## 2021-09-24 PROCEDURE — 73020 X-RAY EXAM OF SHOULDER: CPT

## 2021-09-24 PROCEDURE — 99282 EMERGENCY DEPT VISIT SF MDM: CPT

## 2021-09-24 RX ORDER — DILTIAZEM HYDROCHLORIDE 180 MG/1
CAPSULE, COATED, EXTENDED RELEASE ORAL
COMMUNITY

## 2021-09-24 RX ORDER — HYDROMORPHONE HYDROCHLORIDE 1 MG/ML
1 INJECTION, SOLUTION INTRAMUSCULAR; INTRAVENOUS; SUBCUTANEOUS ONCE
Status: COMPLETED | OUTPATIENT
Start: 2021-09-24 | End: 2021-09-24

## 2021-09-24 RX ORDER — MORPHINE SULFATE 10 MG/ML
5 INJECTION, SOLUTION INTRAMUSCULAR; INTRAVENOUS ONCE
Status: COMPLETED | OUTPATIENT
Start: 2021-09-24 | End: 2021-09-24

## 2021-09-24 RX ORDER — GLIPIZIDE 2.5 MG/1
TABLET, EXTENDED RELEASE ORAL
COMMUNITY

## 2021-09-24 RX ORDER — HYDRALAZINE HYDROCHLORIDE 50 MG/1
TABLET, FILM COATED ORAL
COMMUNITY

## 2021-09-24 RX ORDER — LIDOCAINE HYDROCHLORIDE 10 MG/ML
5 INJECTION, SOLUTION INFILTRATION; PERINEURAL ONCE
Status: COMPLETED | OUTPATIENT
Start: 2021-09-24 | End: 2021-09-24

## 2021-09-24 RX ADMIN — MORPHINE SULFATE 5 MG: 10 INJECTION INTRAVENOUS at 22:40

## 2021-09-24 RX ADMIN — HYDROMORPHONE HYDROCHLORIDE 1 MG: 1 INJECTION, SOLUTION INTRAMUSCULAR; INTRAVENOUS; SUBCUTANEOUS at 22:30

## 2021-09-24 RX ADMIN — LIDOCAINE HYDROCHLORIDE 5 ML: 10 INJECTION, SOLUTION INFILTRATION; PERINEURAL at 22:40

## 2021-09-24 ASSESSMENT — ENCOUNTER SYMPTOMS
RHINORRHEA: 0
EYE PAIN: 0
VOMITING: 0
NAUSEA: 0
WHEEZING: 0
COUGH: 0
EYE DISCHARGE: 0
SHORTNESS OF BREATH: 0
EYE ITCHING: 0
BACK PAIN: 0
SORE THROAT: 0

## 2021-09-24 ASSESSMENT — PAIN SCALES - GENERAL
PAINLEVEL_OUTOF10: 6
PAINLEVEL_OUTOF10: 6

## 2021-09-25 VITALS
TEMPERATURE: 97.5 F | HEART RATE: 80 BPM | RESPIRATION RATE: 16 BRPM | BODY MASS INDEX: 34.61 KG/M2 | SYSTOLIC BLOOD PRESSURE: 168 MMHG | OXYGEN SATURATION: 96 % | WEIGHT: 255.5 LBS | HEIGHT: 72 IN | DIASTOLIC BLOOD PRESSURE: 110 MMHG

## 2021-09-25 PROCEDURE — 23650 CLTX SHO DSLC W/MNPJ WO ANES: CPT

## 2021-09-25 NOTE — ED PROVIDER NOTES
[address] [place] [address] ED  Emergency Department Encounter  Advanced Practice Provider     Pt Name: Janiya Marte  MRN: [id number]  Horacegftheron 1955  Date of evaluation: 9/24/21  PCP:  Evy Shaw MD    [address] [place] [address]       Chief Complaint   Patient presents with    Shoulder Injury       HISTORY OF PRESENT ILLNESS  (Location/Symptom, Timing/Onset,Context/Setting, Quality, Duration, Modifying Factors, Severity.)      Janiya Marte is a 77 y.o. male who presents with right shoulder pain. Patient states that just prior to arrival he was taking a walk when he tripped, did a \"walk stumble\" until he fell landing on the right shoulder. Patient states that he felt immediate pain to the shoulder. He immediately had reduced range of motion. He denies any history of previous injury. He has not taken anything for pain. He does have an abrasion on the left hand, denies any other injury. PAST MEDICAL /SURGICAL / SOCIAL / FAMILY HISTORY      has a past medical history of Anesthesia, Cataracts, bilateral, Elevated PSA, Whitmore catheter in place, Hypertension, Kidney problem, Kidney stone, Seasonal allergies, Sepsis ([address] SVT (supraventricular tachycardia) ([address] and Wears glasses. has a past surgical history that includes Cystoscopy; Cystoscopy (09/07/2018); Prostate biopsy (09/07/2018); pr genital surg proc,male unlisted (N/A, 9/7/2018); pr cystourethroscopy (N/A, 11/6/2018); Varicocelectomy; Eye surgery; TURP (N/A, 12/20/2018); and suprapubic catheter placement (N/A, 12/20/2018).     Social History     Socioeconomic History    Marital status:      Spouse name: Not on file    Number of children: Not on file    Years of education: Not on file    Highest education level: Not on file   Occupational History    Not on file   Tobacco Use    Smoking status: Never Smoker    Smokeless tobacco: Never Used   Substance and Sexual Activity    Alcohol use: Yes     Comment: occasionally    Drug use: No    Sexual activity: Not on file   Other Topics Concern    Not on file   Social History Narrative    Not on file     Social Determinants of Health     Financial Resource Strain:     Difficulty of Paying Living Expenses:    Food Insecurity:     Worried About Running Out of Food in the Last Year:     [address] in the Last Year:    Transportation Needs:     Lack of Transportation (Medical):  Lack of Transportation (Non-Medical):    Physical Activity:     Days of Exercise per Week:     Minutes of Exercise per Session:    Stress:     Feeling of Stress :    Social Connections:     Frequency of Communication with Friends and Family:     Frequency of Social Gatherings with Friends and Family:     Attends Congregational Services:     Active Member of Clubs or Organizations:     Attends Club or Organization Meetings:     Marital Status:    Intimate Partner Violence:     Fear of Current or Ex-Partner:     Emotionally Abused:     Physically Abused:     Sexually Abused:        Family History   Problem Relation Age of Onset    Hypertension Mother     Diabetes Father     Parkinsonism Father     Alzheimer's Disease Father     Alzheimer's Disease Maternal Grandmother     Cancer Maternal Grandfather     Cancer Paternal Grandmother     COPD Paternal Grandfather        Allergies:  Lisinopril    Home Medications:  Prior to Admission medications    Medication Sig Start Date End Date Taking?  Authorizing Provider   dilTIAZem (CARDIZEM CD) 180 MG extended release capsule 2 capsules   Yes Historical Provider, MD   glipiZIDE (GLUCOTROL XL) 2.5 MG extended release tablet take 1 tablet by mouth every morning WITH BREAKFAST   Yes Historical Provider, MD   hydrALAZINE (APRESOLINE) 50 MG tablet take 1 tablet by mouth three times a day with food   Yes Historical Provider, MD   metFORMIN HCl  MG/5ML SRER take 1 tablet by mouth every evening with meals   Yes Historical Provider, MD   insulin detemir (LEVEMIR FLEXTOUCH) 100 UNIT/ML injection pen Inject 18 Units into the skin 2 times daily 8/9/19  Yes Jamel Ybarra MD   Multiple Vitamins-Minerals (MULTIVITAMIN ADULT) TABS Take 1 tablet by mouth daily   Yes Historical Provider, MD   Insulin Pen Needle ([employer] PEN NEEDLES) 31G X 6 MM MISC 1 each by Does not apply route daily 8/9/19   Jamel Ybarra MD   cetirizine (ZYRTEC) 10 MG tablet Take 1 tablet by mouth daily  Patient taking differently: Take 10 mg by mouth daily as needed for Allergies or Rhinitis  10/30/18   Jamel Ybarra MD       patient's medication list has been reviewed as entered by the nursing staff. REVIEW OF SYSTEMS    (2-9 systems for level 4, 10 or more for level 5)      Review of Systems   Constitutional: Negative for chills and fever. HENT: Negative for ear pain, rhinorrhea and sore throat. Eyes: Negative for pain, discharge and itching. Respiratory: Negative for cough, shortness of breath and wheezing. Cardiovascular: Negative for chest pain and palpitations. Gastrointestinal: Negative for nausea and vomiting. Genitourinary: Negative for difficulty urinating, dysuria and hematuria. Musculoskeletal: Positive for arthralgias and myalgias. Negative for back pain. Skin: Negative for rash and wound. Neurological: Negative for dizziness and headaches. Psychiatric/Behavioral: Negative for dysphoric mood. PHYSICAL EXAM  (up to 7 for level 4, 8 or more for level 5)      INITIAL VITALS:  height is 6' (1.829 m) and weight is 255 lb 8 oz (115.9 kg). His oral temperature is 97.5 °F (36.4 °C). His blood pressure is 208/120 (abnormal) and his pulse is 80. His respiration is 16 and oxygen saturation is 96%. Physical Exam  Constitutional:       Appearance: He is well-developed. He is not diaphoretic. HENT:      Head: Normocephalic and atraumatic. Right Ear: External ear normal.      Left Ear: External ear normal.   Eyes:      General: No scleral icterus. Left eye: No discharge. reviewed the radiologist interpretations:  No results found. XR SHOULDER RIGHT (MIN 2 VIEWS)   Final Result   Acute anterior inferior right glenohumeral joint dislocation. No evident   fracture. LABS:  No results found for this visit on 09/24/21. CONSULTS:  None    PROCEDURES:  None    FINAL IMPRESSION      1. Traumatic closed displaced fracture of right shoulder with anterior dislocation, initial encounter          DISPOSITION / PLAN     DISPOSITION     PATIENT REFERRED TO:  No follow-up provider specified.     DISCHARGE MEDICATIONS:  New Prescriptions    No medications on file       Jaida Womack PA-C   Emergency Medicine Physician Assistant    (Please note that portions of this note were completed with a voice recognition program.  Efforts were made to edit thedictations but occasionally words are mis-transcribed.)        Jaida Womack PA-C  09/24/21 4005

## 2021-09-25 NOTE — ED PROVIDER NOTES
EMERGENCY DEPARTMENT ENCOUNTER    Pt Name: Liu Wilhelm  MRN: [id number]  [place] 1955  Date of evaluation: 9/25/21  CHIEF COMPLAINT       Chief Complaint   Patient presents with    Shoulder Injury     HISTORY OF PRESENT ILLNESS   Patient is a right-handed 78-year-old male who presents to the ED with right shoulder pain after trip and fall this evening. Patient went for a walk, lost his footing and fell onto his right shoulder. He also has superficial abrasion to right lower leg. No head strike, no LOC. No other issues at this time. No fevers, cough, shortness of breath, chest pain, abdominal pain, nausea, vomiting, changes in urine or stool. REVIEW OF SYSTEMS     Review of Systems   All other systems reviewed and are negative. PASTMEDICAL HISTORY     Past Medical History:   Diagnosis Date    Anesthesia     POSTOP NAUSEA AND VOMITING    Cataracts, bilateral     Elevated PSA     Ogden catheter in place     Hypertension     Kidney problem     STATES HAD DIALYSIS IN AUGUST 2018 AFTER HE COULDN'T URINATE BECAUSE OF HIS PROSTATE    Kidney stone     1990'S    Seasonal allergies     Sepsis ([address]     SVT (supraventricular tachycardia) ([address] 2018    DR. Ivan Zapata Wears glasses     DISTANCE     SURGICAL HISTORY       Past Surgical History:   Procedure Laterality Date    CYSTOSCOPY      CYSTOSCOPY  09/07/2018    with ogden exchange    EYE SURGERY      RADIOKERATOTOMY    DC CYSTOURETHROSCOPY N/A 11/6/2018    CYSTOSCOPY, REMOVAL AND REINSERTION OF CATHETAR performed by Anurag Madrigal MD at [address] N/A 9/7/2018    CYSTOSCOPY PROSTATE BIOPSY WITH ULTRASOUND performed by Anurag Madrigal MD at [address] [place] [address]  09/07/2018    [address] N/A 12/20/2018    SUPRAPUBIC CATHETER INSERTION performed by Tejal Fry MD at [address] [facility] TURP N/A 12/20/2018    CYSTOSCOPY, GREENLIGHT XPS LASER PROSTATE  Delariea Pane #[phone] CATERINA) performed by Orestes Armendariz Elvia Godoy MD at [address] [place] [address] VARICOCELECTOMY       CURRENT MEDICATIONS       Discharge Medication List as of 2021 12:21 AM      CONTINUE these medications which have NOT CHANGED    Details   dilTIAZem (CARDIZEM CD) 180 MG extended release capsule 2 capsulesHistorical Med      glipiZIDE (GLUCOTROL XL) 2.5 MG extended release tablet take 1 tablet by mouth every morning WITH BREAKFASTHistorical Med      hydrALAZINE (APRESOLINE) 50 MG tablet take 1 tablet by mouth three times a day with foodHistorical Med      metFORMIN HCl  MG/5ML SRER take 1 tablet by mouth every evening with mealsHistorical Med      insulin detemir (LEVEMIR FLEXTOUCH) 100 UNIT/ML injection pen Inject 18 Units into the skin 2 times daily, Disp-5 pen, R-1Normal      Multiple Vitamins-Minerals (MULTIVITAMIN ADULT) TABS Take 1 tablet by mouth dailyHistorical Med      Insulin Pen Needle ([employer] PEN NEEDLES) 31G X 6 MM MISC DAILY Starting 2019, Disp-100 each, R-3, Normal      cetirizine (ZYRTEC) 10 MG tablet Take 1 tablet by mouth daily, Disp-10 tablet, R-0Normal           ALLERGIES     is allergic to lisinopril. FAMILY HISTORY     He indicated that the status of his mother is unknown. He indicated that the status of his father is unknown. He indicated that his maternal grandmother is . He indicated that his maternal grandfather is . He indicated that his paternal grandmother is . He indicated that his paternal grandfather is . SOCIAL HISTORY       Social History     Tobacco Use    Smoking status: Never Smoker    Smokeless tobacco: Never Used   Substance Use Topics    Alcohol use: Yes     Comment: occasionally    Drug use: No     PHYSICAL EXAM     INITIAL VITALS: BP (!) 168/110   Pulse 80   Temp 97.5 °F (36.4 °C) (Oral)   Resp 16   Ht 6' (1.829 m)   Wt 255 lb 8 oz (115.9 kg)   SpO2 96%   BMI 34.65 kg/m²    Physical Exam  HENT:      Head: Normocephalic.       Right Ear: External ear normal. Left Ear: External ear normal.      Nose: Nose normal.   Eyes:      Conjunctiva/sclera: Conjunctivae normal.   Cardiovascular:      Rate and Rhythm: Normal rate. Pulmonary:      Effort: Pulmonary effort is normal.   Abdominal:      General: Abdomen is flat. Musculoskeletal:      Comments: Tenderness, bony deformity right anterior shoulder. Skin:     General: Skin is dry. Neurological:      Mental Status: He is alert. Mental status is at baseline. Psychiatric:         Mood and Affect: Mood normal.         Behavior: Behavior normal.         MEDICAL DECISION MAKING:   The patient is hemodynamically stable, afebrile, nontoxic-appearing. Physical exam notable for tenderness, bony deformity right anterior shoulder. Based on history and exam likely dislocation. ED plan for x-ray, reassess. Ortho Injury    Date/Time: 9/25/2021 12:22 AM  Performed by: Silvestre Germain MD  Authorized by: Silvestre Germain MD   Consent: Verbal consent obtained.   Risks and benefits: risks, benefits and alternatives were discussed  Consent given by: patient  Patient understanding: patient states understanding of the procedure being performed  Patient consent: the patient's understanding of the procedure matches consent given  Procedure consent: procedure consent matches procedure scheduled  Relevant documents: relevant documents present and verified  Imaging studies: imaging studies available  Required items: required blood products, implants, devices, and special equipment available  Patient identity confirmed: verbally with patient, arm band and hospital-assigned identification number  Injury location: shoulder  Location details: right shoulder  Injury type: dislocation  Dislocation type: anterior  Hill-Sachs deformity: no  Chronicity: new  Pre-procedure neurovascular assessment: neurovascularly intact  Pre-procedure distal perfusion: normal  Pre-procedure neurological function: normal  Pre-procedure range of motion: normal  Anesthesia: local infiltration    Anesthesia:  Local anesthesia used: yes  Local Anesthetic: lidocaine 1% without epinephrine  Anesthetic total: 5 mL    Sedation:  Patient sedated: no    Manipulation performed: yes  Reduction method: traction and counter traction and external rotation  Immobilization: splint  Post-procedure neurovascular assessment: post-procedure neurovascularly intact  Post-procedure distal perfusion: normal  Post-procedure neurological function: normal  Patient tolerance: patient tolerated the procedure well with no immediate complications          DIAGNOSTIC RESULTS   EKG:All EKG's are interpreted by the Emergency Department Physician who either signs or Co-signs this chart in the absence of a cardiologist.        RADIOLOGY:All plain film, CT, MRI, and formal ultrasound images (except ED bedside ultrasound) are read by the radiologist, see reports below, unless otherwisenoted in MDM or here. XR SHOULDER RIGHT 1 VW   Final Result   Interval reduction of the anterior shoulder dislocation. XR SHOULDER RIGHT (MIN 2 VIEWS)   Final Result   Acute anterior inferior right glenohumeral joint dislocation. No evident   fracture. LABS: All lab results were reviewed by myself, and all abnormals are listed below. Labs Reviewed - No data to display    EMERGENCY DEPARTMENTCOURSE:   Patient did well in the ED. Shoulder reduction per note above. Given Ortho referral.  No further work-up indicated at this time. Nursing notes reviewed. At this time this is what I find, the patient appears well and does not appear sick or toxic. I gave my usual and customary discussion of the risks and benefits of discharge versus admission. I answered the patient's questions. I gave the patient strict return precautions. Patient expressed understanding of the discharge instructions. Dictated but not reviewed.       Vitals:    Vitals:    09/24/21 2205 09/25/21 0034   BP: (!) 208/120 (!)

## 2021-09-28 ENCOUNTER — OFFICE VISIT (OUTPATIENT)
Dept: ORTHOPEDIC SURGERY | Age: 66
End: 2021-09-28
Payer: MEDICARE

## 2021-09-28 VITALS — BODY MASS INDEX: 34.54 KG/M2 | TEMPERATURE: 98 F | HEIGHT: 72 IN | WEIGHT: 255 LBS | RESPIRATION RATE: 12 BRPM

## 2021-09-28 DIAGNOSIS — S43.004A DISLOCATION OF RIGHT SHOULDER JOINT, INITIAL ENCOUNTER: Primary | ICD-10-CM

## 2021-09-28 DIAGNOSIS — M25.511 ACUTE PAIN OF RIGHT SHOULDER: Primary | ICD-10-CM

## 2021-09-28 DIAGNOSIS — S46.011A TRAUMATIC TEAR OF RIGHT ROTATOR CUFF, UNSPECIFIED TEAR EXTENT, INITIAL ENCOUNTER: ICD-10-CM

## 2021-09-28 PROCEDURE — 99204 OFFICE O/P NEW MOD 45 MIN: CPT | Performed by: ORTHOPAEDIC SURGERY

## 2021-09-28 NOTE — PROGRESS NOTES
[facility]  [facility] Gaby Forman  [address] [place] [address]  Dept: [phone]    Ambulatory Orthopedic Consult      CHIEF COMPLAINT:    Chief Complaint   Patient presents with    Shoulder Pain     right       HISTORY OF PRESENT ILLNESS:      The patient is a right hand dominant 77 y.o. male who is being seen for evaluation of the above, which began on 9/24/2021 secondary to a fall from standing height  . At today's visit, he is using no brace/assistive device. History is obtained today from:   [x]  the patient     [x]  EMR     []  one family member/friend    []  multiple family members/friends    []  other:      He sustained a shoulder dislocation, which was reduced in the ER. He reports that this is his first shoulder dislocation. He denies any numbness/tingling. REVIEW OF SYSTEMS:  Musculoskeletal: See HPI for pertinent positives     Past Medical History:    He  has a past medical history of Anesthesia, Cataracts, bilateral, Elevated PSA, Whitmore catheter in place, Hypertension, Kidney problem, Kidney stone, Seasonal allergies, Sepsis ([address] SVT (supraventricular tachycardia) [address] (2018), and Wears glasses. Past Surgical History:    He  has a past surgical history that includes Cystoscopy; Cystoscopy (09/07/2018); Prostate biopsy (09/07/2018); pr genital surg proc,male unlisted (N/A, 9/7/2018); pr cystourethroscopy (N/A, 11/6/2018); Varicocelectomy; Eye surgery; TURP (N/A, 12/20/2018); and suprapubic catheter placement (N/A, 12/20/2018).      Current Medications:     Current Outpatient Medications:     diclofenac sodium (VOLTAREN) 1 % GEL, Apply 2 g topically 4 times daily as needed for Pain, Disp: 2 g, Rfl: 0    dilTIAZem (CARDIZEM CD) 180 MG extended release capsule, 2 capsules, Disp: , Rfl:     glipiZIDE (GLUCOTROL XL) 2.5 MG extended release tablet, take 1 tablet by mouth every morning WITH BREAKFAST, Disp: , Rfl:     hydrALAZINE (APRESOLINE) 50 MG tablet, take 1 tablet by mouth three times a day with food, Disp: , Rfl:     metFORMIN HCl  MG/5ML SRER, take 1 tablet by mouth every evening with meals, Disp: , Rfl:     insulin detemir (LEVEMIR FLEXTOUCH) 100 UNIT/ML injection pen, Inject 18 Units into the skin 2 times daily, Disp: 5 pen, Rfl: 1    Insulin Pen Needle (KROGER PEN NEEDLES) 31G X 6 MM MISC, 1 each by Does not apply route daily, Disp: 100 each, Rfl: 3    cetirizine (ZYRTEC) 10 MG tablet, Take 1 tablet by mouth daily (Patient taking differently: Take 10 mg by mouth daily as needed for Allergies or Rhinitis ), Disp: 10 tablet, Rfl: 0    Multiple Vitamins-Minerals (MULTIVITAMIN ADULT) TABS, Take 1 tablet by mouth daily, Disp: , Rfl:      Allergies:    Lisinopril    Family History:  family history includes Alzheimer's Disease in his father and maternal grandmother; COPD in his paternal grandfather; Cancer in his maternal grandfather and paternal grandmother; Diabetes in his father; Hypertension in his mother; Parkinsonism in his father. Social History:   Social History     Occupational History    Not on file   Tobacco Use    Smoking status: Never Smoker    Smokeless tobacco: Never Used   Substance and Sexual Activity    Alcohol use: Yes     Comment: occasionally    Drug use: No    Sexual activity: Not on file     Occupation: Self-employed /consultant full-time     OBJECTIVE:  Temp 98 °F (36.7 °C)   Resp 12   Ht 6' (1.829 m)   Wt 255 lb (115.7 kg)   BMI 34.58 kg/m²    Psych: alert and oriented to person, time, and place  Cardio:  well perfused extremities, no cyanosis  Resp:  normal respiratory effort  Musculoskeletal:    Affected upper extremity:    Vascular: Limb well perfused, compartments soft/compressible. Skin: No erythema/ulcers. Intact.    Neurovascular Status:  Grossly neurovascularly intact throughout  Motion:  Grossly able to fire major muscle groups with appropriate/expected AROM  Tenderness to Palpation:  shoulder diffusely   -Weakness/pain with shoulder elevation, decreased shoulder elevation      RADIOLOGY:   9/28/2021 FINDINGS: Four views (AP, Grashley, Scapular Y, and Axial) of the right shoulder were obtained in the office today and reviewed, revealing no acute fracture, dislocation, or radioopaque foreign body/tumor. IMPRESSION: No acute fracture/dislocation. Electronically signed by Merissa Armendariz MD      Relevant previous imaging reviewed, both imaging and report(s) as below:    XR SHOULDER RIGHT 1 VW    Result Date: 9/24/2021  Interval reduction of the anterior shoulder dislocation. XR SHOULDER RIGHT (MIN 2 VIEWS)    Result Date: 9/24/2021  Acute anterior inferior right glenohumeral joint dislocation. No evident fracture. ASSESSMENT AND PLAN:  Body mass index is 34.58 kg/m². He has a right anterior shoulder dislocation with a possible traumatic rotator cuff tear, sustained on 9/24/2021. Notably, he has a somewhat complex past medical history. He has a history of incomplete right bundle branch block and supraventricular tachycardias, chronic kidney disease (stage III), and type 2 diabetes. We had a discussion today about the likely diagnosis and its natural history, physical exam and imaging findings, as well as various treatment options in detail. Surgically, we discussed the possibility of a future surgery, depending on his clinical course. We did discuss the possibility of a traumatic rotator cuff tear, and we decided to proceed with conservative management at this time, as he does report that his range of motion and pain have been improving since the initial injury. Orders/referrals were placed as below at today's visit. The patient will avoid pain provoking activity. The patient was referred to physical therapy for his right shoulder to work on range of motion and strengthening.  I provided a prescription for Voltaren (4g TOPL q QID PRN pain). I recommend this over the use of oral NSAIDs because of his underlying history of kidney disease. All questions were answered and the above plan was agreed upon. The patient will return to clinic in 1 month without x-rays. At his next visit, depending on how he is doing, we may consider an MRI to evaluate his rotator cuff integrity. At the patient's next visit, depending on how the patient is doing and/or new imaging/labs results, we may consider the following options:    []  Lace up ankle     []  CAM boot         []  removable wrist brace     []  PT:        []  Wean out immobilization         []  Adv activity      []  Footmind        []  Spenco       []  Custom Orthotic:               []  AZ brace                    []  Rocker Bottom      []  Night splint    []  Heel cups        []  Strap        []  Toe gizmos    []  Topl        []  NSAIDs         []  Alejandra        []  Ref:         []  Stress Xray    []  CT        []  MRI  []  Inj:          []  Consider OR      []  Pick OR date    No follow-ups on file. No orders of the defined types were placed in this encounter. Orders Placed This Encounter   Procedures    Ambulatory referral to Physical Therapy     Referral Priority:   Routine     Referral Type:   Eval and Treat     Referral Reason:   Specialty Services Required     Requested Specialty:   Physical Therapy     Number of Visits Requested:   1         Rupa Penaloza MD  Orthopedic Surgery        Please excuse any typos/errors, as this note was created with the assistance of voice recognition software. While intending to generate a document that actually reflects the content of the visit, the document can still have some errors including those of syntax and sound-a-like substitutions which may escape proof reading. In such instances, actual meaning can be extrapolated by context.

## 2021-09-28 NOTE — LETTER
[address] [place] [address] and Sports Medicine  [address] [address]  Phone: [phone]  Fax: [phone]    Sharri Alexander MD    September 29, 2021     Primo Marina, Manolo [address] [facility] [place] [address]    Patient: Marie Amato   MR Number: [id number]   YOB: 1955   Date of Visit: 9/28/2021       Dear Primo Degree: Thank you for referring Marie Amato to me for evaluation/treatment. Below are the relevant portions of my assessment and plan of care. He has a right anterior shoulder dislocation with a possible traumatic rotator cuff tear, sustained on 9/24/2021. Notably, he has a somewhat complex past medical history. He has a history of incomplete right bundle branch block and supraventricular tachycardias, chronic kidney disease (stage III), and type 2 diabetes. We had a discussion today about the likely diagnosis and its natural history, physical exam and imaging findings, as well as various treatment options in detail. Surgically, we discussed the possibility of a future surgery, depending on his clinical course. We did discuss the possibility of a traumatic rotator cuff tear, and we decided to proceed with conservative management at this time, as he does report that his range of motion and pain have been improving since the initial injury. Orders/referrals were placed as below at today's visit. The patient will avoid pain provoking activity. The patient was referred to physical therapy for his right shoulder to work on range of motion and strengthening. I provided a prescription for Voltaren (4g TOPL q QID PRN pain). I recommend this over the use of oral NSAIDs because of his underlying history of kidney disease. All questions were answered and the above plan was agreed upon. The patient will return to clinic in 1 month without x-rays.   At his next visit, depending on how he is doing, we may consider an MRI to evaluate his rotator cuff integrity. If you have questions, please do not hesitate to call me. I look forward to following [place] along with you.     Sincerely,      Kelechi Liz MD

## 2021-10-12 ENCOUNTER — HOSPITAL ENCOUNTER (OUTPATIENT)
Dept: PHYSICAL THERAPY | Facility: CLINIC | Age: 66
Setting detail: THERAPIES SERIES
Discharge: HOME | End: 2021-10-12
Payer: MEDICARE

## 2021-10-12 PROCEDURE — 97110 THERAPEUTIC EXERCISES: CPT

## 2021-10-12 PROCEDURE — 97161 PT EVAL LOW COMPLEX 20 MIN: CPT

## 2021-10-12 NOTE — FLOWSHEET NOTE
[facility] Fall Risk Assessment    Patient Name:  Denice Smith  : 1955        Risk Factor Scale  Score   History of Falls [] Yes  [x] No - 1 d/t tripping in dark 25  0 0   Secondary Diagnosis [] Yes  [x] No 15  0 0   Ambulatory Aid [] Furniture  [] Crutches/cane/walker  [x] None/bedrest/wheelchair/nurse 30  15  0 0   IV/Heparin Lock [] Yes  [x] No 20  0 0   Gait/Transferring [] Impaired  [] Weak  [x] Normal/bedrest/immobile 20  10  0 0   Mental Status [] Forgets limitations  [x] Oriented to own ability 15  0 0      Total: 0     Based on the Assessment score: check the appropriate box.     [x]  No intervention needed   Low =   Score of 0-24    []  Use standard prevention interventions Moderate =  Score of 24-44   [] Give patient handout and discuss fall prevention strategies   [] Establish goal of education for patient/family RE: fall prevention strategies    []  Use high risk prevention interventions High = Score of 45 and higher   [] Give patient handout and discuss fall prevention strategies   [] Establish goal of education for patient/family Re: fall prevention strategies   [] Discuss lifeline / other resources    Electronically signed by:   Thuy Morales PT  Date: 10/12/2021

## 2021-10-12 NOTE — PLAN OF CARE
[] [facility]) [facility] &  Therapy  [address]  [phone]  F: [phone] [x] [address]  [address]   [address]  P: [phone]  F: [phone] [] [address] &  Therapy  [address]  P: [phone]  F: [phone] [] [address]  P: [phone]  F: [phone] [] [address]  [facility]   [address]   [place]: [phone]  F: [phone]        Physical Therapy Plan of Care    Date:  10/12/2021  Patient: Ryland Millard  : 1955  MRN: [id number]  Physician: Dr. Gladys Brooks MD                                   Insurance: [facility]  Medical Diagnosis: S42.80A - Dislocation of right shoulder joint; S46.011A - Traumatic tear of right rotator cuff  Rehab Codes: M25.511, M62.81  Onset date: 2021                       Next Dr's appt.: 10/26/2021    Subjective:   CC: R shoulder pain  HPI: Pt reports that he went on a walk in the dark on 2021 and he tripped and fell onto his outstretched arm. Pt reports that he got up and his arm was hanging down at his side. Pt reports that when he got home he called his sister whom took him to [address] ER. The ER reported that his arm was dislocated and then pt followed up with Dr. Rozina Croft. Pt reports improvement in ROM since seeing Dr. Rozina Croft. Pt reports that he can feel pain with overhead mobility but he has instances of not noticing pain with ADLs. Pt denies history of previous injuries to R shoulder.      PMHx:[x]? Diabetes     [x]? HTN              [x]? MI/Heart Problems (hx of SVT)              [x]? Other: Kidney disease              [x]?  Refer to full medical chart  In EPIC       Assessment:  Mesfin Tobar is a 77 y.o. male presenting with physical therapy signs and symptoms consistent after sustaining an anterior R shoulder dislocation due to a fall while walking in the dark on 9/24/2021. Pt reports global improvement in mobility, strength and tolerance to ADLs steadily over the past few days. Pt demonstrates global deficits in ROM with overhead activities and mild strength impairments. Pt with increased difficulty with overhead tasks and lifting. Patient would benefit from skilled physical therapy services in order to: restore ROM and strength in order to improve tolerance to overhead tasks and lifting to continue independent ADLs.    Problems:    [x]? ? Pain: R shoulder, 0-3/10  [x]? ? ROM: impaired R shoulder ROM  [x]? ? Strength: global R UE weakness  [x]? ? Function: UEFS = 16% functional impairment  []? Other:              STG: (to be met in 6 treatments)  1. ? Pain: Pt will demonstrate R shoulder pain of <3/10 with progression of strengthening exercises. 2. ? ROM: Pt will demonstrate full R shoulder AROM when compared to the L shoulder in order to improve tolerance to overhead activities. 3. Patient to be independent with home exercise program as demonstrated by performance with correct form without cues. LTG: (to be met in 12 treatments)  1. Pt will report R shoulder pain of 1/10 with repetitive overhead and lifting tasks. 2. Pt will increase R UE strength to 5/5 globally in order to improve tolerance to lifting and carrying.   3. Pt will demonstrate improved functional activity tolerance as evident by an improved score on the UEFS to <10% functional impairment.                      Patient goals: \"regain normal range of movement, shoulder strength\"    Treatment Plan:  [x] Therapeutic Exercise   89997  [] Iontophoresis: 4 mg/mL Dexamethasone Sodium Phosphate  mAmin  09666   [] Therapeutic Activity  41377 [x] Vasopneumatic cold with compression  82276    [] Gait Training   10782 [] Ultrasound   [id number]   [x] Neuromuscular Re-education  54101 [] Electrical Stimulation Unattended  67300   [x] Manual Therapy  17273 [] Electrical Stimulation Attended  [phone] 38 97   [x] Instruction in HEP  [] Lumbar/Cervical Traction  [id number]   [] Aquatic Therapy   [id number] [x] Cold/hotpack    [] Massage   [id number]      [] Dry Needling, 1 or 2 muscles  06113   [] Biofeedback, first 15 minutes   64448  [] Biofeedback, additional 15 minutes   89420 [] Dry Needling, 3 or more muscles  74190     []  Medication allergies reviewed for use of    Dexamethasone Sodium Phosphate 4mg/ml     with iontophoresis treatments. Pt is not allergic. Frequency:  2 x/week for 12 visits    Electronically signed by: Raphael Oliva PT        Physician Signature:________________________________Date:__________________  By signing above or cosigning this note, I have reviewed this plan of care and certify a need for medically necessary rehabilitation services.      *PLEASE SIGN ABOVE AND FAX BACK ALL PAGES*

## 2021-10-12 NOTE — CONSULTS
[x] [address]  [facility]   [address]  P: [phone]  F: [phone] [] [address] [facility]  [address] [facility]   [address]  P: [phone]  F: [phone]       Physical Therapy Upper Extremity Extremity Evaluation    Date:  10/12/2021  Patient: Mariah Vasquez  : 1955  MRN: [id number]  Physician: Dr. Chris Moon MD   Insurance: [facility]  Medical Diagnosis: S42.80A - Dislocation of right shoulder joint; S46.011A - Traumatic tear of right rotator cuff  Rehab Codes: M25.511, M62.81  Onset date: 2021  Next Dr's appt.: 10/26/2021    Subjective:   CC: R shoulder pain  HPI: Pt reports that he went on a walk in the dark on 2021 and he tripped and fell onto his outstretched arm. Pt reports that he got up and his arm was hanging down at his side. Pt reports that when he got home he called his sister whom took him to [address] ER. The ER reported that his arm was dislocated and then pt followed up with Dr. Gerald Villarreal. Pt reports improvement in ROM since seeing Dr. Gerald Villarreal. Pt reports that he can feel pain with overhead mobility but he has instances of not noticing pain with ADLs. Pt denies history of previous injuries to R shoulder.      PMHx:[x] Diabetes  [x] HTN   [x] MI/Heart Problems (hx of SVT)   [x] Other: Kidney disease              [x] Refer to full medical chart  In EPIC       Comorbidities:   [x] Obesity [] Dialysis  [] N/A   [] Asthma/COPD [] Dementia [] Other:   [] Stroke [] Sleep apnea [] Other:   [] Vascular disease [] Rheumatic disease [] Other:     Tests: [x] X-Ray: [] MRI:  [] Other:    Medications: [x] Refer to full medical record  Allergies: [x] None    Function:  Hand Dominance  [x] Right    Patient lives with Kids 2-3 days a week   Home type 2 story house w/ basement   Stairs from outside 2   Stairs inside Flight upstairs and basement   Bathroom set up    [employer] Self employed   Job status /Consultant   Work Activities/duties     Recreational Activities walking, reading, kayaking, biking     ADL/IADL Previous level of function Current level of function Who currently assists the patient with task   Bathing  [x] Independent  [] Assist [x] Independent  [] Assist    Dress/grooming [x] Independent  [] Assist [x] Independent  [] Assist    Transfer/mobility [x] Independent  [] Assist [x] Independent  [] Assist    Feeding [x] Independent  [] Assist [x] Independent  [] Assist    Toileting [x] Independent  [] Assist [x] Independent  [] Assist    Driving [x] Independent  [] Assist [x] Independent  [] Assist    Housekeeping [x] Independent  [] Assist [x] Modified Independent  [] Assist Modifying with increase use of L UE with heavier tasks   Grocery shop/meal prep [x] Independent  [] Assist [x] Independent  [] Assist      Gait Prior level of function Current level of function    [x] Independent  [] Assist [x] Independent  [] Assist   Device: [x] Independent [x] Independent       Pain present? None at rest   Location R shoulder   Pain Rating currently 0/10   Pain altered treatment N/A   Pain at worse 2-3/10   Pain at best 0/10   Description of pain intermittent   Altered Sensation N/A   What makes it worse Lifting tea kettle and heavy objects, pushing up, overhead movements   What makes it better Medication, topical cream   Symptom progression Improving   Sleep Not disturbed           Objective:       ROM  °A/P END FEEL STRENGTH TESTS (+/-) Left Right Not Tested    Left Right  Left Right Drop Arm   []   Sit Shld Flex 162 100  5 4 Sulcus Sign   []   Sit Shld Abd 152 96  5 4- Apprehension   []   Sit Shld IR T6 L2    Yergasons   []   Shoulder Flex      Speeds   []   Ext      Neer   []   ABD      Yañez    []   ER @ 0  65 70  5 4+ Painful Arc   []   IR    5 4 Tinel   []   Elbow Flex.     5 4+       Elbow Ext.    5 4+       Pronation            Supination            Wrist Flex.            Wrist Ext. Rad. Dev. Ulnar Dev.                 OBSERVATION No Deficit Deficit Not Tested Comments   Forward Head [x] [] []    Rounded Shoulders [] [x] []    Kyphosis [] [x] []    Scap Height/Position [] [x] []    Winging [] [x] []    SH Rhythm [] [x] []    INSPECTION/PALPATION       SC/AC Joint [x] [] []    Supraspinatus [x] [] []    Biceps tendon/groove [x] [] []    Posterior shld [x] [] []    Subscapularis [x] [] []    NEUROLOGICAL       Cervical ROM/Quadrant [x] [] [] Full cervical ROM   Reflexes [x] [] []    Compression/Distraction [x] [] []    Sensation [x] [] []      Functional Test: Upper Extremity Functional Scale (UEFS) Score: 16% functionally impaired     Comments:    Assessment:  Yovanny Gamboa is a 77 y.o. male presenting with physical therapy signs and symptoms consistent after sustaining an anterior R shoulder dislocation due to a fall while walking in the dark on 9/24/2021. Pt reports global improvement in mobility, strength and tolerance to ADLs steadily over the past few days. Pt demonstrates global deficits in ROM with overhead activities and mild strength impairments. Pt with increased difficulty with overhead tasks and lifting. Patient would benefit from skilled physical therapy services in order to: restore ROM and strength in order to improve tolerance to overhead tasks and lifting to continue independent ADLs. Problems:    [x] ? Pain: R shoulder, 0-3/10  [x] ? ROM: impaired R shoulder ROM  [x] ? Strength: global R UE weakness  [x] ? Function: UEFS = 16% functional impairment  [] Other:       STG: (to be met in 6 treatments)  1. ? Pain: Pt will demonstrate R shoulder pain of <3/10 with progression of strengthening exercises. 2. ? ROM: Pt will demonstrate full R shoulder AROM when compared to the L shoulder in order to improve tolerance to overhead activities.   3. Patient to be independent with home exercise program as demonstrated by performance with correct form without cues. LTG: (to be met in 12 treatments)  1. Pt will report R shoulder pain of 1/10 with repetitive overhead and lifting tasks. 2. Pt will increase R UE strength to 5/5 globally in order to improve tolerance to lifting and carrying. 3. Pt will demonstrate improved functional activity tolerance as evident by an improved score on the UEFS to <10% functional impairment. Patient goals: \"regain normal range of movement, shoulder strength\"    Rehab Potential:  [x] Good  [] Fair  [] Poor   Suggested Professional Referral:  [x] No  [] Yes:  Barriers to Goal Achievement:  [x] No  [] Yes:  Domestic Concerns:  [x] No  [] Yes:    Pt. Education:  [x] Plans/Goals, Risks/Benefits discussed  [x] Home exercise program    Method of Education: [x] Verbal  [x] Demo  [x] Written  10/12/2021 HEP for charted exercises; [employer]: [id number]  Comprehension of Education:  [x] Verbalizes understanding. [x] Demonstrates understanding. [x] Needs Review. [] Demonstrates/verbalizes understanding of HEP/Ed previously given. Treatment Plan:  [x] Therapeutic Exercise   55308  [] Iontophoresis: 4 mg/mL Dexamethasone Sodium Phosphate  mAmin  50755   [] Therapeutic Activity  70789 [x] Vasopneumatic cold with compression  53023    [] Gait Training   81312 [] Ultrasound   50076   [x] Neuromuscular Re-education  85090 [] Electrical Stimulation Unattended  00252   [x] Manual Therapy  78521 [] Electrical Stimulation Attended  82208   [x] Instruction in HEP  [] Lumbar/Cervical Traction  24712   [] Aquatic Therapy   00993 [x] Cold/hotpack    [] Massage   02374      [] Dry Needling, 1 or 2 muscles  55759   [] Biofeedback, first 15 minutes   99736  [] Biofeedback, additional 15 minutes   02025 [] Dry Needling, 3 or more muscles  88406     []  Medication allergies reviewed for use of    Dexamethasone Sodium Phosphate 4mg/ml     with iontophoresis treatments.    Pt is not allergic. Frequency:  2 x/week for 12 visits        Todays Treatment:  Modalities:   Precautions: R shoulder anterior dislocation 9/24/2021  Exercises:  Exercise Reps/ Time Weight/ Level Comments   PROM 6 min  Flex, ABD   Cane AAROM 20x3\" ea  Flex, ABD   Wall slides 20x3\" ea  Flex, ABD               Other:     Specific Instructions for next treatment: R shoulder ROM and strengthening, modalities as needed for pain control      Evaluation Complexity:  History (Personal factors, comorbidities) [] 0 [] 1-2 [x] 3+   Exam (limitations, restrictions) [x] 1-2 [] 3 [] 4+   Clinical presentation (progression) [x] Stable [] Evolving  [] Unstable   Decision Making [x] Low [] Moderate [] High    [x] Low Complexity [] Moderate Complexity [] High Complexity       Treatment Charges: Mins Units   [x] Evaluation       [x]  Low       []  Moderate       []  High 35 1   []  Modalities     [x]  Ther Exercise 15 1   []  Manual Therapy     []  Ther Activities     []  Aquatics     []  Vasocompression     []  Other     Estimated Medicare cost as of 10/12/2021:  $120.85  TOTAL TREATMENT TIME: 50    Time in: 9:00 am   Time Out: 9:50 am    Electronically signed by: Mora Lux PT        Physician Signature:________________________________Date:__________________  By signing above or cosigning this note, I have reviewed this plan of care and certify a need for medically necessary rehabilitation services.      *PLEASE SIGN ABOVE AND FAX BACK ALL PAGES*

## 2021-10-15 ENCOUNTER — HOSPITAL ENCOUNTER (OUTPATIENT)
Dept: PHYSICAL THERAPY | Facility: CLINIC | Age: 66
Setting detail: THERAPIES SERIES
Discharge: HOME | End: 2021-10-15
Payer: MEDICARE

## 2021-10-15 PROCEDURE — 97110 THERAPEUTIC EXERCISES: CPT

## 2021-10-15 NOTE — FLOWSHEET NOTE
[] [facility]) [facility] CENTER &  Therapy  [address]  [phone]  F: [phone] [x] [address]  [facility] [address]   [address]  P: [phone]  F: [phone] [] [address]  [address]  P: [phone]  F: [phone] [] [address]  P: [phone]  F: [phone] [] [address]  [facility]   [address]   [place]: [phone]  F: [phone]      Physical Therapy Daily Treatment Note    Date:  10/15/2021  Patient Name:  Safia Coughlin    :  1955  MRN: [id number]  Physician: Dr. Kavita Novoa MD                                   Insurance: [employer]  Medical Diagnosis: S42.80A - Dislocation of right shoulder joint; S46.011A - Traumatic tear of right rotator cuff  Rehab Codes: M25.511, M62.81  Onset date: 2021                       Next 's appt.: 10/26/2021  Visit# / total visits:      Cancels/No Shows: 0/0    Subjective:    Pain:  [] Yes  [x] No Location: R shoulder Pain Rating: (0-10 scale) 0/10  Pain altered Tx:  [x] No  [] Yes  Action:  Comments: Pt arrives without complaints of R shoulder pain at rest. He reports that it's feeling better and better each day. Pt reports that he is most challenged with abduction.      Objective:  Modalities:   Precautions: R shoulder anterior dislocation 2021  Exercises:  Exercise Reps/ Time Weight/ Level Comments   Pulleys 2'/2'  Flex, scap         PROM 12 min   Flex, ABD   Cane AAROM 20x3\" ea   Flex, ABD               Wall slides 20x3\" ea   Flex, ABD   Wall isometrics  10x5\" ea   Flex, abd, add, IR, ER   Bicep curls  2x10 2#     Other:    Specific Instructions for next treatment: R shoulder ROM and strengthening, modalities as needed for pain control       Treatment Charges: Mins Units   []  Modalities     [x]  Ther Exercise 41 3   []  Manual Therapy     []  Ther Activities     []  Aquatics     []  Vasocompression     []  Other     Total Treatment time 41 3   Estimated Medicare cost as of 10/12/2021:  $195.74    Assessment: [x] Progressing toward goals. Exercises performed with continued focus on restoring R shoulder ROM and strength. Pt requires moderate cues for proper demonstration of exercises with fair carry over. Pt with good tolerance to today's treatment session. Pt deferred need for ice at end of today's treatment. [] No change. [] Other:  [x] Patient would continue to benefit from skilled physical therapy services in order to: restore ROM and strength in order to improve tolerance to overhead tasks and lifting to continue independent ADLs. STG: (to be met in 6 treatments)  1. ? Pain: Pt will demonstrate R shoulder pain of <3/10 with progression of strengthening exercises. 2. ? ROM: Pt will demonstrate full R shoulder AROM when compared to the L shoulder in order to improve tolerance to overhead activities. 3. Patient to be independent with home exercise program as demonstrated by performance with correct form without cues. LTG: (to be met in 12 treatments)  1. Pt will report R shoulder pain of 1/10 with repetitive overhead and lifting tasks. 2. Pt will increase R UE strength to 5/5 globally in order to improve tolerance to lifting and carrying. 3. Pt will demonstrate improved functional activity tolerance as evident by an improved score on the UEFS to <10% functional impairment.                      Patient goals: \"regain normal range of movement, shoulder strength\"    Pt. Education:  [x] Yes  [] No  [x] Reviewed Prior HEP/Ed  Method of Education: [] Verbal  [x] Demo  [] Written  10/12/2021 HEP for charted exercises; [employer]: [id number]  Comprehension of Education:  [] Verbalizes understanding.   [] Demonstrates understanding. [x] Needs review. [x] Demonstrates/verbalizes HEP/Ed previously given. Plan: [x] Continue current frequency toward long and short term goals.     [] Specific Instructions for subsequent treatments:       Time In: 11:26 am            Time Out: 12:09 am    Electronically signed by:  Tory Lara PT

## 2021-10-19 ENCOUNTER — HOSPITAL ENCOUNTER (OUTPATIENT)
Dept: PHYSICAL THERAPY | Facility: CLINIC | Age: 66
Setting detail: THERAPIES SERIES
Discharge: HOME | End: 2021-10-19
Payer: MEDICARE

## 2021-10-19 PROCEDURE — 97110 THERAPEUTIC EXERCISES: CPT

## 2021-10-19 NOTE — FLOWSHEET NOTE
[] [facility]) [facility] CENTER &  Therapy  [address]  [phone]  F: [phone] [x] [address]  [place] [address]   [address]  P: [phone]  F: [phone] [] [address]  Therapy  [address]  P: [phone]  F: [phone] [] [address]  P: [phone]  F: [phone] [] [address]  [facility]   [address]   [place]: [phone]  F: [phone]      Physical Therapy Daily Treatment Note    Date:  10/19/2021  Patient Name:  Jacquie Sher    :  1955  MRN: [id number]  Physician: Dr. Candis Oswald MD                                   Insurance: [facility]  Medical Diagnosis: S42.80A - Dislocation of right shoulder joint; S46.011A - Traumatic tear of right rotator cuff  Rehab Codes: M25.511, M62.81  Onset date: 2021                       Next Dr's appt.: 10/26/2021  Visit# / total visits: 3/12     Cancels/No Shows: 0/0    Subjective:    Pain:  [] Yes  [x] No Location: R shoulder Pain Rating: (0-10 scale) 0/10  Pain altered Tx:  [x] No  [] Yes  Action:  Comments: Pt reports that he is now able to dress without having to modify how he gets his shirts on.      Objective:  Modalities:   Precautions: R shoulder anterior dislocation 2021  Exercises:  Exercise Reps/ Time Weight/ Level Comments   Pulleys 2'/2'  Flex, scap         PROM 10 min   Flex, ABD   Wand AAROM 20x3\" ea 1#  Flex, ABD; progressed to wand 10/19   Wand push press 20x 1# Added 10/19   Supine tricep press 2x10 2# Added 10/19         Sidelying ER 2x10  Added 10/19   Sidelying ABD 2x10  Added 10/19         Wall slides 20x3\" ea   Flex, ABD   Wall isometrics  10x5\" ea   Flex, abd, add, IR, ER   Bicep curls  2x10 2#     Resistive ext 2x10 orange Added 10/19 Resistive row 2x10 orange Added 10/19   Other:    Specific Instructions for next treatment: update HEP once able to complete more AROMs       Treatment Charges: Mins Units   []  Modalities     [x]  Ther Exercise 44 3   []  Manual Therapy     []  Ther Activities     []  Aquatics     []  Vasocompression     []  Other     Total Treatment time 44 3   Estimated Medicare cost as of 10/19/2021:  $270.64    Assessment: [x] Progressing toward goals. Global muscle tightness and end range PROM stretching in flexion and abduction present but improvements noted in overall motion. Significant progression made to treatment program as charted above. Pt performed all exercises with good tolerance. Intermittent cues for proper demonstration of exercises and to slow down speed of performing exercises in order to ensure proper muscle activation. [] No change. [] Other:  [x] Patient would continue to benefit from skilled physical therapy services in order to: restore ROM and strength in order to improve tolerance to overhead tasks and lifting to continue independent ADLs. STG: (to be met in 6 treatments)  1. ? Pain: Pt will demonstrate R shoulder pain of <3/10 with progression of strengthening exercises. 2. ? ROM: Pt will demonstrate full R shoulder AROM when compared to the L shoulder in order to improve tolerance to overhead activities. 3. Patient to be independent with home exercise program as demonstrated by performance with correct form without cues. LTG: (to be met in 12 treatments)  1. Pt will report R shoulder pain of 1/10 with repetitive overhead and lifting tasks. 2. Pt will increase R UE strength to 5/5 globally in order to improve tolerance to lifting and carrying. 3. Pt will demonstrate improved functional activity tolerance as evident by an improved score on the UEFS to <10% functional impairment.                      Patient goals: \"regain normal range of movement, shoulder strength\"    Pt.  Education: [x] Yes  [] No  [x] Reviewed Prior HEP/Ed  Method of Education: [] Verbal  [x] Demo  [] Written  10/12/2021 HEP for charted exercises; [employer]: [email or web address]  Comprehension of Education:  [] Verbalizes understanding. [] Demonstrates understanding. [x] Needs review. [x] Demonstrates/verbalizes HEP/Ed previously given. Plan: [x] Continue current frequency toward long and short term goals.     [] Specific Instructions for subsequent treatments:       Time In: 10:57 am            Time Out: 11:41 am    Electronically signed by:  Sharda Reyes, PT

## 2021-10-21 ENCOUNTER — APPOINTMENT (OUTPATIENT)
Dept: PHYSICAL THERAPY | Facility: CLINIC | Age: 66
End: 2021-10-21
Payer: MEDICARE

## 2021-10-25 ENCOUNTER — HOSPITAL ENCOUNTER (OUTPATIENT)
Dept: PHYSICAL THERAPY | Facility: CLINIC | Age: 66
Setting detail: THERAPIES SERIES
Discharge: HOME | End: 2021-10-25
Payer: MEDICARE

## 2021-10-25 PROCEDURE — 97110 THERAPEUTIC EXERCISES: CPT

## 2021-10-25 NOTE — PROGRESS NOTES
visits with improvements in pain and function of the R shoulder. Improvements in AROM and strength are noted with upon re-assessment. Pt wishes for 10/25/21 to be his last session, therefore, pt has been advised to remain compliant with HEP. Pt verbalizes understanding. STG: (to be met in 6 treatments)  1. ? Pain: Pt will demonstrate R shoulder pain of <3/10 with progression of strengthening exercises. MET 10/25  2. ? ROM: Pt will demonstrate full R shoulder AROM when compared to the L shoulder in order to improve tolerance to overhead activities. MET 10/25  3. Patient to be independent with home exercise program as demonstrated by performance with correct form without cues. MET 10/25  LTG: (to be met in 12 treatments)  1. Pt will report R shoulder pain of 1/10 with repetitive overhead and lifting tasks. MET 10/25  2. Pt will increase R UE strength to 5/5 globally in order to improve tolerance to lifting and carrying. Mostly MET 10/25  3. Pt will demonstrate improved functional activity tolerance as evident by an improved score on the UEFS to <10% functional impairment. MET 10/25     Treatment Plan:  [x] Therapeutic Exercise   21542  [] Iontophoresis: 4 mg/mL Dexamethasone Sodium Phosphate  mAmin  83940   [] Therapeutic Activity  22170 [] Vasopneumatic cold with compression  89000    [] Gait Training   13194 [] Ultrasound   99479   [] Neuromuscular Re-education  63042 [] Electrical Stimulation Unattended  05262   [] Manual Therapy  58099 [] Electrical Stimulation Attended  29118   [x] Instruction in HEP  [] Lumbar/Cervical Traction  33904   [] Aquatic Therapy   22969 [] Cold/hotpack    [] Massage   61418      [] Dry Needling, 1 or 2 muscles  61648   [] Biofeedback, first 15 minutes   38019  [] Biofeedback, additional 15 minutes   93784 [] Dry Needling, 3 or more muscles  87272       Patient Status:     [] Continue per initial plan of care. [] Additional visits necessary.     [x] Other: Pt wishes to be discharged from therapy 10/25/21 due to improvements in pain and function. Electronically signed by Prudence Robbins PT on 10/25/2021 at 12:01 PM      If you have any questions or concerns, please don't hesitate to call. Thank you for your referral.    Physician Signature:________________________________Date:__________________  By signing above or cosigning this note, I have reviewed this plan of care and certify a need for medically necessary rehabilitation services.      *PLEASE SIGN ABOVE AND FAX BACK ALL PAGES*

## 2021-10-25 NOTE — FLOWSHEET NOTE
[] [facility]) [facility] CENTER &  Therapy  [address]  [phone]  F: [phone] [x] [address]  [place] [address]   [address]  P: [phone]  F: [phone] [] [place]  [address]  P: [phone]  F: [phone] [] [address]  P: [phone]  F: [phone] [] [address]  [facility]   [address]   [place]: [phone]  F: [phone]      Physical Therapy Daily Treatment Note    Date:  10/25/2021  Patient Name:  Mariah Vasquez    :  1955  MRN: [id number]  Physician: Dr. Chris Moon MD                                   Insurance: [facility]  Medical Diagnosis: S42.80A - Dislocation of right shoulder joint; S46.011A - Traumatic tear of right rotator cuff  Rehab Codes: M25.511, M62.81  Onset date: 2021                       Next Dr's appt.: 10/26/2021  Visit# / total visits:      Cancels/No Shows: 0/0    Subjective:    Pain:  [] Yes  [x] No Location: R shoulder Pain Rating: (0-10 scale) 0/10  Pain altered Tx:  [x] No  [] Yes  Action:  Comments: Pt reports to be doing well and denies difficulty with any ADLs presently. He is scheduled to see Nadine tomorrow morning and would anticipate discharging therapy after this visit.    Objective:  Modalities:   Precautions: R shoulder anterior dislocation 2021  Exercises:  Exercise Reps/ Time Weight/ Level Comments   Pulleys 2'/2'  Flex, scap         PROM 10 min   Flex, ABD   Wand AAROM 20x3\" ea 1#  Flex, ABD; progressed to wand 10/19   Wand push press 20x 1# Added 10/19   Supine tricep press 2x10 2# Added 10/19         Sidelying ER 2x10  Added 10/19   Sidelying ABD 2x10  Added 10/19         Ball on wall: all directions 10 ea  Added 10/25; mod pressure shoulder pain of <3/10 with progression of strengthening exercises. 2. ? ROM: Pt will demonstrate full R shoulder AROM when compared to the L shoulder in order to improve tolerance to overhead activities. 3. Patient to be independent with home exercise program as demonstrated by performance with correct form without cues. LTG: (to be met in 12 treatments)  1. Pt will report R shoulder pain of 1/10 with repetitive overhead and lifting tasks. 2. Pt will increase R UE strength to 5/5 globally in order to improve tolerance to lifting and carrying. 3. Pt will demonstrate improved functional activity tolerance as evident by an improved score on the UEFS to <10% functional impairment.                      Patient goals: \"regain normal range of movement, shoulder strength\"    Pt. Education:  [x] Yes  [] No  [x] Reviewed Prior HEP/Ed  Method of Education: [x] Verbal  [x] Demo  [x] Written: tband ex copy given along with blue band, 10/25-hard copy kept in chart   10/12/2021 HEP for charted exercises; [facility] [id number]  Comprehension of Education:  [x] Verbalizes understanding. [x] Demonstrates understanding. [] Needs review. [] Demonstrates/verbalizes HEP/Ed previously given. Plan: [x] Continue current frequency toward long and short term goals.     [x] Specific Instructions for subsequent treatments: send progress note for MD visit; probably discharge per pt preference      Time In: 10:57 am            Time Out: 12:03pm    Electronically signed by:  Arleta Mcardle, PTA

## 2021-10-26 ENCOUNTER — OFFICE VISIT (OUTPATIENT)
Dept: ORTHOPEDIC SURGERY | Age: 66
End: 2021-10-26
Payer: MEDICARE

## 2021-10-26 VITALS — BODY MASS INDEX: 34.54 KG/M2 | RESPIRATION RATE: 16 BRPM | HEIGHT: 72 IN | WEIGHT: 255 LBS

## 2021-10-26 DIAGNOSIS — S43.004D DISLOCATION OF RIGHT SHOULDER JOINT, SUBSEQUENT ENCOUNTER: Primary | ICD-10-CM

## 2021-10-26 DIAGNOSIS — S46.011D TRAUMATIC TEAR OF RIGHT ROTATOR CUFF, UNSPECIFIED TEAR EXTENT, SUBSEQUENT ENCOUNTER: ICD-10-CM

## 2021-10-26 PROCEDURE — 99212 OFFICE O/P EST SF 10 MIN: CPT | Performed by: ORTHOPAEDIC SURGERY

## 2021-10-26 RX ORDER — METFORMIN HYDROCHLORIDE 500 MG/1
TABLET, EXTENDED RELEASE ORAL
COMMUNITY
Start: 2021-09-13

## 2021-10-26 NOTE — PROGRESS NOTES
[facility] [address] AND SPORTS MEDICINE  [place] [address]  Dept: [phone]    Ambulatory Orthopedic Consult      CHIEF COMPLAINT:    Chief Complaint   Patient presents with    Shoulder Pain     Right       HISTORY OF PRESENT ILLNESS:      The patient is a right hand dominant 77 y.o. male who is being seen for evaluation of the above, which began on 9/24/2021 secondary to a fall from standing height  . At today's visit, he is using no brace/assistive device. History is obtained today from:   [x]  the patient     [x]  EMR     []  one family member/friend    []  multiple family members/friends    []  other:      He sustained a shoulder dislocation, which was reduced in the ER. He reports that this is his first shoulder dislocation. He denies any numbness/tingling. INTERVAL HISTORY 10/26/2021:  He is seen again today in the office for follow up of a previous issue (as above). Since being seen last, the patient is doing better. At today's visit, he is not using a brace or assistive device. History is obtained today from:   [x]  the patient     []  EMR     []  one family member/friend    []  multiple family members/friends    []  other:           REVIEW OF SYSTEMS:  Musculoskeletal: See HPI for pertinent positives     Past Medical History:    He  has a past medical history of Anesthesia, Cataracts, bilateral, Elevated PSA, Whitmore catheter in place, Hypertension, Kidney problem, Kidney stone, Seasonal allergies, Sepsis ([address] SVT (supraventricular tachycardia) ([address] (2018), and Wears glasses. Past Surgical History:    He  has a past surgical history that includes Cystoscopy; Cystoscopy (09/07/2018); Prostate biopsy (09/07/2018); pr genital surg proc,male unlisted (N/A, 9/7/2018); pr cystourethroscopy (N/A, 11/6/2018); Varicocelectomy; Eye surgery; TURP (N/A, 12/20/2018); and suprapubic catheter placement (N/A, 12/20/2018).      Current Medications:     Current Outpatient Medications:     metFORMIN (GLUCOPHAGE-XR) 500 MG extended release tablet, take 1 tablet by mouth every evening WITH A MEAL, Disp: , Rfl:     diclofenac sodium (VOLTAREN) 1 % GEL, Apply 2 g topically 4 times daily as needed for Pain, Disp: 2 g, Rfl: 0    dilTIAZem (CARDIZEM CD) 180 MG extended release capsule, 2 capsules, Disp: , Rfl:     glipiZIDE (GLUCOTROL XL) 2.5 MG extended release tablet, take 1 tablet by mouth every morning WITH BREAKFAST, Disp: , Rfl:     hydrALAZINE (APRESOLINE) 50 MG tablet, take 1 tablet by mouth three times a day with food, Disp: , Rfl:     metFORMIN HCl  MG/5ML SRER, take 1 tablet by mouth every evening with meals, Disp: , Rfl:     insulin detemir (LEVEMIR FLEXTOUCH) 100 UNIT/ML injection pen, Inject 18 Units into the skin 2 times daily, Disp: 5 pen, Rfl: 1    Insulin Pen Needle (TyromerOGER PEN NEEDLES) 31G X 6 MM MISC, 1 each by Does not apply route daily, Disp: 100 each, Rfl: 3    cetirizine (ZYRTEC) 10 MG tablet, Take 1 tablet by mouth daily (Patient taking differently: Take 10 mg by mouth daily as needed for Allergies or Rhinitis ), Disp: 10 tablet, Rfl: 0    Multiple Vitamins-Minerals (MULTIVITAMIN ADULT) TABS, Take 1 tablet by mouth daily, Disp: , Rfl:      Allergies:    Lisinopril    Family History:  family history includes Alzheimer's Disease in his father and maternal grandmother; COPD in his paternal grandfather; Cancer in his maternal grandfather and paternal grandmother; Diabetes in his father; Hypertension in his mother; Parkinsonism in his father.     Social History:   Social History     Occupational History    Not on file   Tobacco Use    Smoking status: Never Smoker    Smokeless tobacco: Never Used   Substance and Sexual Activity    Alcohol use: Yes     Comment: occasionally    Drug use: No    Sexual activity: Not on file     Occupation: Self-employed /consultant full-time     OBJECTIVE:  Resp 16 Ht 6' (1.829 m)   Wt 255 lb (115.7 kg)   BMI 34.58 kg/m²    Psych: alert and oriented to person, time, and place  Cardio:  well perfused extremities, no cyanosis  Resp:  normal respiratory effort  Musculoskeletal:    Affected upper extremity:    Vascular: Limb well perfused, compartments soft/compressible. Skin: No erythema/ulcers. Intact. Neurovascular Status:  Grossly neurovascularly intact throughout  Motion:  Grossly able to fire major muscle groups with appropriate/expected AROM  Tenderness to Palpation: Shoulder diffusely--significantly improved  -Weakness/pain with shoulder elevation, decreased shoulder elevation--only minimal difference compared to contralateral side  -Good motion on internal and external rotation, good strength compared to contralateral      RADIOLOGY:   10/26/2021 No new radiology images today. Prior images reviewed for reference. FINDINGS: Four views (AP, Grashley, Scapular Y, and Axial) of the right shoulder were obtained in the office today and reviewed, revealing no acute fracture, dislocation, or radioopaque foreign body/tumor. IMPRESSION: No acute fracture/dislocation. Electronically signed by Jany Desouza MD      Relevant previous imaging reviewed, both imaging and report(s) as below:    XR SHOULDER RIGHT 1 VW    Result Date: 9/24/2021  Interval reduction of the anterior shoulder dislocation. XR SHOULDER RIGHT (MIN 2 VIEWS)    Result Date: 9/24/2021  Acute anterior inferior right glenohumeral joint dislocation. No evident fracture. ASSESSMENT AND PLAN:  Body mass index is 34.58 kg/m². He has a right anterior shoulder dislocation with a possible traumatic rotator cuff tear, sustained on 9/24/2021. We discussed that any traumatic rotator cuff tear/injury is likely to be very small, as he is doing very well in terms of motion and strength, as well as pain. Notably, he has a somewhat complex past medical history.   He has a history of incomplete right bundle branch block and supraventricular tachycardias, chronic kidney disease (stage III), and type 2 diabetes. We had a discussion today about the likely diagnosis and its natural history, physical exam and imaging findings, as well as various treatment options in detail. Surgically, we discussed no surgical invention is indicated at this time, as he is doing very well clinically, and he agrees. Orders/referrals were placed as below at today's visit. He may continue to avoid pain provoking activity, and continue to progress his activity, as well as continue his home exercise protocol for physical therapy. He may continue to use his topical Voltaren gel as needed. All questions were answered and the above plan was agreed upon. The patient will return to clinic as needed in the future without x-rays. At his next visit, we may consider an MRI, depending on how he is doing. At the patient's next visit, depending on how the patient is doing and/or new imaging/labs results, we may consider the following options:    []  Lace up ankle     []  CAM boot         []  removable wrist brace     []  PT:        []  Wean out immobilization         []  Adv activity      []  Footmind        []  Spenco       []  Custom Orthotic:               []  AZ brace                    []  Rocker Bottom      []  Night splint    []  Heel cups        []  Strap        []  Toe gizmos    []  Topl        []  NSAIDs         []  Alejandra        []  Ref:         []  Stress Xray    []  CT        []  MRI  []  Inj:          []  Consider OR      []  Pick OR date    No follow-ups on file. No orders of the defined types were placed in this encounter. No orders of the defined types were placed in this encounter. Meet Taylor MD  Orthopedic Surgery        Please excuse any typos/errors, as this note was created with the assistance of voice recognition software.  While intending to generate a document that

## 2021-11-06 ENCOUNTER — HOSPITAL ENCOUNTER (EMERGENCY)
Age: 66
Discharge: HOME | End: 2021-11-06
Attending: EMERGENCY MEDICINE
Payer: MEDICARE

## 2021-11-06 VITALS
DIASTOLIC BLOOD PRESSURE: 85 MMHG | BODY MASS INDEX: 33.18 KG/M2 | OXYGEN SATURATION: 96 % | HEART RATE: 81 BPM | TEMPERATURE: 98.1 F | WEIGHT: 245 LBS | RESPIRATION RATE: 18 BRPM | SYSTOLIC BLOOD PRESSURE: 178 MMHG | HEIGHT: 72 IN

## 2021-11-06 DIAGNOSIS — N30.01 HEMATURIA DUE TO ACUTE CYSTITIS: Primary | ICD-10-CM

## 2021-11-06 LAB
-: NORMAL
ABSOLUTE EOS #: 0.23 K/UL (ref 0–0.44)
ABSOLUTE IMMATURE GRANULOCYTE: 0.04 K/UL (ref 0–0.3)
ABSOLUTE LYMPH #: 1.27 K/UL (ref 1.1–3.7)
ABSOLUTE MONO #: 0.72 K/UL (ref 0.1–1.2)
AMORPHOUS: NORMAL
ANION GAP SERPL CALCULATED.3IONS-SCNC: 13 MMOL/L (ref 9–17)
BACTERIA: NORMAL
BASOPHILS # BLD: 1 % (ref 0–2)
BASOPHILS ABSOLUTE: 0.09 K/UL (ref 0–0.2)
BILIRUBIN URINE: NEGATIVE
BUN BLDV-MCNC: 16 MG/DL (ref 8–23)
BUN/CREAT BLD: 18 (ref 9–20)
CALCIUM SERPL-MCNC: 8.8 MG/DL (ref 8.6–10.4)
CASTS UA: NORMAL /LPF
CHLORIDE BLD-SCNC: 105 MMOL/L (ref 98–107)
CO2: 21 MMOL/L (ref 20–31)
COLOR: ABNORMAL
COMMENT UA: ABNORMAL
CREAT SERPL-MCNC: 0.87 MG/DL (ref 0.7–1.2)
CRYSTALS, UA: NORMAL /HPF
DIFFERENTIAL TYPE: ABNORMAL
EOSINOPHILS RELATIVE PERCENT: 3 % (ref 1–4)
EPITHELIAL CELLS UA: NORMAL /HPF (ref 0–5)
GFR AFRICAN AMERICAN: >60 ML/MIN
GFR NON-AFRICAN AMERICAN: >60 ML/MIN
GFR SERPL CREATININE-BSD FRML MDRD: ABNORMAL ML/MIN/{1.73_M2}
GFR SERPL CREATININE-BSD FRML MDRD: ABNORMAL ML/MIN/{1.73_M2}
GLUCOSE BLD-MCNC: 164 MG/DL (ref 70–99)
GLUCOSE URINE: NEGATIVE
HCT VFR BLD CALC: 42.2 % (ref 40.7–50.3)
HEMOGLOBIN: 13.1 G/DL (ref 13–17)
IMMATURE GRANULOCYTES: 1 %
KETONES, URINE: ABNORMAL
LEUKOCYTE ESTERASE, URINE: ABNORMAL
LYMPHOCYTES # BLD: 17 % (ref 24–43)
MCH RBC QN AUTO: 25.6 PG (ref 25.2–33.5)
MCHC RBC AUTO-ENTMCNC: 31 G/DL (ref 28.4–34.8)
MCV RBC AUTO: 82.6 FL (ref 82.6–102.9)
MONOCYTES # BLD: 10 % (ref 3–12)
MUCUS: NORMAL
NITRITE, URINE: POSITIVE
NRBC AUTOMATED: 0 PER 100 WBC
OTHER OBSERVATIONS UA: NORMAL
PDW BLD-RTO: 14.6 % (ref 11.8–14.4)
PH UA: 7 (ref 5–8)
PLATELET # BLD: 308 K/UL (ref 138–453)
PLATELET ESTIMATE: ABNORMAL
PMV BLD AUTO: 9.3 FL (ref 8.1–13.5)
POTASSIUM SERPL-SCNC: 3.9 MMOL/L (ref 3.7–5.3)
PROTEIN UA: ABNORMAL
RBC # BLD: 5.11 M/UL (ref 4.21–5.77)
RBC # BLD: ABNORMAL 10*6/UL
RBC UA: NORMAL /HPF (ref 0–2)
RENAL EPITHELIAL, UA: NORMAL /HPF
SEG NEUTROPHILS: 68 % (ref 36–65)
SEGMENTED NEUTROPHILS ABSOLUTE COUNT: 5.14 K/UL (ref 1.5–8.1)
SODIUM BLD-SCNC: 139 MMOL/L (ref 135–144)
SPECIFIC GRAVITY UA: 1.02 (ref 1–1.03)
TRICHOMONAS: NORMAL
TURBIDITY: ABNORMAL
URINE HGB: ABNORMAL
UROBILINOGEN, URINE: NORMAL
WBC # BLD: 7.5 K/UL (ref 3.5–11.3)
WBC # BLD: ABNORMAL 10*3/UL
WBC UA: NORMAL /HPF (ref 0–5)
YEAST: NORMAL

## 2021-11-06 PROCEDURE — 87077 CULTURE AEROBIC IDENTIFY: CPT

## 2021-11-06 PROCEDURE — 85025 COMPLETE CBC W/AUTO DIFF WBC: CPT

## 2021-11-06 PROCEDURE — 80048 BASIC METABOLIC PNL TOTAL CA: CPT

## 2021-11-06 PROCEDURE — 87086 URINE CULTURE/COLONY COUNT: CPT

## 2021-11-06 PROCEDURE — 36415 COLL VENOUS BLD VENIPUNCTURE: CPT

## 2021-11-06 PROCEDURE — 99284 EMERGENCY DEPT VISIT MOD MDM: CPT

## 2021-11-06 PROCEDURE — 81001 URINALYSIS AUTO W/SCOPE: CPT

## 2021-11-06 RX ORDER — LOSARTAN POTASSIUM 25 MG/1
25 TABLET ORAL DAILY
COMMUNITY

## 2021-11-06 RX ORDER — CIPROFLOXACIN 500 MG/1
500 TABLET, FILM COATED ORAL 2 TIMES DAILY
Qty: 20 TABLET | Refills: 0 | Status: SHIPPED | OUTPATIENT
Start: 2021-11-06 | End: 2021-11-16

## 2021-11-06 ASSESSMENT — ENCOUNTER SYMPTOMS
BLOOD IN STOOL: 0
WHEEZING: 0
DIARRHEA: 0
CONSTIPATION: 0
ABDOMINAL PAIN: 0
SHORTNESS OF BREATH: 0
CHEST TIGHTNESS: 0
BACK PAIN: 0
ABDOMINAL DISTENTION: 0
COLOR CHANGE: 0
COUGH: 0
VOMITING: 0

## 2021-11-06 NOTE — ED PROVIDER NOTES
[address] [place] [address] ED  eMERGENCY dEPARTMENT eNCOUnter      Pt Name: Jacuqie Sher  MRN: [id number]  [place] 1955  Date of evaluation: 11/6/2021  Provider: Ganga MERCADO PA-C    CHIEF COMPLAINT       Chief Complaint   Patient presents with    Hematuria         HISTORY OF PRESENT ILLNESS  (Location/Symptom, Timing/Onset, Context/Setting, Quality, Duration, Modifying Factors, Severity.)   Jacquie Sher is a 77 y.o. male who presents to the emergency department with complaint of gross hematuria for last 2 days. Patient states that he has had a history of kidney stones but he denies any current back pain, abdominal pain, difficulty emptying his bladder. Patient has history of elevated PSA and laser surgery of his prostate in 2018. Patient states that he has noticed he has been passing clots for the last 2 days but has not had any increased frequency of urination or burning with urination. He denies any scrotal pain. He feels that he is emptying his bladder well. Patient was started on losartan recently which she stopped taking yesterday because he read on the Internet that in rare cases it can cause hematuria. Patient denies any chest pain or shortness of breath      Nursing Notes were reviewed.     ALLERGIES     Lisinopril    CURRENT MEDICATIONS       Discharge Medication List as of 11/6/2021  1:13 PM      CONTINUE these medications which have NOT CHANGED    Details   losartan (COZAAR) 25 MG tablet Take 25 mg by mouth dailyHistorical Med      metFORMIN (GLUCOPHAGE-XR) 500 MG extended release tablet take 1 tablet by mouth every evening WITH A MEALHistorical Med      diclofenac sodium (VOLTAREN) 1 % GEL Apply 2 g topically 4 times daily as needed for Pain, Topical, 4 TIMES DAILY PRN Starting Tue 9/28/2021, Disp-2 g, R-0, Normal      dilTIAZem (CARDIZEM CD) 180 MG extended release capsule 2 capsulesHistorical Med      glipiZIDE (GLUCOTROL XL) 2.5 MG extended release tablet take 1 tablet by mouth every morning WITH BREAKFASTHistorical Med      hydrALAZINE (APRESOLINE) 50 MG tablet take 1 tablet by mouth three times a day with foodHistorical Med      metFORMIN HCl  MG/5ML SRER take 1 tablet by mouth every evening with mealsHistorical Med      insulin detemir (LEVEMIR FLEXTOUCH) 100 UNIT/ML injection pen Inject 18 Units into the skin 2 times daily, Disp-5 pen, R-1Normal      Insulin Pen Needle (KROGER PEN NEEDLES) 31G X 6 MM MISC DAILY Starting Fri 8/9/2019, Disp-100 each, R-3, Normal      cetirizine (ZYRTEC) 10 MG tablet Take 1 tablet by mouth daily, Disp-10 tablet, R-0Normal      Multiple Vitamins-Minerals (MULTIVITAMIN ADULT) TABS Take 1 tablet by mouth dailyHistorical Med             PAST MEDICAL HISTORY         Diagnosis Date    Anesthesia     POSTOP NAUSEA AND VOMITING    Cataracts, bilateral     Elevated PSA     Ogden catheter in place     Hypertension     Kidney problem     STATES HAD DIALYSIS IN AUGUST 2018 AFTER HE COULDN'T URINATE BECAUSE OF HIS PROSTATE    Kidney stone     1990'S    Seasonal allergies     Sepsis ([address]     SVT (supraventricular tachycardia) ([address] 2018    DR. Raj Kurtz Wears glasses     DISTANCE       SURGICAL HISTORY           Procedure Laterality Date    CYSTOSCOPY      CYSTOSCOPY  09/07/2018    with ogden exchange    EYE SURGERY      RADIOKERATOTOMY    [place] CYSTOURETHROSCOPY N/A 11/6/2018    CYSTOSCOPY, REMOVAL AND REINSERTION OF CATHETAR performed by Ken Taylor MD at [address] N/A 9/7/2018    CYSTOSCOPY PROSTATE BIOPSY WITH ULTRASOUND performed by Ken Taylor MD at [address]  09/07/2018    [address] N/A 12/20/2018    SUPRAPUBIC CATHETER INSERTION performed by Ligia Escobar MD at [address] [place] [address] TURP N/A 12/20/2018    [facility] [place] XPS LASER PROSTATE  [facility] #[id number] CATERINA) performed by Ligia Escobar MD at [address] [place] [address] VARICOCELECTOMY           FAMILY HISTORY           Problem Relation Age of Onset    Hypertension Mother     Diabetes Father     Parkinsonism Father     Alzheimer's Disease Father     Alzheimer's Disease Maternal Grandmother     Cancer Maternal Grandfather     Cancer Paternal Grandmother     COPD Paternal Grandfather      Family Status   Relation Name Status    Mother  (Not Specified)    Father  (Not Specified)    MGM      MGF      PGM      PGF          SOCIAL HISTORY      reports that he has never smoked. He has never used smokeless tobacco. He reports current alcohol use. He reports that he does not use drugs. REVIEW OF SYSTEMS    (2-9 systems for level 4, 10 or more for level 5)     Review of Systems   Constitutional: Negative for appetite change, chills, diaphoresis, fatigue and fever. Respiratory: Negative for cough, chest tightness, shortness of breath and wheezing. Cardiovascular: Negative for chest pain. Gastrointestinal: Negative for abdominal distention, abdominal pain, blood in stool, constipation, diarrhea and vomiting. Genitourinary: Positive for hematuria. Negative for decreased urine volume, difficulty urinating, dysuria, enuresis, flank pain, frequency, genital sores, penile discharge, penile pain, penile swelling, scrotal swelling, testicular pain and urgency. Musculoskeletal: Negative for back pain and joint swelling. Skin: Negative for color change and pallor. Neurological: Negative for dizziness, light-headedness and headaches. Except as noted above the remainder of the review of systems was reviewed and negative. PHYSICAL EXAM    (up to 7 for level 4, 8 or more for level 5)     ED Triage Vitals [21 1146]   BP Temp Temp src Pulse Resp SpO2 Height Weight   (!) 178/85 98.1 °F (36.7 °C) -- 81 18 96 % 6' (1.829 m) 245 lb (111.1 kg)       Physical Exam  Vitals and nursing note reviewed. Constitutional:       General: He is not in acute distress. Appearance: Normal appearance.  He is well-developed. He is not ill-appearing, toxic-appearing or diaphoretic. HENT:      Head: Normocephalic and atraumatic. Eyes:      General: No scleral icterus. Right eye: No discharge. Left eye: No discharge. Conjunctiva/sclera: Conjunctivae normal.   Neck:      Thyroid: No thyroid mass, thyromegaly or thyroid tenderness. Cardiovascular:      Rate and Rhythm: Normal rate and regular rhythm. Heart sounds: Normal heart sounds. No murmur heard. No friction rub. No gallop. Pulmonary:      Effort: Pulmonary effort is normal. No respiratory distress. Breath sounds: Normal breath sounds. No wheezing or rales. Chest:      Chest wall: No tenderness. Abdominal:      General: Bowel sounds are normal. There is no distension. Palpations: Abdomen is soft. There is no mass. Tenderness: There is no abdominal tenderness. There is no right CVA tenderness, left CVA tenderness, guarding or rebound. Hernia: No hernia is present. Musculoskeletal:         General: No tenderness. Normal range of motion. Cervical back: Normal range of motion and neck supple. Skin:     General: Skin is warm and dry. Neurological:      General: No focal deficit present. Mental Status: He is alert and oriented to person, place, and time. Psychiatric:         Attention and Perception: Attention and perception normal.         Mood and Affect: Mood and affect normal.         Speech: Speech normal.         Behavior: Behavior normal. Behavior is cooperative. Thought Content:  Thought content normal.         Cognition and Memory: Cognition and memory normal.         Judgment: Judgment normal.             DIAGNOSTIC RESULTS     EKG: All EKG's are interpreted by the Emergency Department Physician who either signs or Co-signs this chart in the absence of a cardiologist.    None indicated    RADIOLOGY:   Non-plain film images such as CT, Ultrasound and MRI are read by the radiologist. Turbidity UA Cloudy (A) Clear    Glucose, Ur NEGATIVE NEGATIVE    Bilirubin Urine NEGATIVE NEGATIVE    Ketones, Urine TRACE (A) NEGATIVE    Specific Gravity, UA 1.025 1.005 - 1.030    Urine Hgb 3+ (A) NEGATIVE    pH, UA 7.0 5.0 - 8.0    Protein, UA 2+ (A) NEGATIVE    Urobilinogen, Urine Normal Normal    Nitrite, Urine POSITIVE (A) NEGATIVE    Leukocyte Esterase, Urine TRACE (A) NEGATIVE    Urinalysis Comments NOT REPORTED    Microscopic Urinalysis   Result Value Ref Range    -          WBC, UA 10 TO 20 0 - 5 /HPF    RBC, UA TOO NUMEROUS TO COUNT 0 - 2 /HPF    Casts UA NOT REPORTED /LPF    Crystals, UA NOT REPORTED None /HPF    Epithelial Cells UA 2 TO 5 0 - 5 /HPF    Renal Epithelial, UA NOT REPORTED 0 /HPF    Bacteria, UA NOT REPORTED None    Mucus, UA NOT REPORTED None    Trichomonas, UA NOT REPORTED None    Amorphous, UA NOT REPORTED None    Other Observations UA NOT REPORTED NOT REQ. Yeast, UA NOT REPORTED None       All other labs were within normal range or not returned as of this dictation. EMERGENCY DEPARTMENT COURSE and DIFFERENTIAL DIAGNOSIS/MDM:   Vitals:    Vitals:    11/06/21 1146   BP: (!) 178/85   Pulse: 81   Resp: 18   Temp: 98.1 °F (36.7 °C)   SpO2: 96%   Weight: 245 lb (111.1 kg)   Height: 6' (1.829 m)           Medical Decision Making patient is a 51-year-old male with hematuria for the last 2 days. He has no back pain, abdominal pain, nausea, vomiting, painful urination or difficulty emptying his bladder. Urinalysis shows positive for UTI with blood. Urine sent for culture. Patient will be placed on antibiotics and follow-up with primary care doctor and his urologist Dr. Yanni Antoine. Return to the emergency room if symptoms worsen. Patient states that he is going to stay off of his losartan until he talks to his primary care doctor because he is concerned that is causing his blood in the urine.   We discussed risks versus benefits and patient states that he will wait to talk to his PCP.    CONSULTS:  None    PROCEDURES:  None    FINAL IMPRESSION      1. Hematuria due to acute cystitis          DISPOSITION/PLAN   DISPOSITION Decision To Discharge 11/06/2021 01:12:38 PM      PATIENT REFERRED TO:   Jeri Thibodeaux MD  [facility] [address] [facility]          Mike Messick, [address] [facility] [place] [address]  [address], [address] [facility] [address]  [phone]    Schedule an appointment as soon as possible for a visit in 2 days  return, If symptoms worsen      DISCHARGE MEDICATIONS:     Discharge Medication List as of 11/6/2021  1:13 PM      START taking these medications    Details   ciprofloxacin (CIPRO) 500 MG tablet Take 1 tablet by mouth 2 times daily for 10 days, Disp-20 tablet, R-0Print               (Please note that portions of this note were completed with a voice recognition program.  Efforts were made to edit the dictations but occasionally words are mis-transcribed.)    Yancy MERCADO PA-C  Attending Emergency Physician         Natasha Aly PA-C  11/06/21 2048

## 2021-11-06 NOTE — ED PROVIDER NOTES
eMERGENCY dEPARTMENT eNCOUnter   Independent Attestation     Pt Name: Katia Marques  MRN: [id number]  [place] 1955  Date of evaluation: 11/6/21     Katia Marques is a 77 y.o. male with CC: Hematuria      Based on the medical record the care appears appropriate. I was personally available for consultation in the Emergency Department. The care is provided during an unprecedented national emergency due to the novel coronavirus, COVID 19.     Calhoun Goodpasture, MD  Attending Emergency Physician                   Calhoun Goodpasture, MD  11/06/21 8883

## 2021-11-08 LAB
CULTURE: ABNORMAL
Lab: ABNORMAL
SPECIMEN DESCRIPTION: ABNORMAL

## 2021-11-22 NOTE — DISCHARGE SUMMARY
[] [facility]) - [facility] &  Therapy  [address]  [phone]  F: [phone] [x] [address] [facility]   [address]  P: [phone]  F: [phone] [] [address] &  Therapy  [address] [facility]  P: [phone]  F: [phone] [] [address]  P: [phone]  F: [phone] [] [address]  [address] [address] [facility]   [address]   [place]: [phone]  F: [phone]      Physical Therapy Discharge Note    Date: 2021      Patient: Araceli Brown  : 1955  MRN: [id number]    Physician: Dr. Stacey Mccormick MD                                   Insurance: [facility]  Medical Diagnosis: S43.004A - Dislocation of right shoulder joint; S46.011A - Traumatic tear of right rotator cuff  Rehab Codes: M25.511, M62.81  Onset date: 2021                       Next 's appt.: 10/26/2021  Visit# / total visits:                                   Cancels/No Shows: 0/0  Date range of services: 10/12/21 to 10/25/21         Subjective:  Pain:  []? ? Yes  [x]? ? No   Location: R shoulder   Pain Rating: (0-10 scale) 0/10  Pain altered Tx:  [x]? ? No  []? ? Yes  Action:  Comments: Pt reports as of last Thursday, his R shoulder has taken a turn for the better and feels almost back to normal. Pt states he only has some twinges of pain when using his arm. Pt wishes to be done with therapy today. PT placed on hold and patient followed up with Dr. Michael Benson following his last treatment session. Pt did not call to reschedule any additional visits and is discharged at this time. Good prognosis.      Objective:               ROM  °A/P END FEEL STRENGTH     Left Right   Left Right   Sit Shld Flex 162 162   5 5   Sit Shld Abd 152 150   5 5-   Sit Shld IR T6 T2         Shoulder Flex             Ext             ABD             ER @ 0  65 70   5 5   IR       5 5   Elbow Flex.       5 5   Elbow Ext.       5 5   UEFS: 100% max function      Assessment:  Mellissa Lefort has completed 4 therapy visits with improvements in pain and function of the R shoulder. Improvements in AROM and strength are noted with upon re-assessment. Pt wishes for 10/25/21 to be his last session, therefore, pt has been advised to remain compliant with HEP. Pt verbalizes understanding. STG: (to be met in 6 treatments)  1. ? Pain: Pt will demonstrate R shoulder pain of <3/10 with progression of strengthening exercises. MET 10/25  2. ? ROM: Pt will demonstrate full R shoulder AROM when compared to the L shoulder in order to improve tolerance to overhead activities. MET 10/25  3. Patient to be independent with home exercise program as demonstrated by performance with correct form without cues. MET 10/25  LTG: (to be met in 12 treatments)  1. Pt will report R shoulder pain of 1/10 with repetitive overhead and lifting tasks. MET 10/25  2. Pt will increase R UE strength to 5/5 globally in order to improve tolerance to lifting and carrying. Mostly MET 10/25  3. Pt will demonstrate improved functional activity tolerance as evident by an improved score on the UEFS to <10% functional impairment. MET 10/25         Treatment to Date:  [x] Therapeutic Exercise    [] Modalities:  [] Therapeutic Activity    [] Ultrasound  [] Electrical Stimulation  [] Gait Training     [] Massage       [] Lumbar/Cervical Traction  [] Neuromuscular Re-education [] Cold/hotpack [] Iontophoresis: 4 mg/mL  [x] Instruction in Home Exercise Program                     Dexamethasone Sodium  [x] Manual Therapy             Phosphate 40-80 mAmin  [] Aquatic Therapy                   [] Vasocompression/    [] Other:             Game Ready    Discharge Status:     [x] Pt recovered from conditions. Treatment goals were met.     [x] Pt received maximum

## 2022-01-19 ENCOUNTER — HOSPITAL ENCOUNTER (OUTPATIENT)
Age: 67
Discharge: HOME | End: 2022-01-19
Payer: MEDICARE

## 2022-01-19 LAB
ANION GAP SERPL CALCULATED.3IONS-SCNC: 19 MMOL/L (ref 9–17)
BUN BLDV-MCNC: 16 MG/DL (ref 8–23)
BUN/CREAT BLD: ABNORMAL (ref 9–20)
CALCIUM SERPL-MCNC: 9.3 MG/DL (ref 8.6–10.4)
CHLORIDE BLD-SCNC: 109 MMOL/L (ref 98–107)
CHOLESTEROL/HDL RATIO: 4
CHOLESTEROL: 164 MG/DL
CO2: 16 MMOL/L (ref 20–31)
CREAT SERPL-MCNC: 1.02 MG/DL (ref 0.7–1.2)
GFR AFRICAN AMERICAN: >60 ML/MIN
GFR NON-AFRICAN AMERICAN: >60 ML/MIN
GFR SERPL CREATININE-BSD FRML MDRD: ABNORMAL ML/MIN/{1.73_M2}
GFR SERPL CREATININE-BSD FRML MDRD: ABNORMAL ML/MIN/{1.73_M2}
GLUCOSE BLD-MCNC: 140 MG/DL (ref 70–99)
HDLC SERPL-MCNC: 41 MG/DL
LDL CHOLESTEROL: 98 MG/DL (ref 0–130)
POTASSIUM SERPL-SCNC: 4.2 MMOL/L (ref 3.7–5.3)
SODIUM BLD-SCNC: 144 MMOL/L (ref 135–144)
TRIGL SERPL-MCNC: 124 MG/DL
VLDLC SERPL CALC-MCNC: NORMAL MG/DL (ref 1–30)

## 2022-01-19 PROCEDURE — 80048 BASIC METABOLIC PNL TOTAL CA: CPT

## 2022-01-19 PROCEDURE — 80061 LIPID PANEL: CPT

## 2022-01-19 PROCEDURE — 36415 COLL VENOUS BLD VENIPUNCTURE: CPT

## 2022-08-02 NOTE — H&P
Department of Internal Medicine  Attending History and Physical        CHIEF COMPLAINT:     History Obtained From:  patient    HISTORY OF PRESENT ILLNESS:    Urinary frequency and nocturia about three weeks ago. About a week ago noted less frequency in urination/later on noted difficulty in urination. Presented to the ER and was noted to have urinary retention. Whitmore was inserted - approx 6000 cc of reddish urine drained. Patient states he has had chronic prostate problems, had a prostate biopsy several years ago - was told it was benign. He has not had any follow-up since his urologist retired. He gives a history of hypertension, had angioedema from an ACE inhibitor. Pt states he has not had any follow-up either. He had a baseline creatinine of 17.52, BUN of 108, Est GFR: 2, Potassium of 6.0, CO2 12/Anion gap 32. Past Medical/Surgical History:  Past Medical History:   Diagnosis Date    Elevated PSA     Hypertension     Kidney stone     Seasonal allergies          Past Surgical History:   Procedure Laterality Date    CYSTOSCOPY         No current facility-administered medications on file prior to encounter. Current Outpatient Prescriptions on File Prior to Encounter   Medication Sig Dispense Refill    HYDROCHLOROTHIAZIDE PO Take 25 mg by mouth daily       aspirin 325 MG tablet Take 325 mg by mouth daily          Allergies:  Lisinopril    Social History:   Social History     Social History    Marital status:      Spouse name: N/A    Number of children: N/A    Years of education: N/A     Occupational History    Not on file. Social History Main Topics    Smoking status: Never Smoker    Smokeless tobacco: Never Used    Alcohol use Yes      Comment: occasionally    Drug use: No    Sexual activity: Not on file     Other Topics Concern    Not on file     Social History Narrative    No narrative on file       Family History:   Positive for hypertension.     REVIEW OF [FreeTextEntry1] : PATIENT:	Ana Larua Benítez 				\par \par \par Thursday, July 28, 2022\par \par I saw Ana Laura in the office today.  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 Benítez reports that following her sudden-onset headache, a CT scan was performed which confirmed a significant-sized intracranial hemorrhage by CT.  At that time, a diagnostic angiogram was performed which showed a questionable AVM.  However, a followup angiogram was then performed at [place] once again in June that confirmed the presence of a small pial fistula nearly at the surface of the right posterior parietal lobe fed from the distal right posterior cerebral artery with a single draining vein.  Ana Laura has been left with a significant left visual field deficit primarily in the inferior visual quadrant, and other than that she is neurologically intact.  She comes now for neurosurgical evaluation.\par \par Ana Laura reports no other major medical problems with the exception of her headache symptoms.  She has had an ectopic pregnancy treated surgically but has had no other major surgery.  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  She lives with her fiancé.  She has a 4-year-old daughter.  She has no drug allergies.  Her medications include nortriptyline and Ubrelvy for migraine, along with fluconazole, progesterone, terconazole, and Depakote.  Review of systems, with the exception of her headaches, is not contributory, and she denies any cardiovascular, pulmonary, or GI complaints.  \par \par Neurologically, Ana Laura does have an inferior visual field cut on the left but no other neurological findings.  She is otherwise motor and sensory intact.  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  CT scan and MRI show significant clot cavity both on the MRI and CT.  The clot appears to extend from the origination of the point of the AVM and extends toward the calcarine cortex.  In reviewing her angiogram from her original presentation, I do think there was apparent early draining vein seen at that time.  However, it does appear more prominent on the followup angiogram.  This does appear distal from the calcarine cortex and quite safe to potentially embolize and/or resect.\par \par I had a long discussion with Ana Laura and her dad.  I do think the first question is that this should be treated without any question.  The question is how.  I have recommended that she undergo a repeat diagnostic angiogram with intention to treat.  I do think there is a high likelihood of curing the AVM without open surgery and told her so.  I recommend attempt at embolization with the understanding that if the AVM was left following treatment, we would go ahead and perform a craniotomy for cure.  I do not see this AVM as particularly challenging surgically.  However, it would be to her benefit for us to cure it without craniotomy.\par \par We will be in touch with Ana Laura regarding her decision after reviewing the risks, benefits, and alternatives.\par \par \par \par Colton Galindo MD\par \par [facility] #[id number]\par \par  SYSTEMS:  CONSTITUTIONAL:  negative for  fevers and chills  EYES:  negative for  glasses, double vision and blurred vision  HEENT:  negative for  hearing loss, tinnitus, ear drainage, nasal congestion and epistaxis  RESPIRATORY:  negative for  cough with sputum, dyspnea, wheezing, hemoptysis and pleuritic pain  CARDIOVASCULAR:  negative for  dyspnea, palpitations, orthopnea, PND, exertional chest pressure/discomfort, syncope  GASTROINTESTINAL:  negative for change in bowel habits, abdominal distention, dysphagia, hematemesis and hemtochezia  GENITOURINARY:  See present illness. INTEGUMENT/BREAST:  negative for dryness, skin color change and changes in lesion  HEMATOLOGIC/LYMPHATIC:  negative for easy bruising, bleeding and petechiae  ALLERGIC/IMMUNOLOGIC:  ACE inhibitors.   ENDOCRINE:  negative for heat intolerance, cold intolerance and tremor  MUSCULOSKELETAL:  negative for  pain, joint swelling and stiff joints  NEUROLOGICAL:  negative for dizziness, seizures, memory problems and speech problems  BEHAVIOR/PSYCH:  negative for depressed mood    PHYSICAL EXAM:  Vitals:  BP (!) 200/109   Pulse 90   Temp 98 °F (36.7 °C) (Oral)   Resp 17   Ht 6' (1.829 m)   Wt 220 lb (99.8 kg)   SpO2 97%   BMI 29.84 kg/m²     General Appearance: alert and oriented to person, place and time and in no acute distress  Skin: warm and dry and no rash or erythema  Head: normocephalic and atraumatic  Eyes: pupils equal, round, and reactive to light, conjunctivae normal and sclera anicteric  ENT: oropharynx clear and moist with normal mucous membranes  Neck: neck supple and non tender without mass, no thyromegaly, no thyroid nodules and no cervical adenopathy   Pulmonary/Chest: clear to auscultation bilaterally- no wheezes, rales or rhonchi, normal air movement, no respiratory distress and no chest wall tenderness  Cardiovascular: normal rate, regular rhythm, normal S1 and S2, no murmurs, no gallops, intact distal pulses, no carotid bruits and no JVD  Abdomen: soft, non-tender, non-distended, bowel sounds normal and no abdominal bruits  Genitourinary: deferred. Extremities: no cyanosis, no clubbing, no edema and Ellyn's sign negative bilaterally  Musculoskeletal: no swollen joints, no joint deformity and no tender joints  Neurologic: reflexes normal and symmetric, no cranial nerve deficit and speech normal      DATA:  Reviewed/discussed with patient. ASSESSMENT AND PLAN:      Principal Problem:    ESRD (end stage renal disease) ([facility] [address]  Active Problems:    Urinary retention    Elevated PSA    Hyperkalemia    High anion gap metabolic acidosis    Hypertension    Renal failure  Resolved Problems:    * No resolved hospital problems. *    Dioscussed with Dr. Pradeep Roche. ER lab results reviewed. Meds reconciled. Admitting orders entered.     Electronically signed by Ronald Maldonado MD on 8/18/2018 at 8:31 PM

## 2023-02-01 ENCOUNTER — HOSPITAL ENCOUNTER (OUTPATIENT)
Age: 68
Setting detail: SPECIMEN
Discharge: HOME | End: 2023-02-01

## 2023-02-01 LAB
ABSOLUTE EOS #: 0.48 K/UL (ref 0–0.44)
ABSOLUTE IMMATURE GRANULOCYTE: 0.06 K/UL (ref 0–0.3)
ABSOLUTE LYMPH #: 1.7 K/UL (ref 1.1–3.7)
ABSOLUTE MONO #: 0.86 K/UL (ref 0.1–1.2)
ALBUMIN SERPL-MCNC: 4.5 G/DL (ref 3.5–5.2)
ALBUMIN/GLOBULIN RATIO: 1.6 (ref 1–2.5)
ALP SERPL-CCNC: 92 U/L (ref 40–129)
ALT SERPL-CCNC: 22 U/L (ref 5–41)
ANION GAP SERPL CALCULATED.3IONS-SCNC: 19 MMOL/L (ref 9–17)
AST SERPL-CCNC: 19 U/L
BASOPHILS # BLD: 1 % (ref 0–2)
BASOPHILS ABSOLUTE: 0.11 K/UL (ref 0–0.2)
BILIRUB SERPL-MCNC: 0.8 MG/DL (ref 0.3–1.2)
BUN SERPL-MCNC: 18 MG/DL (ref 8–23)
CALCIUM SERPL-MCNC: 9.2 MG/DL (ref 8.6–10.4)
CHLORIDE SERPL-SCNC: 104 MMOL/L (ref 98–107)
CHOLEST SERPL-MCNC: 186 MG/DL
CHOLESTEROL/HDL RATIO: 3.8
CO2 SERPL-SCNC: 20 MMOL/L (ref 20–31)
CREAT SERPL-MCNC: 0.88 MG/DL (ref 0.7–1.2)
CREATININE URINE: 98.7 MG/DL (ref 39–259)
EOSINOPHILS RELATIVE PERCENT: 6 % (ref 1–4)
EST. AVERAGE GLUCOSE BLD GHB EST-MCNC: 177 MG/DL
GFR SERPL CREATININE-BSD FRML MDRD: >60 ML/MIN/1.73M2
GLUCOSE SERPL-MCNC: 155 MG/DL (ref 70–99)
HBA1C MFR BLD: 7.8 % (ref 4–6)
HCT VFR BLD AUTO: 46.9 % (ref 40.7–50.3)
HDLC SERPL-MCNC: 49 MG/DL
HGB BLD-MCNC: 14.3 G/DL (ref 13–17)
IMMATURE GRANULOCYTES: 1 %
LDLC SERPL CALC-MCNC: 115 MG/DL (ref 0–130)
LYMPHOCYTES # BLD: 22 % (ref 24–43)
MCH RBC QN AUTO: 24.3 PG (ref 25.2–33.5)
MCHC RBC AUTO-ENTMCNC: 30.5 G/DL (ref 28.4–34.8)
MCV RBC AUTO: 79.6 FL (ref 82.6–102.9)
MICROALBUMIN/CREAT 24H UR: 301 MG/L
MICROALBUMIN/CREAT UR-RTO: 305 MCG/MG CREAT
MONOCYTES # BLD: 11 % (ref 3–12)
NRBC AUTOMATED: 0 PER 100 WBC
PDW BLD-RTO: 17.4 % (ref 11.8–14.4)
PLATELET # BLD AUTO: 284 K/UL (ref 138–453)
PMV BLD AUTO: 10.3 FL (ref 8.1–13.5)
POTASSIUM SERPL-SCNC: 4 MMOL/L (ref 3.7–5.3)
PROT SERPL-MCNC: 7.4 G/DL (ref 6.4–8.3)
RBC # BLD: 5.89 M/UL (ref 4.21–5.77)
RBC # BLD: ABNORMAL 10*6/UL
SEG NEUTROPHILS: 59 % (ref 36–65)
SEGMENTED NEUTROPHILS ABSOLUTE COUNT: 4.65 K/UL (ref 1.5–8.1)
SODIUM SERPL-SCNC: 143 MMOL/L (ref 135–144)
TRIGL SERPL-MCNC: 108 MG/DL
TSH SERPL-ACNC: 4.24 UIU/ML (ref 0.3–5)
WBC # BLD AUTO: 7.9 K/UL (ref 3.5–11.3)

## 2023-03-10 ENCOUNTER — HOSPITAL ENCOUNTER (OUTPATIENT)
Dept: PREADMISSION TESTING | Age: 68
Discharge: HOME | End: 2023-03-10

## 2023-03-29 DIAGNOSIS — M25.562 LEFT KNEE PAIN, UNSPECIFIED CHRONICITY: Primary | ICD-10-CM

## 2023-03-30 ENCOUNTER — OFFICE VISIT (OUTPATIENT)
Dept: ORTHOPEDIC SURGERY | Age: 68
End: 2023-03-30

## 2023-03-30 VITALS — RESPIRATION RATE: 16 BRPM | HEIGHT: 72 IN | WEIGHT: 245 LBS | OXYGEN SATURATION: 100 % | BODY MASS INDEX: 33.18 KG/M2

## 2023-03-30 DIAGNOSIS — M17.10 ARTHRITIS OF KNEE: Primary | ICD-10-CM

## 2023-03-30 DIAGNOSIS — M23.307 DEGENERATIVE TEAR OF MENISCUS OF LEFT KNEE: ICD-10-CM

## 2023-03-30 RX ORDER — LIDOCAINE HYDROCHLORIDE 10 MG/ML
4 INJECTION, SOLUTION INFILTRATION; PERINEURAL ONCE
Status: COMPLETED | OUTPATIENT
Start: 2023-03-30 | End: 2023-03-30

## 2023-03-30 RX ORDER — TRIAMCINOLONE ACETONIDE 40 MG/ML
40 INJECTION, SUSPENSION INTRA-ARTICULAR; INTRAMUSCULAR ONCE
Status: COMPLETED | OUTPATIENT
Start: 2023-03-30 | End: 2023-03-30

## 2023-03-30 RX ADMIN — TRIAMCINOLONE ACETONIDE 40 MG: 40 INJECTION, SUSPENSION INTRA-ARTICULAR; INTRAMUSCULAR at 14:45

## 2023-03-30 RX ADMIN — LIDOCAINE HYDROCHLORIDE 4 ML: 10 INJECTION, SOLUTION INFILTRATION; PERINEURAL at 14:45

## 2023-03-30 NOTE — PROGRESS NOTES
instruction/education was provided. At the patient's next visit, depending on how the patient is doing and/or new imaging/labs results, we may consider the following options:    []  Lace up ankle     []  CAM boot         []  removable wrist brace     []  PT:        []  Wean out immobilization         []  Adv activity      []  Footmind        []  Spenco       []  Custom Orthotic:               []  AZ brace                    []  Rocker Bottom      []  Night splint    []  Heel cups        []  Strap        []  Toe gizmos    []  Topl        []  NSAIDs         []  Alejandra        []  Ref:         []  Stress Xray    []  CT        []  MRI  []  Inj:          []  Consider OR      []  Pick OR date    No follow-ups on file. No orders of the defined types were placed in this encounter. No orders of the defined types were placed in this encounter. Mahnaz Kuhn MD  Orthopedic Surgery        Please excuse any typos/errors, as this note was created with the assistance of voice recognition software. While intending to generate a document that actually reflects the content of the visit, the document can still have some errors including those of syntax and sound-a-like substitutions which may escape proof reading. In such instances, actual meaning can be extrapolated by context.

## 2023-08-03 ENCOUNTER — HOSPITAL ENCOUNTER (OUTPATIENT)
Age: 68
Setting detail: SPECIMEN
Discharge: HOME | End: 2023-08-03

## 2023-08-03 LAB
ANION GAP SERPL CALCULATED.3IONS-SCNC: 15 MMOL/L (ref 9–17)
BUN SERPL-MCNC: 20 MG/DL (ref 8–23)
CALCIUM SERPL-MCNC: 9.4 MG/DL (ref 8.6–10.4)
CHLORIDE SERPL-SCNC: 105 MMOL/L (ref 98–107)
CO2 SERPL-SCNC: 19 MMOL/L (ref 20–31)
CREAT SERPL-MCNC: 1 MG/DL (ref 0.7–1.2)
GFR SERPL CREATININE-BSD FRML MDRD: >60 ML/MIN/1.73M2
GLUCOSE SERPL-MCNC: 160 MG/DL (ref 70–99)
POTASSIUM SERPL-SCNC: 4.6 MMOL/L (ref 3.7–5.3)
SODIUM SERPL-SCNC: 139 MMOL/L (ref 135–144)

## 2023-08-04 LAB
CREAT UR-MCNC: 178.9 MG/DL (ref 39–259)
EST. AVERAGE GLUCOSE BLD GHB EST-MCNC: 160 MG/DL
HBA1C MFR BLD: 7.2 % (ref 4–6)
MICROALBUMIN UR-MCNC: 489 MG/L
MICROALBUMIN/CREAT UR-RTO: 273 MCG/MG CREAT

## 2024-03-18 ENCOUNTER — HOSPITAL ENCOUNTER (OUTPATIENT)
Age: 69
Setting detail: SPECIMEN
Discharge: HOME | End: 2024-03-18

## 2024-03-18 LAB
ALBUMIN SERPL-MCNC: 4.3 G/DL (ref 3.5–5.2)
ALBUMIN/GLOB SERPL: 2 {RATIO} (ref 1–2.5)
ALP SERPL-CCNC: 84 U/L (ref 40–129)
ALT SERPL-CCNC: 18 U/L (ref 10–50)
ANION GAP SERPL CALCULATED.3IONS-SCNC: 14 MMOL/L (ref 9–16)
AST SERPL-CCNC: 19 U/L (ref 10–50)
BASOPHILS # BLD: 0.09 K/UL (ref 0–0.2)
BASOPHILS NFR BLD: 1 % (ref 0–2)
BILIRUB SERPL-MCNC: 0.8 MG/DL (ref 0–1.2)
BUN SERPL-MCNC: 18 MG/DL (ref 8–23)
CALCIUM SERPL-MCNC: 9.3 MG/DL (ref 8.6–10.4)
CHLORIDE SERPL-SCNC: 104 MMOL/L (ref 98–107)
CHOLEST SERPL-MCNC: 170 MG/DL (ref 0–199)
CHOLESTEROL/HDL RATIO: 4
CO2 SERPL-SCNC: 21 MMOL/L (ref 20–31)
CREAT SERPL-MCNC: 1.1 MG/DL (ref 0.7–1.2)
CREAT UR-MCNC: 135 MG/DL (ref 39–259)
EOSINOPHIL # BLD: 0.27 K/UL (ref 0–0.44)
EOSINOPHILS RELATIVE PERCENT: 4 % (ref 1–4)
ERYTHROCYTE [DISTWIDTH] IN BLOOD BY AUTOMATED COUNT: 14.8 % (ref 11.8–14.4)
EST. AVERAGE GLUCOSE BLD GHB EST-MCNC: 163 MG/DL
GFR SERPL CREATININE-BSD FRML MDRD: >60 ML/MIN/1.73M2
GLUCOSE SERPL-MCNC: 154 MG/DL (ref 74–99)
HBA1C MFR BLD: 7.3 % (ref 4–6)
HCT VFR BLD AUTO: 48.7 % (ref 40.7–50.3)
HDLC SERPL-MCNC: 45 MG/DL
HGB BLD-MCNC: 14.5 G/DL (ref 13–17)
IMM GRANULOCYTES # BLD AUTO: 0.04 K/UL (ref 0–0.3)
IMM GRANULOCYTES NFR BLD: 1 %
LDLC SERPL CALC-MCNC: 92 MG/DL (ref 0–100)
LYMPHOCYTES NFR BLD: 1.2 K/UL (ref 1.1–3.7)
LYMPHOCYTES RELATIVE PERCENT: 18 % (ref 24–43)
MCH RBC QN AUTO: 25.1 PG (ref 25.2–33.5)
MCHC RBC AUTO-ENTMCNC: 29.8 G/DL (ref 28.4–34.8)
MCV RBC AUTO: 84.3 FL (ref 82.6–102.9)
MICROALBUMIN UR-MCNC: 186 MG/L (ref 0–20)
MICROALBUMIN/CREAT UR-RTO: 138 MCG/MG CREAT (ref 0–17)
MONOCYTES NFR BLD: 0.68 K/UL (ref 0.1–1.2)
MONOCYTES NFR BLD: 10 % (ref 3–12)
NEUTROPHILS NFR BLD: 66 % (ref 36–65)
NEUTS SEG NFR BLD: 4.57 K/UL (ref 1.5–8.1)
NRBC BLD-RTO: 0 PER 100 WBC
PLATELET # BLD AUTO: 265 K/UL (ref 138–453)
PMV BLD AUTO: 10 FL (ref 8.1–13.5)
POTASSIUM SERPL-SCNC: 4.5 MMOL/L (ref 3.7–5.3)
PROT SERPL-MCNC: 7.1 G/DL (ref 6.6–8.7)
RBC # BLD AUTO: 5.78 M/UL (ref 4.21–5.77)
RBC # BLD: ABNORMAL 10*6/UL
SODIUM SERPL-SCNC: 139 MMOL/L (ref 136–145)
TRIGL SERPL-MCNC: 166 MG/DL (ref 0–149)
TSH SERPL DL<=0.05 MIU/L-ACNC: 2.47 UIU/ML (ref 0.27–4.2)
VLDLC SERPL CALC-MCNC: 33 MG/DL
WBC OTHER # BLD: 6.9 K/UL (ref 3.5–11.3)

## 2024-03-20 ENCOUNTER — HOSPITAL ENCOUNTER (OUTPATIENT)
Age: 69
Setting detail: SPECIMEN
Discharge: HOME | End: 2024-03-20

## 2024-03-20 LAB — HCV AB SERPL QL IA: NONREACTIVE

## 2024-08-22 ENCOUNTER — HOSPITAL ENCOUNTER (INPATIENT)
Age: 69
LOS: 1 days | Discharge: HOME | DRG: 690 | End: 2024-08-23
Admitting: INTERNAL MEDICINE
Payer: MEDICARE

## 2024-08-22 ENCOUNTER — APPOINTMENT (OUTPATIENT)
Dept: CT IMAGING | Age: 69
DRG: 690 | End: 2024-08-22
Payer: MEDICARE

## 2024-08-22 ENCOUNTER — ANESTHESIA (OUTPATIENT)
Dept: OPERATING ROOM | Age: 69
End: 2024-08-22
Payer: MEDICARE

## 2024-08-22 ENCOUNTER — ANESTHESIA EVENT (OUTPATIENT)
Dept: OPERATING ROOM | Age: 69
End: 2024-08-22
Payer: MEDICARE

## 2024-08-22 DIAGNOSIS — R33.9 ACUTE ON CHRONIC URINARY RETENTION: ICD-10-CM

## 2024-08-22 DIAGNOSIS — R31.0 GROSS HEMATURIA: ICD-10-CM

## 2024-08-22 DIAGNOSIS — R33.9 URINARY RETENTION: Primary | ICD-10-CM

## 2024-08-22 PROBLEM — N30.01 ACUTE CYSTITIS WITH HEMATURIA: Status: ACTIVE | Noted: 2024-08-22

## 2024-08-22 PROBLEM — R97.20 ELEVATED PSA: Status: RESOLVED | Noted: 2018-08-18 | Resolved: 2024-08-22

## 2024-08-22 PROBLEM — R65.20 SEVERE SEPSIS (HCC): Status: RESOLVED | Noted: 2018-10-24 | Resolved: 2024-08-22

## 2024-08-22 PROBLEM — N13.9 OBSTRUCTIVE UROPATHY: Status: RESOLVED | Noted: 2018-08-19 | Resolved: 2024-08-22

## 2024-08-22 PROBLEM — N19 RENAL FAILURE: Status: RESOLVED | Noted: 2018-08-18 | Resolved: 2024-08-22

## 2024-08-22 PROBLEM — E11.10 DIABETIC KETOACIDOSIS WITHOUT COMA ASSOCIATED WITH TYPE 2 DIABETES MELLITUS (HCC): Status: RESOLVED | Noted: 2019-08-07 | Resolved: 2024-08-22

## 2024-08-22 PROBLEM — A41.9 SEVERE SEPSIS (HCC): Status: RESOLVED | Noted: 2018-10-24 | Resolved: 2024-08-22

## 2024-08-22 PROBLEM — H20.00 ACUTE CYCLITIS: Status: ACTIVE | Noted: 2024-08-22

## 2024-08-22 PROBLEM — K62.89 PROCTITIS: Status: ACTIVE | Noted: 2024-08-22

## 2024-08-22 PROBLEM — R73.9 HYPERGLYCEMIA WITHOUT KETOSIS: Status: RESOLVED | Noted: 2019-08-07 | Resolved: 2024-08-22

## 2024-08-22 LAB
ANION GAP SERPL CALCULATED.3IONS-SCNC: 12 MMOL/L (ref 9–17)
BACTERIA URNS QL MICRO: ABNORMAL
BASOPHILS # BLD: 0.09 K/UL (ref 0–0.2)
BASOPHILS NFR BLD: 1 % (ref 0–2)
BILIRUB UR QL STRIP: NEGATIVE
BILIRUB UR QL STRIP: NEGATIVE
BUN SERPL-MCNC: 23 MG/DL (ref 8–23)
BUN/CREAT SERPL: 23 (ref 9–20)
CALCIUM SERPL-MCNC: 8.6 MG/DL (ref 8.6–10.4)
CHLORIDE SERPL-SCNC: 105 MMOL/L (ref 98–107)
CLARITY UR: ABNORMAL
CLARITY UR: ABNORMAL
CO2 SERPL-SCNC: 20 MMOL/L (ref 20–31)
COLOR UR: ABNORMAL
COLOR UR: ABNORMAL
CREAT SERPL-MCNC: 1 MG/DL (ref 0.7–1.2)
EOSINOPHIL # BLD: 0.15 K/UL (ref 0–0.44)
EOSINOPHILS RELATIVE PERCENT: 1 % (ref 1–4)
EPI CELLS #/AREA URNS HPF: ABNORMAL /HPF (ref 0–5)
EPI CELLS #/AREA URNS HPF: NORMAL /HPF (ref 0–5)
ERYTHROCYTE [DISTWIDTH] IN BLOOD BY AUTOMATED COUNT: 15.3 % (ref 11.8–14.4)
GFR, ESTIMATED: 81 ML/MIN/1.73M2
GLUCOSE BLD-MCNC: 160 MG/DL (ref 75–110)
GLUCOSE BLD-MCNC: 182 MG/DL (ref 75–110)
GLUCOSE BLD-MCNC: 185 MG/DL (ref 75–110)
GLUCOSE SERPL-MCNC: 174 MG/DL (ref 70–99)
GLUCOSE UR STRIP-MCNC: ABNORMAL MG/DL
GLUCOSE UR STRIP-MCNC: NEGATIVE MG/DL
HCT VFR BLD AUTO: 39.1 % (ref 40.7–50.3)
HGB BLD-MCNC: 12.2 G/DL (ref 13–17)
HGB UR QL STRIP.AUTO: ABNORMAL
HGB UR QL STRIP.AUTO: ABNORMAL
IMM GRANULOCYTES # BLD AUTO: 0.04 K/UL (ref 0–0.3)
IMM GRANULOCYTES NFR BLD: 0 %
KETONES UR STRIP-MCNC: ABNORMAL MG/DL
KETONES UR STRIP-MCNC: ABNORMAL MG/DL
LEUKOCYTE ESTERASE UR QL STRIP: ABNORMAL
LEUKOCYTE ESTERASE UR QL STRIP: ABNORMAL
LYMPHOCYTES NFR BLD: 0.87 K/UL (ref 1.1–3.7)
LYMPHOCYTES RELATIVE PERCENT: 8 % (ref 24–43)
MCH RBC QN AUTO: 26.6 PG (ref 25.2–33.5)
MCHC RBC AUTO-ENTMCNC: 31.2 G/DL (ref 28.4–34.8)
MCV RBC AUTO: 85.4 FL (ref 82.6–102.9)
MONOCYTES NFR BLD: 0.98 K/UL (ref 0.1–1.2)
MONOCYTES NFR BLD: 9 % (ref 3–12)
NEUTROPHILS NFR BLD: 81 % (ref 36–65)
NEUTS SEG NFR BLD: 8.74 K/UL (ref 1.5–8.1)
NITRITE UR QL STRIP: POSITIVE
NITRITE UR QL STRIP: POSITIVE
NRBC BLD-RTO: 0 PER 100 WBC
PH UR STRIP: 6.5 [PH] (ref 5–8)
PH UR STRIP: 6.5 [PH] (ref 5–8)
PLATELET # BLD AUTO: 263 K/UL (ref 138–453)
PMV BLD AUTO: 9 FL (ref 8.1–13.5)
POTASSIUM SERPL-SCNC: 3.8 MMOL/L (ref 3.7–5.3)
PROT UR STRIP-MCNC: ABNORMAL MG/DL
PROT UR STRIP-MCNC: ABNORMAL MG/DL
RBC # BLD AUTO: 4.58 M/UL (ref 4.21–5.77)
RBC # BLD: ABNORMAL 10*6/UL
RBC #/AREA URNS HPF: ABNORMAL /HPF (ref 0–2)
RBC #/AREA URNS HPF: NORMAL /HPF (ref 0–2)
SODIUM SERPL-SCNC: 137 MMOL/L (ref 135–144)
SP GR UR STRIP: 1.01 (ref 1–1.03)
SP GR UR STRIP: 1.02 (ref 1–1.03)
UROBILINOGEN UR STRIP-ACNC: ABNORMAL EU/DL (ref 0–1)
UROBILINOGEN UR STRIP-ACNC: ABNORMAL EU/DL (ref 0–1)
WBC #/AREA URNS HPF: ABNORMAL /HPF (ref 0–5)
WBC #/AREA URNS HPF: NORMAL /HPF (ref 0–5)
WBC OTHER # BLD: 10.9 K/UL (ref 3.5–11.3)

## 2024-08-22 PROCEDURE — 6370000000 HC RX 637 (ALT 250 FOR IP): Performed by: UROLOGY

## 2024-08-22 PROCEDURE — 3600000002 HC SURGERY LEVEL 2 BASE: Performed by: UROLOGY

## 2024-08-22 PROCEDURE — 2580000003 HC RX 258: Performed by: NURSE PRACTITIONER

## 2024-08-22 PROCEDURE — 6360000004 HC RX CONTRAST MEDICATION

## 2024-08-22 PROCEDURE — 74177 CT ABD & PELVIS W/CONTRAST: CPT

## 2024-08-22 PROCEDURE — 96374 THER/PROPH/DIAG INJ IV PUSH: CPT

## 2024-08-22 PROCEDURE — 6360000002 HC RX W HCPCS: Performed by: NURSE PRACTITIONER

## 2024-08-22 PROCEDURE — 82947 ASSAY GLUCOSE BLOOD QUANT: CPT

## 2024-08-22 PROCEDURE — 3600000012 HC SURGERY LEVEL 2 ADDTL 15MIN: Performed by: UROLOGY

## 2024-08-22 PROCEDURE — 87086 URINE CULTURE/COLONY COUNT: CPT

## 2024-08-22 PROCEDURE — G0378 HOSPITAL OBSERVATION PER HR: HCPCS

## 2024-08-22 PROCEDURE — 85025 COMPLETE CBC W/AUTO DIFF WBC: CPT

## 2024-08-22 PROCEDURE — 81001 URINALYSIS AUTO W/SCOPE: CPT

## 2024-08-22 PROCEDURE — 3700000001 HC ADD 15 MINUTES (ANESTHESIA): Performed by: UROLOGY

## 2024-08-22 PROCEDURE — 7100000000 HC PACU RECOVERY - FIRST 15 MIN: Performed by: UROLOGY

## 2024-08-22 PROCEDURE — 88304 TISSUE EXAM BY PATHOLOGIST: CPT

## 2024-08-22 PROCEDURE — 3700000000 HC ANESTHESIA ATTENDED CARE: Performed by: UROLOGY

## 2024-08-22 PROCEDURE — 51700 IRRIGATION OF BLADDER: CPT

## 2024-08-22 PROCEDURE — 2709999900 HC NON-CHARGEABLE SUPPLY: Performed by: UROLOGY

## 2024-08-22 PROCEDURE — 7100000001 HC PACU RECOVERY - ADDTL 15 MIN: Performed by: UROLOGY

## 2024-08-22 PROCEDURE — 2500000003 HC RX 250 WO HCPCS: Performed by: NURSE ANESTHETIST, CERTIFIED REGISTERED

## 2024-08-22 PROCEDURE — 99222 1ST HOSP IP/OBS MODERATE 55: CPT | Performed by: NURSE PRACTITIONER

## 2024-08-22 PROCEDURE — 2580000003 HC RX 258

## 2024-08-22 PROCEDURE — 6370000000 HC RX 637 (ALT 250 FOR IP): Performed by: NURSE PRACTITIONER

## 2024-08-22 PROCEDURE — 80048 BASIC METABOLIC PNL TOTAL CA: CPT

## 2024-08-22 PROCEDURE — 99285 EMERGENCY DEPT VISIT HI MDM: CPT

## 2024-08-22 PROCEDURE — 6360000002 HC RX W HCPCS: Performed by: ANESTHESIOLOGY

## 2024-08-22 PROCEDURE — 1200000000 HC SEMI PRIVATE

## 2024-08-22 PROCEDURE — 6360000002 HC RX W HCPCS: Performed by: NURSE ANESTHETIST, CERTIFIED REGISTERED

## 2024-08-22 PROCEDURE — 0TCB8ZZ EXTIRPATION OF MATTER FROM BLADDER, VIA NATURAL OR ARTIFICIAL OPENING ENDOSCOPIC: ICD-10-PCS | Performed by: UROLOGY

## 2024-08-22 RX ORDER — DILTIAZEM HYDROCHLORIDE 180 MG/1
360 CAPSULE, COATED, EXTENDED RELEASE ORAL NIGHTLY
Status: DISCONTINUED | OUTPATIENT
Start: 2024-08-22 | End: 2024-08-23 | Stop reason: HOSPADM

## 2024-08-22 RX ORDER — HYDROMORPHONE HYDROCHLORIDE 1 MG/ML
0.5 INJECTION, SOLUTION INTRAMUSCULAR; INTRAVENOUS; SUBCUTANEOUS EVERY 5 MIN PRN
Status: DISCONTINUED | OUTPATIENT
Start: 2024-08-22 | End: 2024-08-22 | Stop reason: HOSPADM

## 2024-08-22 RX ORDER — MORPHINE SULFATE 2 MG/ML
2 INJECTION, SOLUTION INTRAMUSCULAR; INTRAVENOUS EVERY 4 HOURS PRN
Status: DISCONTINUED | OUTPATIENT
Start: 2024-08-22 | End: 2024-08-23 | Stop reason: HOSPADM

## 2024-08-22 RX ORDER — SODIUM CHLORIDE 9 MG/ML
INJECTION, SOLUTION INTRAVENOUS PRN
Status: DISCONTINUED | OUTPATIENT
Start: 2024-08-22 | End: 2024-08-22 | Stop reason: HOSPADM

## 2024-08-22 RX ORDER — MULTIVITAMIN WITH IRON
1 TABLET ORAL DAILY
Status: DISCONTINUED | OUTPATIENT
Start: 2024-08-22 | End: 2024-08-23 | Stop reason: HOSPADM

## 2024-08-22 RX ORDER — SODIUM CHLORIDE 0.9 % (FLUSH) 0.9 %
5-40 SYRINGE (ML) INJECTION PRN
Status: DISCONTINUED | OUTPATIENT
Start: 2024-08-22 | End: 2024-08-23 | Stop reason: HOSPADM

## 2024-08-22 RX ORDER — SODIUM CHLORIDE 0.9 % (FLUSH) 0.9 %
5-40 SYRINGE (ML) INJECTION PRN
Status: DISCONTINUED | OUTPATIENT
Start: 2024-08-22 | End: 2024-08-22 | Stop reason: HOSPADM

## 2024-08-22 RX ORDER — SODIUM CHLORIDE 0.9 % (FLUSH) 0.9 %
5-40 SYRINGE (ML) INJECTION EVERY 12 HOURS SCHEDULED
Status: DISCONTINUED | OUTPATIENT
Start: 2024-08-22 | End: 2024-08-22 | Stop reason: HOSPADM

## 2024-08-22 RX ORDER — INSULIN LISPRO 100 [IU]/ML
0-4 INJECTION, SOLUTION INTRAVENOUS; SUBCUTANEOUS
Status: DISCONTINUED | OUTPATIENT
Start: 2024-08-22 | End: 2024-08-23 | Stop reason: HOSPADM

## 2024-08-22 RX ORDER — NALOXONE HYDROCHLORIDE 0.4 MG/ML
INJECTION, SOLUTION INTRAMUSCULAR; INTRAVENOUS; SUBCUTANEOUS PRN
Status: DISCONTINUED | OUTPATIENT
Start: 2024-08-22 | End: 2024-08-22 | Stop reason: HOSPADM

## 2024-08-22 RX ORDER — MORPHINE SULFATE 4 MG/ML
4 INJECTION, SOLUTION INTRAMUSCULAR; INTRAVENOUS
Status: CANCELLED | OUTPATIENT
Start: 2024-08-22

## 2024-08-22 RX ORDER — PROCHLORPERAZINE EDISYLATE 5 MG/ML
10 INJECTION INTRAMUSCULAR; INTRAVENOUS
Status: DISCONTINUED | OUTPATIENT
Start: 2024-08-22 | End: 2024-08-22 | Stop reason: HOSPADM

## 2024-08-22 RX ORDER — IOPAMIDOL 755 MG/ML
75 INJECTION, SOLUTION INTRAVASCULAR
Status: COMPLETED | OUTPATIENT
Start: 2024-08-22 | End: 2024-08-22

## 2024-08-22 RX ORDER — LOSARTAN POTASSIUM 25 MG/1
25 TABLET ORAL DAILY
Status: DISCONTINUED | OUTPATIENT
Start: 2024-08-23 | End: 2024-08-23

## 2024-08-22 RX ORDER — LIDOCAINE HYDROCHLORIDE 20 MG/ML
INJECTION, SOLUTION EPIDURAL; INFILTRATION; INTRACAUDAL; PERINEURAL PRN
Status: DISCONTINUED | OUTPATIENT
Start: 2024-08-22 | End: 2024-08-22 | Stop reason: SDUPTHER

## 2024-08-22 RX ORDER — ONDANSETRON 4 MG/1
4 TABLET, ORALLY DISINTEGRATING ORAL EVERY 8 HOURS PRN
Status: DISCONTINUED | OUTPATIENT
Start: 2024-08-22 | End: 2024-08-23 | Stop reason: HOSPADM

## 2024-08-22 RX ORDER — MEPERIDINE HYDROCHLORIDE 50 MG/ML
12.5 INJECTION INTRAMUSCULAR; INTRAVENOUS; SUBCUTANEOUS EVERY 5 MIN PRN
Status: DISCONTINUED | OUTPATIENT
Start: 2024-08-22 | End: 2024-08-22 | Stop reason: HOSPADM

## 2024-08-22 RX ORDER — SODIUM CHLORIDE 9 MG/ML
INJECTION, SOLUTION INTRAVENOUS CONTINUOUS
Status: DISCONTINUED | OUTPATIENT
Start: 2024-08-22 | End: 2024-08-23

## 2024-08-22 RX ORDER — HYDRALAZINE HYDROCHLORIDE 50 MG/1
50 TABLET, FILM COATED ORAL EVERY 8 HOURS SCHEDULED
Status: DISCONTINUED | OUTPATIENT
Start: 2024-08-22 | End: 2024-08-23 | Stop reason: HOSPADM

## 2024-08-22 RX ORDER — POLYETHYLENE GLYCOL 3350 17 G/17G
17 POWDER, FOR SOLUTION ORAL DAILY PRN
Status: DISCONTINUED | OUTPATIENT
Start: 2024-08-22 | End: 2024-08-23 | Stop reason: HOSPADM

## 2024-08-22 RX ORDER — OXYCODONE HYDROCHLORIDE 5 MG/1
5 TABLET ORAL
Status: DISCONTINUED | OUTPATIENT
Start: 2024-08-22 | End: 2024-08-22 | Stop reason: HOSPADM

## 2024-08-22 RX ORDER — ONDANSETRON 2 MG/ML
4 INJECTION INTRAMUSCULAR; INTRAVENOUS EVERY 6 HOURS PRN
Status: DISCONTINUED | OUTPATIENT
Start: 2024-08-22 | End: 2024-08-23 | Stop reason: HOSPADM

## 2024-08-22 RX ORDER — SODIUM CHLORIDE 0.9 % (FLUSH) 0.9 %
10 SYRINGE (ML) INJECTION PRN
Status: DISCONTINUED | OUTPATIENT
Start: 2024-08-22 | End: 2024-08-23 | Stop reason: HOSPADM

## 2024-08-22 RX ORDER — METOCLOPRAMIDE HYDROCHLORIDE 5 MG/ML
10 INJECTION INTRAMUSCULAR; INTRAVENOUS
Status: DISCONTINUED | OUTPATIENT
Start: 2024-08-22 | End: 2024-08-22 | Stop reason: HOSPADM

## 2024-08-22 RX ORDER — DEXTROSE MONOHYDRATE 100 MG/ML
INJECTION, SOLUTION INTRAVENOUS CONTINUOUS PRN
Status: DISCONTINUED | OUTPATIENT
Start: 2024-08-22 | End: 2024-08-23 | Stop reason: HOSPADM

## 2024-08-22 RX ORDER — LIDOCAINE HYDROCHLORIDE 20 MG/ML
20 JELLY TOPICAL ONCE
Status: DISCONTINUED | OUTPATIENT
Start: 2024-08-22 | End: 2024-08-23 | Stop reason: HOSPADM

## 2024-08-22 RX ORDER — DIPHENHYDRAMINE HYDROCHLORIDE 50 MG/ML
12.5 INJECTION INTRAMUSCULAR; INTRAVENOUS
Status: DISCONTINUED | OUTPATIENT
Start: 2024-08-22 | End: 2024-08-22 | Stop reason: HOSPADM

## 2024-08-22 RX ORDER — SODIUM CHLORIDE 9 MG/ML
INJECTION, SOLUTION INTRAVENOUS PRN
Status: DISCONTINUED | OUTPATIENT
Start: 2024-08-22 | End: 2024-08-23 | Stop reason: HOSPADM

## 2024-08-22 RX ORDER — SODIUM CHLORIDE 0.9 % (FLUSH) 0.9 %
5-40 SYRINGE (ML) INJECTION EVERY 12 HOURS SCHEDULED
Status: DISCONTINUED | OUTPATIENT
Start: 2024-08-22 | End: 2024-08-23 | Stop reason: HOSPADM

## 2024-08-22 RX ORDER — MORPHINE SULFATE 2 MG/ML
2 INJECTION, SOLUTION INTRAMUSCULAR; INTRAVENOUS
Status: CANCELLED | OUTPATIENT
Start: 2024-08-22

## 2024-08-22 RX ORDER — LIDOCAINE HYDROCHLORIDE 20 MG/ML
JELLY TOPICAL PRN
Status: DISCONTINUED | OUTPATIENT
Start: 2024-08-22 | End: 2024-08-22 | Stop reason: ALTCHOICE

## 2024-08-22 RX ORDER — FENTANYL CITRATE 50 UG/ML
INJECTION, SOLUTION INTRAMUSCULAR; INTRAVENOUS PRN
Status: DISCONTINUED | OUTPATIENT
Start: 2024-08-22 | End: 2024-08-22 | Stop reason: SDUPTHER

## 2024-08-22 RX ORDER — FENTANYL CITRATE 50 UG/ML
25 INJECTION, SOLUTION INTRAMUSCULAR; INTRAVENOUS EVERY 5 MIN PRN
Status: DISCONTINUED | OUTPATIENT
Start: 2024-08-22 | End: 2024-08-22 | Stop reason: HOSPADM

## 2024-08-22 RX ORDER — PROPOFOL 10 MG/ML
INJECTION, EMULSION INTRAVENOUS CONTINUOUS PRN
Status: DISCONTINUED | OUTPATIENT
Start: 2024-08-22 | End: 2024-08-22 | Stop reason: SDUPTHER

## 2024-08-22 RX ORDER — 0.9 % SODIUM CHLORIDE 0.9 %
80 INTRAVENOUS SOLUTION INTRAVENOUS ONCE
Status: COMPLETED | OUTPATIENT
Start: 2024-08-22 | End: 2024-08-22

## 2024-08-22 RX ORDER — INSULIN LISPRO 100 [IU]/ML
0-4 INJECTION, SOLUTION INTRAVENOUS; SUBCUTANEOUS NIGHTLY
Status: DISCONTINUED | OUTPATIENT
Start: 2024-08-22 | End: 2024-08-23 | Stop reason: HOSPADM

## 2024-08-22 RX ADMIN — IOPAMIDOL 75 ML: 755 INJECTION, SOLUTION INTRAVENOUS at 05:52

## 2024-08-22 RX ADMIN — Medication 25 MG: at 16:55

## 2024-08-22 RX ADMIN — DILTIAZEM HYDROCHLORIDE 360 MG: 180 CAPSULE, EXTENDED RELEASE ORAL at 21:07

## 2024-08-22 RX ADMIN — SODIUM CHLORIDE, PRESERVATIVE FREE 10 ML: 5 INJECTION INTRAVENOUS at 05:53

## 2024-08-22 RX ADMIN — SODIUM CHLORIDE: 9 INJECTION, SOLUTION INTRAVENOUS at 14:41

## 2024-08-22 RX ADMIN — PROPOFOL 75 MCG/KG/MIN: 10 INJECTION, EMULSION INTRAVENOUS at 16:33

## 2024-08-22 RX ADMIN — Medication 25 MG: at 16:45

## 2024-08-22 RX ADMIN — HYDRALAZINE HYDROCHLORIDE 50 MG: 25 TABLET ORAL at 15:23

## 2024-08-22 RX ADMIN — SODIUM CHLORIDE: 9 INJECTION, SOLUTION INTRAVENOUS at 11:15

## 2024-08-22 RX ADMIN — WATER 1000 MG: 1 INJECTION INTRAMUSCULAR; INTRAVENOUS; SUBCUTANEOUS at 11:25

## 2024-08-22 RX ADMIN — SODIUM CHLORIDE 80 ML: 9 INJECTION, SOLUTION INTRAVENOUS at 05:53

## 2024-08-22 RX ADMIN — FENTANYL CITRATE 50 MCG: 50 INJECTION INTRAMUSCULAR; INTRAVENOUS at 16:33

## 2024-08-22 RX ADMIN — FENTANYL CITRATE 25 MCG: 50 INJECTION INTRAMUSCULAR; INTRAVENOUS at 17:54

## 2024-08-22 RX ADMIN — FENTANYL CITRATE 50 MCG: 50 INJECTION INTRAMUSCULAR; INTRAVENOUS at 16:45

## 2024-08-22 RX ADMIN — LIDOCAINE HYDROCHLORIDE 60 MG: 20 INJECTION, SOLUTION EPIDURAL; INFILTRATION; INTRACAUDAL; PERINEURAL at 16:33

## 2024-08-22 RX ADMIN — HYDROMORPHONE HYDROCHLORIDE 0.5 MG: 1 INJECTION, SOLUTION INTRAMUSCULAR; INTRAVENOUS; SUBCUTANEOUS at 18:06

## 2024-08-22 RX ADMIN — HYDRALAZINE HYDROCHLORIDE 50 MG: 25 TABLET ORAL at 21:07

## 2024-08-22 RX ADMIN — FENTANYL CITRATE 25 MCG: 50 INJECTION INTRAMUSCULAR; INTRAVENOUS at 17:40

## 2024-08-22 ASSESSMENT — ENCOUNTER SYMPTOMS
SHORTNESS OF BREATH: 0
ABDOMINAL PAIN: 0
COLOR CHANGE: 0
WHEEZING: 0
CHEST TIGHTNESS: 0
BACK PAIN: 0
VOMITING: 0
NAUSEA: 0

## 2024-08-22 ASSESSMENT — PAIN DESCRIPTION - LOCATION
LOCATION: PENIS

## 2024-08-22 ASSESSMENT — PAIN SCALES - GENERAL
PAINLEVEL_OUTOF10: 1
PAINLEVEL_OUTOF10: 8
PAINLEVEL_OUTOF10: 6
PAINLEVEL_OUTOF10: 6

## 2024-08-22 ASSESSMENT — PAIN DESCRIPTION - DESCRIPTORS
DESCRIPTORS: SORE

## 2024-08-22 ASSESSMENT — PAIN - FUNCTIONAL ASSESSMENT: PAIN_FUNCTIONAL_ASSESSMENT: 0-10

## 2024-08-22 NOTE — CONSULTS
Facundo Marshall MD  Urology Consultation    Patient:  Akbar Chaudhari  MRN: [id number]  YOB: 1955    CHIEF COMPLAINT: Gross hematuria    HISTORY OF PRESENT ILLNESS:   The patient is a 69 y.o. male who presents with urinary retention gross hematuria, the patient has an indwelling Ogden that was placed yesterday for management of bleeding patient continues to bleed and he is clot retention requiring insertion of a three-way Ogden    Patient's old records, notes and chart reviewed and summarized above.    Past Medical History:    Past Medical History:   Diagnosis Date    Anesthesia     POSTOP NAUSEA AND VOMITING    Cataracts, bilateral     Elevated PSA     Ogden catheter in place     Hypertension     Kidney problem     STATES HAD DIALYSIS IN AUGUST 2018 AFTER HE COULDN'T URINATE BECAUSE OF HIS PROSTATE    Kidney stone     1990'S    Seasonal allergies     Sepsis (HCC)     SVT (supraventricular tachycardia) (HCC) 2018    DR. ALMONTE    Wears glasses     DISTANCE       Past Surgical History:    Past Surgical History:   Procedure Laterality Date    CYSTOSCOPY      CYSTOSCOPY  09/07/2018    with ogden exchange    EYE SURGERY      RADIOKERATOTOMY    IN CYSTOURETHROSCOPY N/A 11/6/2018    CYSTOSCOPY, REMOVAL AND REINSERTION OF CATHETAR performed by Facundo Marshall MD at [facility] OR    IN UNLISTED PROCEDURE MALE GENITAL SYSTEM N/A 9/7/2018    CYSTOSCOPY PROSTATE BIOPSY WITH ULTRASOUND performed by Facundo Marshall MD at [facility] OR    PROSTATE BIOPSY  09/07/2018    SUPRAPUBIC CATHETER PLACEMENT N/A 12/20/2018    SUPRAPUBIC CATHETER INSERTION performed by Elijah Burgess MD at [facility] OR    TURP N/A 12/20/2018    CYSTOSCOPY, GREENLIGHT XPS LASER PROSTATE  ([facility] #[id number] CATERINA) performed by Elijah Burgess MD at [facility] OR    VARICOCELECTOMY       Previous  surgery: Greenlight laser prostate surgery  Medications:    Scheduled Meds:   lidocaine  20 mL Topical Once     Continuous Infusions:  PRN Meds:.sodium chloride flush    Allergies:   Lisinopril    Social History:    Social History     Socioeconomic History    Marital status:      Spouse name: Not on file    Number of children: Not on file    Years of education: Not on file    Highest education level: Not on file   Occupational History    Not on file   Tobacco Use    Smoking status: Never    Smokeless tobacco: Never   Substance and Sexual Activity    Alcohol use: Yes     Comment: occasionally    Drug use: No    Sexual activity: Not on file   Other Topics Concern    Not on file   Social History Narrative    Not on file     Social Determinants of Health     Financial Resource Strain: Not on file   Food Insecurity: Not on file   Transportation Needs: Not on file   Physical Activity: Not on file   Stress: Not on file   Social Connections: Not on file   Intimate Partner Violence: Not on file   Housing Stability: Not on file       Family History:    Family History   Problem Relation Age of Onset    Hypertension Mother     Diabetes Father     Parkinsonism Father     Alzheimer's Disease Father     Alzheimer's Disease Maternal Grandmother     Cancer Maternal Grandfather     Cancer Paternal Grandmother     COPD Paternal Grandfather      Previous Urologic Family history: none  REVIEW OF SYSTEMS:  Constitutional: negative  Eyes: negative  Respiratory: negative  Cardiovascular: negative  Gastrointestinal: negative  Genitourinary: see HPI  Musculoskeletal: negative  Skin: negative   Neurological: negative  Hematological/Lymphatic: negative  Psychological: negative    Physical Exam:      This a 69 y.o. female   Patient Vitals for the past 24 hrs:   BP Temp Temp src Pulse Resp SpO2 Height Weight   08/22/24 0445 (!) 158/88 98.1 °F (36.7 °C) Oral -- -- -- 1.829 m (6') 108.9 kg (240 lb)   08/22/24 0443 -- -- -- 86 16 96 % -- --     Constitutional: Patient in no acute distress.  Neuro: Alert and oriented to person, place and time.  Psych: mood and affect normal  HEENT negative  Lungs: Respiratory effort is  normal  Cardiovascular: Normal peripheral pulses  Abdomen: Soft, non-tender, non-distended with no CVA, flank pain or hepatosplenomegaly.  No hernias.  Kidneys normal.  Lymphatics: No palpable lymphadenopathy.  Bladder catheter was changed in the emergency department,, the bladder was decompressed by three-way Whitmore and bladder irrigation with retrieval of significant amount of clot      LABS:   No results for input(s): \"WBC\", \"HGB\", \"HCT\", \"MCV\", \"PLT\" in the last 72 hours.  No results for input(s): \"NA\", \"K\", \"CL\", \"CO2\", \"PHOS\", \"BUN\", \"CREATININE\" in the last 72 hours.    Invalid input(s): \"CA\"    Additional Lab/culture results:    Urinalysis: No results for input(s): \"COLORU\", \"PHUR\", \"LABCAST\", \"WBCUA\", \"RBCUA\", \"MUCUS\", \"TRICHOMONAS\", \"YEAST\", \"BACTERIA\", \"CLARITYU\", \"SPECGRAV\", \"LEUKOCYTESUR\", \"UROBILINOGEN\", \"BILIRUBINUR\", \"BLOODU\" in the last 72 hours.    Invalid input(s): \"NITRATE\", \"GLUCOSEUKETONESUAMORPHOUS\"     -----------------------------------------------------------------  Imaging Results:      Assessment and Plan   Impression:    Patient Active Problem List   Diagnosis    Urinary retention    Elevated PSA    Hyperkalemia    Hypertension    Renal failure    Obstructive uropathy    Hydronephrosis    SVT (supraventricular tachycardia) (HCC)    Hypertrophy of prostate with urinary retention    Incomplete RBBB    New onset type 2 diabetes mellitus (HCC)    Urine retention    PSA elevation    Severe sepsis (HCC)    CKD (chronic kidney disease) stage 3, GFR 30-59 ml/min (HCC)    E. coli sepsis (HCC)    Urinary tract infection associated with indwelling urethral catheter (HCC)    Septicemia (HCC)    Hyperglycemia without ketosis    Seasonal allergies    Kidney stone    Kidney problem    TAMARA (acute kidney injury) (HCC)    Diabetic ketoacidosis without coma associated with type 2 diabetes mellitus (HCC)       Plan:  the patient will require cystoscopy bladder irrigation clot evacuation    Facundo Marshall,

## 2024-08-22 NOTE — OP NOTE
Operative Note      Patient: Akbar Chaudhari  YOB: 1955  MRN: [id number]    Date of Procedure: 8/22/2024    Pre-Op Diagnosis Codes:      * Gross hematuria [R31.0]   Urinary retention  Post-Op Diagnosis: Same and hemorrhagic cystitis acute prostatitis clot retention       Procedure(s):  CYSTOSCOPY EVACUATION OF CLOTS   Bladder irrigation  Surgeon(s):  Facundo Marshall MD    Assistant:   * No surgical staff found *    Anesthesia: Monitor Anesthesia Care    Estimated Blood Loss (mL): 200  blood clots in the bladder    Complications: None    Specimens:   ID Type Source Tests Collected by Time Destination   1 : urine Urine Urine, Cystoscopic URINALYSIS WITH REFLEX TO CULTURE Facundo Marshall MD 8/22/2024 1655    A : BLOOD CLOTS Tissue Bladder SURGICAL PATHOLOGY Facundo Marshall MD 8/22/2024 1700        Implants:  * No implants in log *      Drains:   Urinary Catheter 08/22/24 3 Way (Active)       [REMOVED] Urinary Catheter 08/22/24 3 Way (Removed)   $ Urethral catheter insertion $ Not inserted for procedure 08/22/24 0652   Catheter Indications Urinary retention (acute or chronic), continuous bladder irrigation or bladder outlet obstruction 08/22/24 1512   Site Assessment Bleeding;Swelling 08/22/24 1512   Urine Color Bloody 08/22/24 1512   Urine Appearance Clots 08/22/24 1512   Collection Container Standard 08/22/24 1401   Securement Method Securing device (Describe) 08/22/24 1401   Catheter Care  Perineal wipes 08/22/24 0652   Catheter Best Practices  Catheter secured to thigh;Drainage tube clipped to bed;Tamper seal intact;Lack of dependent loop in tubing;Bag not on floor;Bag below bladder;Drainage bag less than half full 08/22/24 1401   Status Continuous bladder irrigation 08/22/24 1401   $ Bladder irrigation simple- 1X PER DAY $ Yes 08/22/24 1401   Rate Moderate 08/22/24 1401   Irrigant Normal saline 08/22/24 1401   Output (mL) 1000 mL 08/22/24 0652       Findings:  Infection Present At Time Of Surgery (PATOS)

## 2024-08-22 NOTE — PROGRESS NOTES
[] Medication Reconciliation was completed and the patient's home medication list was verified. The Med List Status has been marked \"Complete\". The following sources were used to assist with Medication Reconciliation:    [] Patient had a list of medications which was transcribed into the EHR.    [] Patient provided bottles of their medications    [] Home medications reviewed and confirmed with     [] Contacted patient's pharmacy to confirm home medications    [] Contacted patient's physician office to confirm home medications    [] Medical Records from another facility and/or Care Everywhere were reviewed    OR    [x] There are one or more home medications that need clarification before Medication Reconciliation can be completed. The Med List Status has been marked as In Progress. To assist with Home Medication Reconciliation the following actions have been taken:    [x] Pharmacy medication reconciliation service requested. (Note: This can be done by sending a Perfect Serve message to [facility] Pharmacist or by phoning [phone].)    [] Family requested to bring medications into the hospital    [] Family requested to call hospital with medication list    [] Message left with physician office    [] Request for medical records made to     [] Other

## 2024-08-22 NOTE — H&P
[facility]  Office: [phone]  Lam Torres DO, Douglas Mcdaniel DO, Tim Cano DO, Yung Parson DO, Jennifer Fuentes MD, Alem Segura MD, Phoebe Ornelas MD, Gloria Mary MD,  Chepe Posey MD, Tracey Burgess MD, Aletha Villatoro MD,  Abelardo Rodriguez DO, Barrett Carvalho MD, Joe Carolina MD, Del Torres DO, Yoon Nieves MD,  Corwin Acosta DO, Karena cShmid MD, Mayra Arias MD, Annabel Salter MD, Juan Antonio Chen MD,  Chato Ewing MD, Trang Crespo MD, Kvng Linn MD, Bethany Zapien MD, Timothy Akins MD, Surjit Lyman MD, Jesse Jane DO, Moises Berkowitz DO, Arnaldo Lim MD,  Kris Guerrero MD, Shirley Waterhouse, CNP,  Ida Sheikh, CNP, Cristóbal Rios, CNP,  Kayy Chapman, WANDA, Caryn Sosa, CNP, Jennifer Conner, CNP, Park Das CNP, Tonya Allison, CNP, Ladi Dan PA-C, Emily Garner PA-C, Laurence Veliz, CNP, Keith Pleitez, CNP, Giana Gibson, CNP, Nisha Clement, CNP, Sherly Phillips, CNS, Jazz Ureña, CNP, Melissa Dueñas CNP, Tracy Schwab, CNP         [facility]   IN-PATIENT SERVICE   [facility]    HISTORY AND PHYSICAL EXAMINATION            Date:   8/22/2024  Patient name:  Akbar Chaudhari  Date of admission:  8/22/2024  4:52 AM  MRN:   [id number]  Account:  [id number]  YOB: 1955  PCP:    Lisette Robles MD  Room:   [place] [address]  Code Status:    Prior    Chief Complaint:     Chief Complaint   Patient presents with    Urinary Retention     Current treatment for prostatitis and ogden placed yesterday per Dr Marshall. Last emptied bag at midnight tonight and concerned about lack of output       History Obtained From:     patient, electronic medical record    History of Present Illness:     Akbar Chaudhari is a 69 y.o. Non- / non  male who presents with Urinary Retention (Current treatment for prostatitis and ogden placed yesterday per Dr Marshall. Last emptied bag at midnight tonight and concerned about lack of  output)   and is admitted to the hospital for the management of Acute on chronic urinary retention.    The patient was seen at Dr. Marshall's  office yesterday and had a Whitmore catheter placed related to retention.  He states while he was in office they had a difficult time placing the catheter related to clots and ultimately had to place a bigger catheter.  He reports good urinary output after the Whitmore was placed, initially,  but when he woke up this morning the Whitmore catheter bag was empty and he had an urge to urinate so he came to the ER for evaluation.  He also reports  currently taking a second course of Cipro p.o. for proctitis.  According to the ER his Whitmore was irrigated with return of large amount of blood clots. CT abdomin showed Marked prostatic enlargement with transverse diameter at 8 cm, previously 7.2 cm. Findings are concerning for marked cystitis with hemorrhage into the bladder. There are bilateral moderate hydronephrosis and hydroureter without stone. Neoplasm can't be excluded.  Medical history includes BPH, proctitis elevated PSA, SVT, diabetes, and urinary retention/requiring catheter    Dr Marshall has been consulted; UA showed small amount leukocyte esterase, few bacteria, too many to count RBCs and was positive for nitrates.  Started on Rocephin.  Urine culture added    Past Medical History:     Past Medical History:   Diagnosis Date    Anesthesia     POSTOP NAUSEA AND VOMITING    Cataracts, bilateral     Elevated PSA     Whitmore catheter in place     Hypertension     Kidney problem     STATES HAD DIALYSIS IN AUGUST 2018 AFTER HE COULDN'T URINATE BECAUSE OF HIS PROSTATE    Kidney stone     1990'S    Seasonal allergies     Sepsis ([facility])     SVT (supraventricular tachycardia) ([facility]) 2018    DR. ALMONTE    Type 2 diabetes mellitus, without long-term current use of insulin ([facility]) 08/21/2018    Wears glasses     DISTANCE        Past Surgical History:     Past Surgical History:   Procedure Laterality Date     CYSTOSCOPY      CYSTOSCOPY  09/07/2018    with ogden exchange    EYE SURGERY      RADIOKERATOTOMY    SD CYSTOURETHROSCOPY N/A 11/6/2018    CYSTOSCOPY, REMOVAL AND REINSERTION OF CATHETAR performed by Facundo Marshall MD at [facility] OR    SD UNLISTED PROCEDURE MALE GENITAL SYSTEM N/A 9/7/2018    CYSTOSCOPY PROSTATE BIOPSY WITH ULTRASOUND performed by Facundo Marshall MD at [facility] OR    PROSTATE BIOPSY  09/07/2018    SUPRAPUBIC CATHETER PLACEMENT N/A 12/20/2018    SUPRAPUBIC CATHETER INSERTION performed by Elijah Burgess MD at [facility] OR    TURP N/A 12/20/2018    CYSTOSCOPY, GREENLIGHT XPS LASER PROSTATE  ([facility] #[id number] CATERINA) performed by Elijah Burgess MD at [facility] OR    VARICOCELECTOMY          Medications Prior to Admission:     Prior to Admission medications    Medication Sig Start Date End Date Taking? Authorizing Provider   diclofenac sodium (VOLTAREN) 1 % GEL Apply 4 g topically 4 times daily as needed for Pain 3/30/23   Tucker Mccoy MD   losartan (COZAAR) 25 MG tablet Take 25 mg by mouth daily    ProviderElodia MD   metFORMIN (GLUCOPHAGE-XR) 500 MG extended release tablet take 1 tablet by mouth every evening WITH A MEAL 9/13/21   Elodia Ortiz MD   diclofenac sodium (VOLTAREN) 1 % GEL Apply 2 g topically 4 times daily as needed for Pain 9/28/21   Tucker Mccoy MD   dilTIAZem (CARDIZEM CD) 180 MG extended release capsule 2 capsules    Elodia Ortiz MD   glipiZIDE (GLUCOTROL XL) 2.5 MG extended release tablet take 1 tablet by mouth every morning WITH BREAKFAST    Elodia Ortiz MD   hydrALAZINE (APRESOLINE) 50 MG tablet take 1 tablet by mouth three times a day with food    Elodia Ortiz MD   metFORMIN HCl  MG/5ML SRER take 1 tablet by mouth every evening with meals    Elodia Ortiz MD   Insulin Pen Needle ([employer] PEN NEEDLES) 31G X 6 MM MISC 1 each by Does not apply route daily 8/9/19   Jennifer Fuentes MD   cetirizine (ZYRTEC) 10 MG tablet Take 1 tablet by  mouth daily  Patient taking differently: Take 10 mg by mouth daily as needed for Allergies or Rhinitis  10/30/18   Jennifer Fuentes MD   Multiple Vitamins-Minerals (MULTIVITAMIN ADULT) TABS Take 1 tablet by mouth daily    ProviderElodia MD        Allergies:     Lisinopril    Social History:     Tobacco:    reports that he has never smoked. He has never used smokeless tobacco.  Alcohol:      reports current alcohol use.  Drug Use:  reports no history of drug use.    Family History:     Family History   Problem Relation Age of Onset    Hypertension Mother     Diabetes Father     Parkinsonism Father     Alzheimer's Disease Father     Alzheimer's Disease Maternal Grandmother     Cancer Maternal Grandfather     Cancer Paternal Grandmother     COPD Paternal Grandfather        Review of Systems:     Positive and Negative as described in HPI.    Review of Systems   Genitourinary:  Positive for decreased urine volume, difficulty urinating and hematuria.   All other systems reviewed and are negative.      Physical Exam:   BP (!) 149/86   Pulse 78   Temp 98.1 °F (36.7 °C) (Oral)   Resp 18   Ht 1.829 m (6')   Wt 108.9 kg (240 lb)   SpO2 93%   BMI 32.55 kg/m²   Temp (24hrs), Av.1 °F (36.7 °C), Min:98.1 °F (36.7 °C), Max:98.1 °F (36.7 °C)    Recent Labs     24  1126   POCGLU 185*       Intake/Output Summary (Last 24 hours) at 2024 1153  Last data filed at 2024 0652  Gross per 24 hour   Intake --   Output 1000 ml   Net -1000 ml       Physical Exam  Vitals and nursing note reviewed.   Constitutional:       Appearance: He is obese.   HENT:      Mouth/Throat:      Mouth: Mucous membranes are moist.   Eyes:      Conjunctiva/sclera: Conjunctivae normal.   Cardiovascular:      Rate and Rhythm: Normal rate and regular rhythm.      Pulses: Normal pulses.      Heart sounds: Normal heart sounds.   Pulmonary:      Effort: Pulmonary effort is normal.      Breath sounds: Normal breath sounds.   Abdominal:      insulin ([facility]) 8/22/2024 Yes    CKD (chronic kidney disease) stage 3, GFR 30-59 ml/min ([facility]) 8/22/2024 Yes    Proctitis 8/22/2024 Yes       Plan:     Patient status inpatient in the  Med/Surge    Acute on chronic urinary retention with acute cystitis and possible proctitis  Urine culture added  Start Rocephin and fluids  Monitor I&O  Mechanical DVT prophylaxis  Urology following; Whitmore management per urology    SVT  Continue home Cardizem  Monitor telemetry    History of CKD  Currently stable avoid nephrotoxic agents    Type 2 diabetes without long-term use of insulin  Check glucose before meals and at bedtime and cover with insulin sliding scale per protocol  Hypoglycemia protocol  Holding metformin related to CT of the abdomen with contrast  Will continue home glipizide when he's no longer npo          Consultations:   IP CONSULT TO UROLOGY  IP CONSULT TO INTERNAL MEDICINE    Patient is admitted as inpatient status because of co-morbidities listed above, severity of signs and symptoms as outlined, requirement for current medical therapies and most importantly because of direct risk to patient if care not provided in a hospital setting.  Expected length of stay > 48 hours.    LUCAS Lopez - CNP  8/22/2024  11:53 AM    Copy sent to Lisette eMna MD

## 2024-08-22 NOTE — ED PROVIDER NOTES
8:19 AM  Patient care signed out to me at shift change by Dr. Anne.  Patient currently has indwelling Whitmore catheter, presented to the ED for urinary retention, clots in the tubing.  Dr. Anne discussed case with urologist, recommends bladder irrigation which was done here in the ER, patient reports feeling improved.  CT scan of the abdomen/pelvis done and shows prostatic enlargement, irregular mural thickening of the bladder, prominent hyperdense material inside the bladder likely blood, bilateral moderate hydronephrosis.  Labs with normal creatinine.  Patient admitted to the hospitalist service for CBI, likely cystoscopy per urology.      ICD-10-CM    1. Urinary retention  R33.9       2. Gross hematuria  R31.0           Brennen Islas MD  Emergency Medicine     Brennen Islas MD  08/22/24 0821       Brennen Islas MD  08/22/24 0829

## 2024-08-22 NOTE — ED PROVIDER NOTES
[facility] ED  Emergency Department Encounter  Emergency Medicine Attending     Pt Name:Akbar Chaudhari  MRN: [id number]  Birthdate 1955  Date of evaluation: 8/22/24  PCP:  Lisette Robles MD  Note Started: 6:39 AM EDT      CHIEF COMPLAINT       Chief Complaint   Patient presents with    Urinary Retention     Current treatment for prostatitis and ogden placed yesterday per Dr Marshall. Last emptied bag at midnight tonight and concerned about lack of output       HISTORY OF PRESENT ILLNESS  (Location/Symptom, Timing/Onset, Context/Setting, Quality, Duration, Modifying Factors, Severity.)      69-year-old male with history of hypertension, nephrolithiasis presents for evaluation of urinary retention.  Patient has been undergoing treatment by urology Dr. Marshall for prostatitis.  He is currently on antibiotics.  He had been experiencing urinary retention and difficulties and a Ogden catheter was placed and urology office yesterday.  They advised him that if he has blood in his urine or is unable to produce normal amounts of urine that he should present to the emergency department.  Patient states that he went home yesterday evening and was tired and fell asleep in his chair.  He woke up around midnight and noticed that the urinary bag barely had any contents.  He was concern for retention at this time.  Denies any fevers or chills.  Patient did have CBC and metabolic panel that was performed yesterday that was overall stable with no leukocytosis and stable kidney function.            PAST MEDICAL / SURGICAL / SOCIAL / FAMILY HISTORY      has a past medical history of Anesthesia, Cataracts, bilateral, Elevated PSA, Ogden catheter in place, Hypertension, Kidney problem, Kidney stone, Seasonal allergies, Sepsis ([facility]), SVT (supraventricular tachycardia) ([facility]), and Wears glasses.     has a past surgical history that includes Cystoscopy; Cystoscopy (09/07/2018); Prostate biopsy (09/07/2018); pr unlisted procedure male  genital system (N/A, 9/7/2018); pr cystourethroscopy (N/A, 11/6/2018); Varicocelectomy; Eye surgery; TURP (N/A, 12/20/2018); and suprapubic catheter placement (N/A, 12/20/2018).    Social History     Socioeconomic History    Marital status:      Spouse name: Not on file    Number of children: Not on file    Years of education: Not on file    Highest education level: Not on file   Occupational History    Not on file   Tobacco Use    Smoking status: Never    Smokeless tobacco: Never   Substance and Sexual Activity    Alcohol use: Yes     Comment: occasionally    Drug use: No    Sexual activity: Not on file   Other Topics Concern    Not on file   Social History Narrative    Not on file     Social Determinants of Health     Financial Resource Strain: Not on file   Food Insecurity: Not on file   Transportation Needs: Not on file   Physical Activity: Not on file   Stress: Not on file   Social Connections: Not on file   Intimate Partner Violence: Not on file   Housing Stability: Not on file       Family History   Problem Relation Age of Onset    Hypertension Mother     Diabetes Father     Parkinsonism Father     Alzheimer's Disease Father     Alzheimer's Disease Maternal Grandmother     Cancer Maternal Grandfather     Cancer Paternal Grandmother     COPD Paternal Grandfather        Allergies:  Lisinopril    Home Medications:  Prior to Admission medications    Medication Sig Start Date End Date Taking? Authorizing Provider   diclofenac sodium (VOLTAREN) 1 % GEL Apply 4 g topically 4 times daily as needed for Pain 3/30/23   Tucker Mccoy MD   losartan (COZAAR) 25 MG tablet Take 25 mg by mouth daily    ProviderElodia MD   metFORMIN (GLUCOPHAGE-XR) 500 MG extended release tablet take 1 tablet by mouth every evening WITH A MEAL 9/13/21   ProviderElodia MD   diclofenac sodium (VOLTAREN) 1 % GEL Apply 2 g topically 4 times daily as needed for Pain 9/28/21   Tucker Mccoy MD   dilTIAZem (CARDIZEM  below, if available at the time of this note:    CT ABDOMEN PELVIS W IV CONTRAST Additional Contrast? None    (Results Pending)         EMERGENCY DEPARTMENT COURSE:      ED Course as of 08/22/24 0641   Thu Aug 22, 2024   0577 I did speak with urology Dr. Marshall who states that patient did have serum studies performed yesterday that were overall normal and does not require repeat serum studies.  He recommends CT with contrast and bladder irrigation and admission [SS]      ED Course User Index  [SS] Mulugeta Anne DO       Labs Reviewed - No data to display    PROCEDURES:    Procedures    CONSULTS:  IP CONSULT TO UROLOGY      FINAL IMPRESSION      No diagnosis found.    Pending at this time  DISPOSITION / PLAN     DISPOSITION    Condition at Disposition: Data Unavailable  Pending at this time, refer to morning physician documentation    PATIENT REFERRED TO:  No follow-up provider specified.    DISCHARGE MEDICATIONS:  New Prescriptions    No medications on file       Mulugeta Anne DO  Emergency Medicine Physician     (Please note that portions of thisnote were completed with a voice recognition program.  Efforts were made to edit the dictations but occasionally words are mis-transcribed.)        Mulugeta Anne DO  08/22/24 2018

## 2024-08-22 NOTE — ANESTHESIA PRE PROCEDURE
Date/Time    WBC 10.9 08/22/2024 07:40 AM    RBC 4.58 08/22/2024 07:40 AM    HGB 12.2 08/22/2024 07:40 AM    HCT 39.1 08/22/2024 07:40 AM    MCV 85.4 08/22/2024 07:40 AM    RDW 15.3 08/22/2024 07:40 AM     08/22/2024 07:40 AM       CMP:   Lab Results   Component Value Date/Time     08/22/2024 07:40 AM    K 3.8 08/22/2024 07:40 AM     08/22/2024 07:40 AM    CO2 20 08/22/2024 07:40 AM    BUN 23 08/22/2024 07:40 AM    CREATININE 1.0 08/22/2024 07:40 AM    GFRAA >60 01/19/2022 09:30 AM    LABGLOM 81 08/22/2024 07:40 AM    LABGLOM >60 03/18/2024 01:45 PM    GLUCOSE 174 08/22/2024 07:40 AM    CALCIUM 8.6 08/22/2024 07:40 AM    BILITOT 0.8 03/18/2024 01:45 PM    ALKPHOS 84 03/18/2024 01:45 PM    AST 19 03/18/2024 01:45 PM    ALT 18 03/18/2024 01:45 PM       POC Tests:   Recent Labs     08/22/24  1126   POCGLU 185*       Coags:   Lab Results   Component Value Date/Time    PROTIME 12.3 08/18/2018 04:50 PM    INR 1.2 08/18/2018 04:50 PM       HCG (If Applicable): No results found for: \"PREGTESTUR\", \"PREGSERUM\", \"HCG\", \"HCGQUANT\"     ABGs: No results found for: \"PHART\", \"PO2ART\", \"SSI1XCK\", \"RLF7DPE\", \"BEART\", \"G8IHESRE\"     Type & Screen (If Applicable):  No results found for: \"LABABO\"    Drug/Infectious Status (If Applicable):  Lab Results   Component Value Date/Time    HEPCAB NONREACTIVE 03/20/2024 03:00 PM       COVID-19 Screening (If Applicable): No results found for: \"COVID19\"        Anesthesia Evaluation  Patient summary reviewed and Nursing notes reviewed   no history of anesthetic complications:   Airway: Mallampati: II  TM distance: >3 FB   Neck ROM: full  Mouth opening: > = 3 FB   Dental: normal exam         Pulmonary:normal exam                               Cardiovascular:  Exercise tolerance: no interval change  (+) hypertension:, dysrhythmias: SVT    (-)  angina        Rate: normal                    Neuro/Psych:               GI/Hepatic/Renal:   (+) renal disease: kidney stones

## 2024-08-22 NOTE — PROGRESS NOTES
Pt admitted to room 2011.   Oriented to room, call light and bed mechanics. Side rails up x2. Call light within reach. Orders reviewed.

## 2024-08-22 NOTE — ED NOTES
Bladder irrigation completed with Shannan SUÁREZ at bedside with writer. Patient irrigated with 1000mL normal saline with large amount of blood clots returned in ogden catheter bag. Patient able to express the feeling of pressure when blood clots were going to put out. Patient tolerated irrigation well with no difficulties. 1400ml noted in ogden catheter bag, with urine obtained prior to irrigation, labeled and sent to lab.     Bed locked and in lowest position with call light in reach. Pt denies additional needs at this time.

## 2024-08-22 NOTE — ANESTHESIA POSTPROCEDURE EVALUATION
Department of Anesthesiology  Postprocedure Note    Patient: Akbar Chaudhari  MRN: [id number]  YOB: 1955  Date of evaluation: 8/22/2024    Procedure Summary       Date: 08/22/24 Room / Location: [facility]    Anesthesia Start: 1628 Anesthesia Stop: 1723    Procedure: CYSTOSCOPY EVACUATION OF CLOTS Diagnosis:       Gross hematuria      (Gross hematuria [R31.0])    Surgeons: Facundo Marshall MD Responsible Provider: Cal Bowie DO    Anesthesia Type: MAC ASA Status: 3            Anesthesia Type: No value filed.    Lucy Phase I:      Lucy Phase II:      Anesthesia Post Evaluation    Patient location during evaluation: PACU  Patient participation: complete - patient participated  Level of consciousness: awake and alert  Airway patency: patent  Nausea & Vomiting: no nausea and no vomiting  Cardiovascular status: hemodynamically stable  Respiratory status: acceptable  Hydration status: stable  Pain management: adequate    No notable events documented.

## 2024-08-23 VITALS
TEMPERATURE: 98.6 F | BODY MASS INDEX: 33.28 KG/M2 | OXYGEN SATURATION: 92 % | HEART RATE: 81 BPM | DIASTOLIC BLOOD PRESSURE: 69 MMHG | HEIGHT: 72 IN | WEIGHT: 245.7 LBS | SYSTOLIC BLOOD PRESSURE: 137 MMHG | RESPIRATION RATE: 18 BRPM

## 2024-08-23 LAB
ANION GAP SERPL CALCULATED.3IONS-SCNC: 11 MMOL/L (ref 9–17)
BASOPHILS # BLD: 0.06 K/UL (ref 0–0.2)
BASOPHILS NFR BLD: 1 % (ref 0–2)
BUN SERPL-MCNC: 14 MG/DL (ref 8–23)
BUN/CREAT SERPL: 18 (ref 9–20)
CALCIUM SERPL-MCNC: 7.8 MG/DL (ref 8.6–10.4)
CHLORIDE SERPL-SCNC: 105 MMOL/L (ref 98–107)
CO2 SERPL-SCNC: 21 MMOL/L (ref 20–31)
CREAT SERPL-MCNC: 0.8 MG/DL (ref 0.7–1.2)
EOSINOPHIL # BLD: 0.16 K/UL (ref 0–0.44)
EOSINOPHILS RELATIVE PERCENT: 2 % (ref 1–4)
ERYTHROCYTE [DISTWIDTH] IN BLOOD BY AUTOMATED COUNT: 15.2 % (ref 11.8–14.4)
GFR, ESTIMATED: >90 ML/MIN/1.73M2
GLUCOSE BLD-MCNC: 178 MG/DL (ref 75–110)
GLUCOSE SERPL-MCNC: 143 MG/DL (ref 70–99)
HCT VFR BLD AUTO: 40.3 % (ref 40.7–50.3)
HGB BLD-MCNC: 12.5 G/DL (ref 13–17)
IMM GRANULOCYTES # BLD AUTO: 0.06 K/UL (ref 0–0.3)
IMM GRANULOCYTES NFR BLD: 1 %
LYMPHOCYTES NFR BLD: 0.92 K/UL (ref 1.1–3.7)
LYMPHOCYTES RELATIVE PERCENT: 10 % (ref 24–43)
MCH RBC QN AUTO: 26.7 PG (ref 25.2–33.5)
MCHC RBC AUTO-ENTMCNC: 31 G/DL (ref 28.4–34.8)
MCV RBC AUTO: 85.9 FL (ref 82.6–102.9)
MICROORGANISM SPEC CULT: NO GROWTH
MICROORGANISM SPEC CULT: NO GROWTH
MONOCYTES NFR BLD: 1.09 K/UL (ref 0.1–1.2)
MONOCYTES NFR BLD: 12 % (ref 3–12)
NEUTROPHILS NFR BLD: 74 % (ref 36–65)
NEUTS SEG NFR BLD: 7.16 K/UL (ref 1.5–8.1)
NRBC BLD-RTO: 0 PER 100 WBC
PLATELET # BLD AUTO: 271 K/UL (ref 138–453)
PMV BLD AUTO: 9.2 FL (ref 8.1–13.5)
POTASSIUM SERPL-SCNC: 3.8 MMOL/L (ref 3.7–5.3)
RBC # BLD AUTO: 4.69 M/UL (ref 4.21–5.77)
RBC # BLD: ABNORMAL 10*6/UL
SERVICE CMNT-IMP: NORMAL
SERVICE CMNT-IMP: NORMAL
SODIUM SERPL-SCNC: 137 MMOL/L (ref 135–144)
SPECIMEN DESCRIPTION: NORMAL
SPECIMEN DESCRIPTION: NORMAL
WBC OTHER # BLD: 9.5 K/UL (ref 3.5–11.3)

## 2024-08-23 PROCEDURE — 6370000000 HC RX 637 (ALT 250 FOR IP): Performed by: UROLOGY

## 2024-08-23 PROCEDURE — G0378 HOSPITAL OBSERVATION PER HR: HCPCS

## 2024-08-23 PROCEDURE — 85025 COMPLETE CBC W/AUTO DIFF WBC: CPT

## 2024-08-23 PROCEDURE — 2580000003 HC RX 258: Performed by: UROLOGY

## 2024-08-23 PROCEDURE — 96374 THER/PROPH/DIAG INJ IV PUSH: CPT

## 2024-08-23 PROCEDURE — 6370000000 HC RX 637 (ALT 250 FOR IP): Performed by: NURSE PRACTITIONER

## 2024-08-23 PROCEDURE — 82947 ASSAY GLUCOSE BLOOD QUANT: CPT

## 2024-08-23 PROCEDURE — 99238 HOSP IP/OBS DSCHRG MGMT 30/<: CPT | Performed by: NURSE PRACTITIONER

## 2024-08-23 PROCEDURE — 6360000002 HC RX W HCPCS: Performed by: UROLOGY

## 2024-08-23 PROCEDURE — 80048 BASIC METABOLIC PNL TOTAL CA: CPT

## 2024-08-23 PROCEDURE — 36415 COLL VENOUS BLD VENIPUNCTURE: CPT

## 2024-08-23 RX ORDER — CEFUROXIME AXETIL 250 MG/1
250 TABLET ORAL 2 TIMES DAILY
Qty: 20 TABLET | Refills: 0 | Status: SHIPPED | OUTPATIENT
Start: 2024-08-23 | End: 2024-09-02

## 2024-08-23 RX ORDER — CIPROFLOXACIN 250 MG/1
250 TABLET, FILM COATED ORAL 2 TIMES DAILY
Status: ON HOLD | COMMUNITY
Start: 2024-08-20 | End: 2024-08-23 | Stop reason: HOSPADM

## 2024-08-23 RX ORDER — CEPHALEXIN 500 MG/1
500 CAPSULE ORAL 4 TIMES DAILY
Status: ON HOLD | COMMUNITY
Start: 2024-08-16 | End: 2024-08-23 | Stop reason: ALTCHOICE

## 2024-08-23 RX ORDER — GLIPIZIDE 5 MG/1
2.5 TABLET ORAL
Status: DISCONTINUED | OUTPATIENT
Start: 2024-08-23 | End: 2024-08-23 | Stop reason: HOSPADM

## 2024-08-23 RX ORDER — HYDROCODONE BITARTRATE AND ACETAMINOPHEN 5; 325 MG/1; MG/1
1 TABLET ORAL EVERY 6 HOURS PRN
Qty: 12 TABLET | Refills: 0 | Status: SHIPPED | OUTPATIENT
Start: 2024-08-23 | End: 2024-08-26

## 2024-08-23 RX ORDER — ASPIRIN 81 MG/1
81 TABLET ORAL DAILY
COMMUNITY

## 2024-08-23 RX ADMIN — WATER 1000 MG: 1 INJECTION INTRAMUSCULAR; INTRAVENOUS; SUBCUTANEOUS at 12:46

## 2024-08-23 RX ADMIN — SODIUM CHLORIDE: 9 INJECTION, SOLUTION INTRAVENOUS at 00:10

## 2024-08-23 RX ADMIN — HYDRALAZINE HYDROCHLORIDE 50 MG: 25 TABLET ORAL at 14:28

## 2024-08-23 RX ADMIN — HYDRALAZINE HYDROCHLORIDE 50 MG: 25 TABLET ORAL at 05:24

## 2024-08-23 RX ADMIN — MULTIVITAMIN TABLET 1 TABLET: TABLET at 09:21

## 2024-08-23 RX ADMIN — GLIPIZIDE 2.5 MG: 5 TABLET ORAL at 09:21

## 2024-08-23 ASSESSMENT — PAIN SCALES - GENERAL: PAINLEVEL_OUTOF10: 0

## 2024-08-23 NOTE — CASE COMMUNICATION
Case Management Assessment  Initial Evaluation    Date/Time of Evaluation: 8/23/2024 12:33 PM  Assessment Completed by: Bonnie Cohen RN    If patient is discharged prior to next notation, then this note serves as note for discharge by case management.    Patient Name: Akbar Chaudhari                   YOB: 1955  Diagnosis: Urinary retention [R33.9]  Gross hematuria [R31.0]  Acute on chronic urinary retention [R33.9]                   Date / Time: 8/22/2024  4:52 AM    Patient Admission Status: Inpatient   Readmission Risk (Low < 19, Mod (19-27), High > 27): Readmission Risk Score: 9.1    Current PCP: Lisette Robles MD  PCP verified by CM? Yes    Chart Reviewed: Yes      History Provided by: Patient  Patient Orientation: Alert and Oriented    Patient Cognition: Alert    Hospitalization in the last 30 days (Readmission):  No    If yes, Readmission Assessment in  Navigator will be completed.    Advance Directives:      Code Status: Full Code   Patient's Primary Decision Maker is: Legal Next of Kin      Discharge Planning:    Patient lives with: Alone Type of Home: House  Primary Care Giver: Self  Patient Support Systems include: Children   Current Financial resources: Medicare  Current community resources:    Current services prior to admission: None            Current DME:              Type of Home Care services:  None    ADLS  Prior functional level: Independent in ADLs/IADLs  Current functional level: Independent in ADLs/IADLs    PT AM-PAC:   /24  OT AM-PAC:   /24    Family can provide assistance at DC: Yes  Would you like Case Management to discuss the discharge plan with any other family members/significant others, and if so, who? No  Plans to Return to Present Housing: Yes  Other Identified Issues/Barriers to RETURNING to current housing: n/a  Potential Assistance needed at discharge: N/A            Potential DME:    Patient expects to discharge to: House  Plan for transportation at

## 2024-08-23 NOTE — PROGRESS NOTES
Transitions of Care Pharmacy Service   Medication Review    The patient's list of current home medications has been reviewed.     Source(s) of information: spoke to patient and sure scripts     Based on information provided by the above source(s), I have updated the patient's home med list as described below.       I changed or updated the following medications on the patient's home medication list:  Discontinued cephALEXin (KEFLEX) 500 MG capsule  diclofenac sodium (VOLTAREN) 1 % GEL  losartan (COZAAR) 25 MG tablet     Added ciprofloxacin (CIPRO) 250 MG tablet  aspirin 81 MG EC tablet     Adjusted   metFORMIN (GLUCOPHAGE-XR) 500 MG extended release tablet  dilTIAZem (CARDIZEM CD) 180 MG extended release capsule     Other Notes None            Please feel free to call me with any questions about this encounter. Thank you.    This note will be reviewed and co-signed by the Transitions of Care Pharmacist. The pharmacist will review inpatient orders and contact the physician about any discrepancies.    Catie Alvarez, pharmacy technician  Transitions of [facility]  Phone:  [phone]  Fax: [phone]      Electronically signed by Catie Alvarez on 8/23/2024 at 1:00 PM       Prior to Admission medications    Medication Sig   ciprofloxacin (CIPRO) 250 MG tablet Take 1 tablet by mouth 2 times daily   aspirin 81 MG EC tablet   Take 1 tablet by mouth daily   metFORMIN (GLUCOPHAGE-XR) 500 MG extended release tablet Take 1 tablet by mouth 2 times daily (with meals)   dilTIAZem (CARDIZEM CD) 180 MG extended release capsule Take 2 capsules by mouth at bedtime   glipiZIDE (GLUCOTROL XL) 2.5 MG extended release tablet take 1 tablet by mouth every morning WITH BREAKFAST   hydrALAZINE (APRESOLINE) 50 MG tablet take 1 tablet by mouth three times a day with food   cetirizine (ZYRTEC) 10 MG tablet Take 1 tablet by mouth daily as needed for Allergies or Rhinitis     Multiple Vitamins-Minerals (MULTIVITAMIN ADULT) TABS

## 2024-08-23 NOTE — PROGRESS NOTES
Patient discharged home with ogden. Leg bag applied for comfort. IV removed. All belongings with patient. Patient instructed on irrigating ogden when needed and follow up with Dr Marshall. Patient drove himself. Wheeled to car by staff.

## 2024-08-23 NOTE — PROGRESS NOTES
CLINICAL PHARMACY NOTE: MEDS TO BEDS    Total # of Prescriptions Filled: 2   The following medications were delivered to the patient:  Norco 5-325  Cefuroxime 250mg    Additional Documentation:

## 2024-08-23 NOTE — PLAN OF CARE
Problem: Discharge Planning  Goal: Discharge to home or other facility with appropriate resources  8/23/2024 1134 by Alexander Lyon RN  Outcome: Adequate for Discharge     Problem: Pain  Goal: Verbalizes/displays adequate comfort level or baseline comfort level  8/23/2024 1134 by Alexander Lyon RN  Outcome: Adequate for Discharge     Problem: Chronic Conditions and Co-morbidities  Goal: Patient's chronic conditions and co-morbidity symptoms are monitored and maintained or improved  8/23/2024 1134 by Alexander Lyon RN  Outcome: Adequate for Discharge     Problem: Respiratory - Adult  Goal: Achieves optimal ventilation and oxygenation  Outcome: Adequate for Discharge     Problem: Gastrointestinal - Adult  Goal: Minimal or absence of nausea and vomiting  Outcome: Adequate for Discharge     Problem: Genitourinary - Adult  Goal: Urinary catheter remains patent  Outcome: Adequate for Discharge     Problem: Metabolic/Fluid and Electrolytes - Adult  Goal: Electrolytes maintained within normal limits  Outcome: Adequate for Discharge     Problem: Metabolic/Fluid and Electrolytes - Adult  Goal: Hemodynamic stability and optimal renal function maintained  Outcome: Adequate for Discharge     Problem: Hematologic - Adult  Goal: Maintains hematologic stability  Outcome: Adequate for Discharge

## 2024-08-23 NOTE — PROGRESS NOTES
[facility]  Office: [phone]  Lam Torres DO, Douglas Mcdaniel DO, Tim Cano DO, Yung Parson DO, Jennifer Fuentes MD, Alem Segura MD, Phoebe Ornelas MD, Gloria Mary MD,  Chepe Posey MD, Tracey Burgess MD, Aletha Villatoro MD,  Abelardo Rodriguez DO, Barrett Carvalho MD, Joe Carolina MD, Del Torres DO, Yoon Nieves MD,  Corwin Acosta DO, Karena Schmid MD, Mayra Arias MD, Annabel Salter MD, Juan Antonio Chen MD,  Chato Ewing MD, Trang Crespo MD, Kvng Linn MD, Bethany Zapien MD, Timothy Akins MD, Surjit Lyman MD, Jesse Jane DO, Moises Berkowitz DO, Arnaldo Lim MD,  Kris Guerrero MD, Shirley Waterhouse, CNP,  Ida Sheikh CNP, Cristóbal Rios, CNP,  Kayy Chapman, DNP, Caryn Sosa, CNP, Jennifer Conner, CNP, Park Das CNP, Tonya Allison, CNP, Ladi Dan, PA-C, Emily Garner PA-C, Laurence Veliz, CNP, Keith Pleitez, CNP, Giana Gibson, CNP, Nisha Clement, CNP, Sherly Phillips, CNS, Jazz Ureña, CNP, Melissa Dueñas CNP, Tracy Schwab, CNP         [facility]   IN-PATIENT SERVICE   [facility]    Progress Note    8/23/2024    7:55 AM    Name:   Akbar Chaudhari  MRN:     [id number]     Acct:      [id number]   Room:   2011/2011-02  IP Day:  1  Admit Date:  8/22/2024  4:52 AM    PCP:   Lisette Robles MD  Code Status:  Full Code    Subjective:     C/C:   Chief Complaint   Patient presents with    Urinary Retention     Current treatment for prostatitis and ogden placed yesterday per Dr Marshall. Last emptied bag at midnight tonight and concerned about lack of output     Interval History Status: improved.     He states immediately post operatively he had some pain/pressure but he feels much better now  His hgb is stable   No white count   Urine culture pending     Brief History:     Akbar Chaudhari is a 69 y.o. Non- / non  male who presents with Urinary Retention (Current treatment for prostatitis and ogden placed

## 2024-08-23 NOTE — DISCHARGE SUMMARY
[facility]  Office: [phone]  Lam Torres DO, Douglas Mcdaniel DO, Tim Cano DO, Yung Parson DO, Jennifer Fuentes MD, Alem Segura MD, Phoebe Ornelas MD, Gloria Mary MD,  Chepe Posey MD, Tracey Burgess MD, Aletha Villatoro MD,  Abelardo Rodriguez DO, Barrett Carvalho MD, Joe Carolina MD, Del Torres DO, Yoon Nieves MD,  Corwin Acosta DO, Karena Schmid MD, Mayra Arias MD, Annabel Salter MD, Juan Antonio Chen MD,  Chato Ewing MD, Trang Crespo MD, Kvng Linn MD, Bethany Zapien MD, Timothy Akins MD, Surjit Lyman MD, Jesse Jane DO, Moises Berkowitz DO, Arnaldo Lim MD,  Kris Guerrero MD, Shirley Waterhouse, CNP,  Ida Sheikh CNP, Cristóbal Rios, CNP,  Kayy Chapman, WANDA, Caryn Sosa, CNP, Jennifer Conner, CNP, Park Das CNP, Tonya Allison CNP, Ladi Dan PA-C, Emily Garner PA-C, Laurence Veliz, CNP, Keith Pleitez, CNP, Giana Gibson, CNP, Nisha Clement CNP, Sherly Phillips, CNS, Jazz Ureña, [facility], Melissa Dueñas CNP, Tracy Schwab, CNP         [facility]   IN-PATIENT SERVICE   [facility]    Discharge Summary     Patient ID: Akbar Chaudhari  :  1955   MRN: [id number]     ACCOUNT:  [id number]   Patient's PCP: Lisette Robles MD  Admit Date: 2024   Discharge Date: 2024   Length of Stay: 1  Code Status:  Full Code  Admitting Physician: Yung Parson DO  Discharge Physician: LUCAS Lopez - CNP     Active Discharge Diagnoses:     Hospital Problem Lists:  Principal Problem:    Acute on chronic urinary retention  Active Problems:    SVT (supraventricular tachycardia) ([facility])    Hypertrophy of prostate with urinary retention    Type 2 diabetes mellitus, without long-term current use of insulin ([facility])    CKD (chronic kidney disease) stage 3, GFR 30-59 ml/min ([facility])    Proctitis    Acute cystitis with hematuria  Resolved Problems:    * No resolved hospital problems. *      Admission Condition:   release capsule  Generic drug: dilTIAZem     cetirizine 10 MG tablet  Commonly known as: ZYRTEC  Take 1 tablet by mouth daily     glipiZIDE 2.5 MG extended release tablet  Commonly known as: GLUCOTROL XL     hydrALAZINE 50 MG tablet  Commonly known as: APRESOLINE     Insulin Pen Needle 31G X 6 MM Misc  Commonly known as: [employer] Pen Needles  1 each by Does not apply route daily     Multivitamin Adult Tabs            STOP taking these medications      cephALEXin 500 MG capsule  Commonly known as: KEFLEX     diclofenac sodium 1 % Gel  Commonly known as: VOLTAREN     insulin detemir 100 UNIT/ML injection pen  Commonly known as: Levemir FlexTouch     losartan 25 MG tablet  Commonly known as: COZAAR            ASK your doctor about these medications      aspirin 81 MG EC tablet     ciprofloxacin 250 MG tablet  Commonly known as: CIPRO               Where to Get Your Medications        These medications were sent to [facility] [address] [place] [address]  [address] [facility] -  [phone] - F [phone]  [address] [place] [id number]      Phone: [phone]   cefUROXime 250 MG tablet  HYDROcodone-acetaminophen 5-325 MG per tablet         No discharge procedures on file.    Time Spent on discharge is  20 mins in patient examination, evaluation, counseling as well as medication reconciliation, prescriptions for required medications, discharge plan and follow up.    Electronically signed by   LUCAS Lopez CNP  8/23/2024  2:38 PM      Thank you Lisette Mena MD for the opportunity to be involved in this patient's care.

## 2024-08-28 LAB — SURGICAL PATHOLOGY REPORT: NORMAL

## 2024-08-31 ENCOUNTER — APPOINTMENT (OUTPATIENT)
Dept: CT IMAGING | Age: 69
End: 2024-08-31
Payer: MEDICARE

## 2024-08-31 ENCOUNTER — HOSPITAL ENCOUNTER (OUTPATIENT)
Age: 69
Setting detail: OBSERVATION
Discharge: HOME | End: 2024-09-02
Attending: STUDENT IN AN ORGANIZED HEALTH CARE EDUCATION/TRAINING PROGRAM | Admitting: INTERNAL MEDICINE
Payer: MEDICARE

## 2024-08-31 DIAGNOSIS — R31.0 GROSS HEMATURIA: ICD-10-CM

## 2024-08-31 DIAGNOSIS — R33.9 URINARY RETENTION: Primary | ICD-10-CM

## 2024-08-31 LAB
ANION GAP SERPL CALCULATED.3IONS-SCNC: 12 MMOL/L (ref 9–17)
BASOPHILS # BLD: 0.09 K/UL (ref 0–0.2)
BASOPHILS NFR BLD: 1 % (ref 0–2)
BUN SERPL-MCNC: 25 MG/DL (ref 8–23)
BUN/CREAT SERPL: 23 (ref 9–20)
CALCIUM SERPL-MCNC: 8.6 MG/DL (ref 8.6–10.4)
CHLORIDE SERPL-SCNC: 107 MMOL/L (ref 98–107)
CO2 SERPL-SCNC: 19 MMOL/L (ref 20–31)
CREAT SERPL-MCNC: 1.1 MG/DL (ref 0.7–1.2)
EOSINOPHIL # BLD: 0.43 K/UL (ref 0–0.44)
EOSINOPHILS RELATIVE PERCENT: 6 % (ref 1–4)
ERYTHROCYTE [DISTWIDTH] IN BLOOD BY AUTOMATED COUNT: 14.6 % (ref 11.8–14.4)
GFR, ESTIMATED: 73 ML/MIN/1.73M2
GLUCOSE SERPL-MCNC: 114 MG/DL (ref 70–99)
HCT VFR BLD AUTO: 38.8 % (ref 40.7–50.3)
HGB BLD-MCNC: 12.1 G/DL (ref 13–17)
IMM GRANULOCYTES # BLD AUTO: 0.05 K/UL (ref 0–0.3)
IMM GRANULOCYTES NFR BLD: 1 %
LYMPHOCYTES NFR BLD: 1.08 K/UL (ref 1.1–3.7)
LYMPHOCYTES RELATIVE PERCENT: 14 % (ref 24–43)
MCH RBC QN AUTO: 26.2 PG (ref 25.2–33.5)
MCHC RBC AUTO-ENTMCNC: 31.2 G/DL (ref 28.4–34.8)
MCV RBC AUTO: 84 FL (ref 82.6–102.9)
MONOCYTES NFR BLD: 0.89 K/UL (ref 0.1–1.2)
MONOCYTES NFR BLD: 12 % (ref 3–12)
NEUTROPHILS NFR BLD: 66 % (ref 36–65)
NEUTS SEG NFR BLD: 5.04 K/UL (ref 1.5–8.1)
NRBC BLD-RTO: 0 PER 100 WBC
PLATELET # BLD AUTO: 339 K/UL (ref 138–453)
PMV BLD AUTO: 9.1 FL (ref 8.1–13.5)
POTASSIUM SERPL-SCNC: 4.1 MMOL/L (ref 3.7–5.3)
RBC # BLD AUTO: 4.62 M/UL (ref 4.21–5.77)
RBC # BLD: ABNORMAL 10*6/UL
SODIUM SERPL-SCNC: 138 MMOL/L (ref 135–144)
WBC OTHER # BLD: 7.6 K/UL (ref 3.5–11.3)

## 2024-08-31 PROCEDURE — 6370000000 HC RX 637 (ALT 250 FOR IP): Performed by: STUDENT IN AN ORGANIZED HEALTH CARE EDUCATION/TRAINING PROGRAM

## 2024-08-31 PROCEDURE — 85025 COMPLETE CBC W/AUTO DIFF WBC: CPT

## 2024-08-31 PROCEDURE — 74176 CT ABD & PELVIS W/O CONTRAST: CPT

## 2024-08-31 PROCEDURE — 80048 BASIC METABOLIC PNL TOTAL CA: CPT

## 2024-08-31 RX ORDER — LIDOCAINE HYDROCHLORIDE 20 MG/ML
5 JELLY TOPICAL ONCE
Status: COMPLETED | OUTPATIENT
Start: 2024-08-31 | End: 2024-08-31

## 2024-08-31 RX ADMIN — LIDOCAINE HYDROCHLORIDE 5 ML: 20 JELLY TOPICAL at 23:04

## 2024-08-31 ASSESSMENT — PAIN SCALES - GENERAL: PAINLEVEL_OUTOF10: 1

## 2024-08-31 ASSESSMENT — PAIN - FUNCTIONAL ASSESSMENT: PAIN_FUNCTIONAL_ASSESSMENT: NONE - DENIES PAIN

## 2024-09-01 ENCOUNTER — ANESTHESIA EVENT (OUTPATIENT)
Dept: OPERATING ROOM | Age: 69
End: 2024-09-01
Payer: MEDICARE

## 2024-09-01 ENCOUNTER — ANESTHESIA (OUTPATIENT)
Dept: OPERATING ROOM | Age: 69
End: 2024-09-01
Payer: MEDICARE

## 2024-09-01 PROBLEM — R33.9 URINARY RETENTION: Status: ACTIVE | Noted: 2024-09-01

## 2024-09-01 PROBLEM — R31.0 GROSS HEMATURIA: Status: ACTIVE | Noted: 2024-09-01

## 2024-09-01 LAB
ANION GAP SERPL CALCULATED.3IONS-SCNC: 12 MMOL/L (ref 9–17)
BACTERIA URNS QL MICRO: ABNORMAL
BASOPHILS # BLD: 0.13 K/UL (ref 0–0.2)
BASOPHILS NFR BLD: 1 % (ref 0–2)
BILIRUB UR QL STRIP: ABNORMAL
BUN SERPL-MCNC: 26 MG/DL (ref 8–23)
BUN/CREAT SERPL: 24 (ref 9–20)
CALCIUM SERPL-MCNC: 8.5 MG/DL (ref 8.6–10.4)
CHLORIDE SERPL-SCNC: 108 MMOL/L (ref 98–107)
CLARITY UR: ABNORMAL
CO2 SERPL-SCNC: 18 MMOL/L (ref 20–31)
COLOR UR: ABNORMAL
CREAT SERPL-MCNC: 1.1 MG/DL (ref 0.7–1.2)
EOSINOPHIL # BLD: 0 K/UL (ref 0–0.44)
EOSINOPHILS RELATIVE PERCENT: 0 % (ref 1–4)
EPI CELLS #/AREA URNS HPF: ABNORMAL /HPF (ref 0–5)
ERYTHROCYTE [DISTWIDTH] IN BLOOD BY AUTOMATED COUNT: 14.6 % (ref 11.8–14.4)
GFR, ESTIMATED: 73 ML/MIN/1.73M2
GLUCOSE BLD-MCNC: 137 MG/DL (ref 75–110)
GLUCOSE BLD-MCNC: 146 MG/DL (ref 75–110)
GLUCOSE BLD-MCNC: 149 MG/DL (ref 75–110)
GLUCOSE BLD-MCNC: 172 MG/DL (ref 75–110)
GLUCOSE BLD-MCNC: 248 MG/DL (ref 75–110)
GLUCOSE SERPL-MCNC: 211 MG/DL (ref 70–99)
GLUCOSE UR STRIP-MCNC: ABNORMAL MG/DL
HCT VFR BLD AUTO: 39.1 % (ref 40.7–50.3)
HGB BLD-MCNC: 12.1 G/DL (ref 13–17)
HGB UR QL STRIP.AUTO: ABNORMAL
IMM GRANULOCYTES # BLD AUTO: 0.13 K/UL (ref 0–0.3)
IMM GRANULOCYTES NFR BLD: 1 %
INR PPP: 1.1
KETONES UR STRIP-MCNC: ABNORMAL MG/DL
LACTATE BLDV-SCNC: 1.3 MMOL/L (ref 0.5–1.9)
LEUKOCYTE ESTERASE UR QL STRIP: ABNORMAL
LYMPHOCYTES NFR BLD: 0.64 K/UL (ref 1.1–3.7)
LYMPHOCYTES RELATIVE PERCENT: 5 % (ref 24–43)
MCH RBC QN AUTO: 26.1 PG (ref 25.2–33.5)
MCHC RBC AUTO-ENTMCNC: 30.9 G/DL (ref 28.4–34.8)
MCV RBC AUTO: 84.4 FL (ref 82.6–102.9)
MONOCYTES NFR BLD: 0.64 K/UL (ref 0.1–1.2)
MONOCYTES NFR BLD: 5 % (ref 3–12)
NEUTROPHILS NFR BLD: 88 % (ref 36–65)
NEUTS SEG NFR BLD: 11.26 K/UL (ref 1.5–8.1)
NITRITE UR QL STRIP: POSITIVE
NRBC BLD-RTO: 0 PER 100 WBC
PH UR STRIP: 7 [PH] (ref 5–8)
PLATELET # BLD AUTO: 355 K/UL (ref 138–453)
PMV BLD AUTO: 8.9 FL (ref 8.1–13.5)
POTASSIUM SERPL-SCNC: 4.1 MMOL/L (ref 3.7–5.3)
PROT UR STRIP-MCNC: ABNORMAL MG/DL
PROTHROMBIN TIME: 14.1 SEC (ref 11.5–14.2)
RBC # BLD AUTO: 4.63 M/UL (ref 4.21–5.77)
RBC #/AREA URNS HPF: ABNORMAL /HPF (ref 0–2)
SODIUM SERPL-SCNC: 138 MMOL/L (ref 135–144)
SP GR UR STRIP: 1.02 (ref 1–1.03)
UROBILINOGEN UR STRIP-ACNC: NORMAL EU/DL (ref 0–1)
WBC #/AREA URNS HPF: ABNORMAL /HPF (ref 0–5)
WBC OTHER # BLD: 12.8 K/UL (ref 3.5–11.3)

## 2024-09-01 PROCEDURE — 36415 COLL VENOUS BLD VENIPUNCTURE: CPT

## 2024-09-01 PROCEDURE — 80048 BASIC METABOLIC PNL TOTAL CA: CPT

## 2024-09-01 PROCEDURE — 97116 GAIT TRAINING THERAPY: CPT

## 2024-09-01 PROCEDURE — 3700000000 HC ANESTHESIA ATTENDED CARE: Performed by: UROLOGY

## 2024-09-01 PROCEDURE — 6360000002 HC RX W HCPCS

## 2024-09-01 PROCEDURE — 3600000002 HC SURGERY LEVEL 2 BASE: Performed by: UROLOGY

## 2024-09-01 PROCEDURE — 7100000001 HC PACU RECOVERY - ADDTL 15 MIN: Performed by: UROLOGY

## 2024-09-01 PROCEDURE — C1769 GUIDE WIRE: HCPCS | Performed by: UROLOGY

## 2024-09-01 PROCEDURE — 87086 URINE CULTURE/COLONY COUNT: CPT

## 2024-09-01 PROCEDURE — 83605 ASSAY OF LACTIC ACID: CPT

## 2024-09-01 PROCEDURE — 99285 EMERGENCY DEPT VISIT HI MDM: CPT

## 2024-09-01 PROCEDURE — G0378 HOSPITAL OBSERVATION PER HR: HCPCS

## 2024-09-01 PROCEDURE — 7100000000 HC PACU RECOVERY - FIRST 15 MIN: Performed by: UROLOGY

## 2024-09-01 PROCEDURE — APPSS45 APP SPLIT SHARED TIME 31-45 MINUTES

## 2024-09-01 PROCEDURE — 82947 ASSAY GLUCOSE BLOOD QUANT: CPT

## 2024-09-01 PROCEDURE — 2580000003 HC RX 258

## 2024-09-01 PROCEDURE — 6370000000 HC RX 637 (ALT 250 FOR IP): Performed by: UROLOGY

## 2024-09-01 PROCEDURE — 2709999900 HC NON-CHARGEABLE SUPPLY: Performed by: UROLOGY

## 2024-09-01 PROCEDURE — 96375 TX/PRO/DX INJ NEW DRUG ADDON: CPT

## 2024-09-01 PROCEDURE — 85025 COMPLETE CBC W/AUTO DIFF WBC: CPT

## 2024-09-01 PROCEDURE — 81001 URINALYSIS AUTO W/SCOPE: CPT

## 2024-09-01 PROCEDURE — 2580000003 HC RX 258: Performed by: UROLOGY

## 2024-09-01 PROCEDURE — 85610 PROTHROMBIN TIME: CPT

## 2024-09-01 PROCEDURE — 3700000001 HC ADD 15 MINUTES (ANESTHESIA): Performed by: UROLOGY

## 2024-09-01 PROCEDURE — 3600000012 HC SURGERY LEVEL 2 ADDTL 15MIN: Performed by: UROLOGY

## 2024-09-01 PROCEDURE — 2500000003 HC RX 250 WO HCPCS: Performed by: ANESTHESIOLOGY

## 2024-09-01 PROCEDURE — 6360000002 HC RX W HCPCS: Performed by: STUDENT IN AN ORGANIZED HEALTH CARE EDUCATION/TRAINING PROGRAM

## 2024-09-01 PROCEDURE — 2720000010 HC SURG SUPPLY STERILE: Performed by: UROLOGY

## 2024-09-01 PROCEDURE — 6360000002 HC RX W HCPCS: Performed by: ANESTHESIOLOGY

## 2024-09-01 PROCEDURE — 96374 THER/PROPH/DIAG INJ IV PUSH: CPT

## 2024-09-01 PROCEDURE — 99222 1ST HOSP IP/OBS MODERATE 55: CPT | Performed by: STUDENT IN AN ORGANIZED HEALTH CARE EDUCATION/TRAINING PROGRAM

## 2024-09-01 PROCEDURE — 51701 INSERT BLADDER CATHETER: CPT

## 2024-09-01 PROCEDURE — 6370000000 HC RX 637 (ALT 250 FOR IP)

## 2024-09-01 PROCEDURE — 97161 PT EVAL LOW COMPLEX 20 MIN: CPT

## 2024-09-01 RX ORDER — DEXTROSE MONOHYDRATE 100 MG/ML
INJECTION, SOLUTION INTRAVENOUS CONTINUOUS PRN
Status: DISCONTINUED | OUTPATIENT
Start: 2024-09-01 | End: 2024-09-02 | Stop reason: HOSPADM

## 2024-09-01 RX ORDER — SODIUM CHLORIDE 9 MG/ML
INJECTION, SOLUTION INTRAVENOUS PRN
Status: DISCONTINUED | OUTPATIENT
Start: 2024-09-01 | End: 2024-09-02 | Stop reason: HOSPADM

## 2024-09-01 RX ORDER — INSULIN LISPRO 100 [IU]/ML
0-4 INJECTION, SOLUTION INTRAVENOUS; SUBCUTANEOUS NIGHTLY
Status: DISCONTINUED | OUTPATIENT
Start: 2024-09-01 | End: 2024-09-02 | Stop reason: HOSPADM

## 2024-09-01 RX ORDER — ACETAMINOPHEN 650 MG/1
650 SUPPOSITORY RECTAL EVERY 6 HOURS PRN
Status: DISCONTINUED | OUTPATIENT
Start: 2024-09-01 | End: 2024-09-02 | Stop reason: HOSPADM

## 2024-09-01 RX ORDER — NALOXONE HYDROCHLORIDE 0.4 MG/ML
INJECTION, SOLUTION INTRAMUSCULAR; INTRAVENOUS; SUBCUTANEOUS PRN
Status: DISCONTINUED | OUTPATIENT
Start: 2024-09-01 | End: 2024-09-01 | Stop reason: HOSPADM

## 2024-09-01 RX ORDER — ACETAMINOPHEN 325 MG/1
650 TABLET ORAL EVERY 6 HOURS PRN
Status: DISCONTINUED | OUTPATIENT
Start: 2024-09-01 | End: 2024-09-02 | Stop reason: HOSPADM

## 2024-09-01 RX ORDER — MAGNESIUM SULFATE 1 G/100ML
1000 INJECTION INTRAVENOUS PRN
Status: DISCONTINUED | OUTPATIENT
Start: 2024-09-01 | End: 2024-09-02 | Stop reason: HOSPADM

## 2024-09-01 RX ORDER — LIDOCAINE HYDROCHLORIDE 20 MG/ML
JELLY TOPICAL PRN
Status: DISCONTINUED | OUTPATIENT
Start: 2024-09-01 | End: 2024-09-01 | Stop reason: ALTCHOICE

## 2024-09-01 RX ORDER — SODIUM CHLORIDE 0.9 % (FLUSH) 0.9 %
5-40 SYRINGE (ML) INJECTION EVERY 12 HOURS SCHEDULED
Status: DISCONTINUED | OUTPATIENT
Start: 2024-09-01 | End: 2024-09-01 | Stop reason: HOSPADM

## 2024-09-01 RX ORDER — METOCLOPRAMIDE HYDROCHLORIDE 5 MG/ML
10 INJECTION INTRAMUSCULAR; INTRAVENOUS
Status: DISCONTINUED | OUTPATIENT
Start: 2024-09-01 | End: 2024-09-01 | Stop reason: HOSPADM

## 2024-09-01 RX ORDER — ONDANSETRON 4 MG/1
4 TABLET, ORALLY DISINTEGRATING ORAL EVERY 8 HOURS PRN
Status: DISCONTINUED | OUTPATIENT
Start: 2024-09-01 | End: 2024-09-02 | Stop reason: HOSPADM

## 2024-09-01 RX ORDER — HYDRALAZINE HYDROCHLORIDE 20 MG/ML
5 INJECTION INTRAMUSCULAR; INTRAVENOUS EVERY 6 HOURS PRN
Status: DISCONTINUED | OUTPATIENT
Start: 2024-09-01 | End: 2024-09-02 | Stop reason: HOSPADM

## 2024-09-01 RX ORDER — SODIUM CHLORIDE 9 MG/ML
INJECTION, SOLUTION INTRAVENOUS PRN
Status: DISCONTINUED | OUTPATIENT
Start: 2024-09-01 | End: 2024-09-01 | Stop reason: HOSPADM

## 2024-09-01 RX ORDER — ONDANSETRON 2 MG/ML
4 INJECTION INTRAMUSCULAR; INTRAVENOUS EVERY 6 HOURS PRN
Status: DISCONTINUED | OUTPATIENT
Start: 2024-09-01 | End: 2024-09-02 | Stop reason: HOSPADM

## 2024-09-01 RX ORDER — HYDROMORPHONE HYDROCHLORIDE 1 MG/ML
0.5 INJECTION, SOLUTION INTRAMUSCULAR; INTRAVENOUS; SUBCUTANEOUS EVERY 5 MIN PRN
Status: DISCONTINUED | OUTPATIENT
Start: 2024-09-01 | End: 2024-09-01 | Stop reason: HOSPADM

## 2024-09-01 RX ORDER — ONDANSETRON 2 MG/ML
4 INJECTION INTRAMUSCULAR; INTRAVENOUS ONCE
Status: COMPLETED | OUTPATIENT
Start: 2024-09-01 | End: 2024-09-01

## 2024-09-01 RX ORDER — MORPHINE SULFATE 4 MG/ML
4 INJECTION, SOLUTION INTRAMUSCULAR; INTRAVENOUS ONCE
Status: COMPLETED | OUTPATIENT
Start: 2024-09-01 | End: 2024-09-01

## 2024-09-01 RX ORDER — HALOPERIDOL 5 MG/ML
1 INJECTION INTRAMUSCULAR
Status: DISCONTINUED | OUTPATIENT
Start: 2024-09-01 | End: 2024-09-01 | Stop reason: HOSPADM

## 2024-09-01 RX ORDER — DILTIAZEM HYDROCHLORIDE 180 MG/1
360 CAPSULE, COATED, EXTENDED RELEASE ORAL NIGHTLY
Status: DISCONTINUED | OUTPATIENT
Start: 2024-09-01 | End: 2024-09-02 | Stop reason: HOSPADM

## 2024-09-01 RX ORDER — SODIUM CHLORIDE 9 MG/ML
INJECTION, SOLUTION INTRAVENOUS CONTINUOUS
Status: DISCONTINUED | OUTPATIENT
Start: 2024-09-01 | End: 2024-09-01

## 2024-09-01 RX ORDER — POTASSIUM CHLORIDE 1500 MG/1
40 TABLET, EXTENDED RELEASE ORAL PRN
Status: DISCONTINUED | OUTPATIENT
Start: 2024-09-01 | End: 2024-09-02 | Stop reason: HOSPADM

## 2024-09-01 RX ORDER — MORPHINE SULFATE 2 MG/ML
2 INJECTION, SOLUTION INTRAMUSCULAR; INTRAVENOUS EVERY 4 HOURS PRN
Status: DISCONTINUED | OUTPATIENT
Start: 2024-09-01 | End: 2024-09-02 | Stop reason: HOSPADM

## 2024-09-01 RX ORDER — PROPOFOL 10 MG/ML
INJECTION, EMULSION INTRAVENOUS PRN
Status: DISCONTINUED | OUTPATIENT
Start: 2024-09-01 | End: 2024-09-01 | Stop reason: SDUPTHER

## 2024-09-01 RX ORDER — GLIPIZIDE 5 MG/1
2.5 TABLET ORAL
Status: DISCONTINUED | OUTPATIENT
Start: 2024-09-01 | End: 2024-09-02 | Stop reason: HOSPADM

## 2024-09-01 RX ORDER — LIDOCAINE HYDROCHLORIDE 20 MG/ML
INJECTION, SOLUTION EPIDURAL; INFILTRATION; INTRACAUDAL; PERINEURAL PRN
Status: DISCONTINUED | OUTPATIENT
Start: 2024-09-01 | End: 2024-09-01 | Stop reason: SDUPTHER

## 2024-09-01 RX ORDER — HYDRALAZINE HYDROCHLORIDE 50 MG/1
50 TABLET, FILM COATED ORAL EVERY 8 HOURS SCHEDULED
Status: DISCONTINUED | OUTPATIENT
Start: 2024-09-01 | End: 2024-09-02 | Stop reason: HOSPADM

## 2024-09-01 RX ORDER — SODIUM CHLORIDE 0.9 % (FLUSH) 0.9 %
5-40 SYRINGE (ML) INJECTION PRN
Status: DISCONTINUED | OUTPATIENT
Start: 2024-09-01 | End: 2024-09-01 | Stop reason: HOSPADM

## 2024-09-01 RX ORDER — LIDOCAINE HYDROCHLORIDE 20 MG/ML
JELLY TOPICAL PRN
Status: DISCONTINUED | OUTPATIENT
Start: 2024-09-01 | End: 2024-09-02 | Stop reason: HOSPADM

## 2024-09-01 RX ORDER — MORPHINE SULFATE 2 MG/ML
1 INJECTION, SOLUTION INTRAMUSCULAR; INTRAVENOUS EVERY 4 HOURS PRN
Status: DISCONTINUED | OUTPATIENT
Start: 2024-09-01 | End: 2024-09-02 | Stop reason: HOSPADM

## 2024-09-01 RX ORDER — INSULIN LISPRO 100 [IU]/ML
0-4 INJECTION, SOLUTION INTRAVENOUS; SUBCUTANEOUS
Status: DISCONTINUED | OUTPATIENT
Start: 2024-09-01 | End: 2024-09-02 | Stop reason: HOSPADM

## 2024-09-01 RX ORDER — OXYCODONE HYDROCHLORIDE 5 MG/1
5 TABLET ORAL
Status: DISCONTINUED | OUTPATIENT
Start: 2024-09-01 | End: 2024-09-01 | Stop reason: HOSPADM

## 2024-09-01 RX ORDER — POLYETHYLENE GLYCOL 3350 17 G/17G
17 POWDER, FOR SOLUTION ORAL DAILY PRN
Status: DISCONTINUED | OUTPATIENT
Start: 2024-09-01 | End: 2024-09-02 | Stop reason: HOSPADM

## 2024-09-01 RX ORDER — FENTANYL CITRATE 50 UG/ML
25 INJECTION, SOLUTION INTRAMUSCULAR; INTRAVENOUS EVERY 5 MIN PRN
Status: DISCONTINUED | OUTPATIENT
Start: 2024-09-01 | End: 2024-09-01 | Stop reason: HOSPADM

## 2024-09-01 RX ORDER — SODIUM CHLORIDE 0.9 % (FLUSH) 0.9 %
5-40 SYRINGE (ML) INJECTION EVERY 12 HOURS SCHEDULED
Status: DISCONTINUED | OUTPATIENT
Start: 2024-09-01 | End: 2024-09-02 | Stop reason: HOSPADM

## 2024-09-01 RX ORDER — FENTANYL CITRATE 50 UG/ML
INJECTION, SOLUTION INTRAMUSCULAR; INTRAVENOUS PRN
Status: DISCONTINUED | OUTPATIENT
Start: 2024-09-01 | End: 2024-09-01 | Stop reason: SDUPTHER

## 2024-09-01 RX ORDER — ENOXAPARIN SODIUM 100 MG/ML
30 INJECTION SUBCUTANEOUS 2 TIMES DAILY
Status: DISCONTINUED | OUTPATIENT
Start: 2024-09-02 | End: 2024-09-02 | Stop reason: HOSPADM

## 2024-09-01 RX ORDER — POTASSIUM CHLORIDE 7.45 MG/ML
10 INJECTION INTRAVENOUS PRN
Status: DISCONTINUED | OUTPATIENT
Start: 2024-09-01 | End: 2024-09-02 | Stop reason: HOSPADM

## 2024-09-01 RX ORDER — SODIUM CHLORIDE 0.9 % (FLUSH) 0.9 %
10 SYRINGE (ML) INJECTION PRN
Status: DISCONTINUED | OUTPATIENT
Start: 2024-09-01 | End: 2024-09-02 | Stop reason: HOSPADM

## 2024-09-01 RX ADMIN — FENTANYL CITRATE 100 MCG: 50 INJECTION INTRAMUSCULAR; INTRAVENOUS at 13:54

## 2024-09-01 RX ADMIN — PROPOFOL 25 MG: 10 INJECTION, EMULSION INTRAVENOUS at 14:17

## 2024-09-01 RX ADMIN — FENTANYL CITRATE 50 MCG: 50 INJECTION INTRAMUSCULAR; INTRAVENOUS at 14:17

## 2024-09-01 RX ADMIN — PROPOFOL 25 MG: 10 INJECTION, EMULSION INTRAVENOUS at 14:00

## 2024-09-01 RX ADMIN — HYDRALAZINE HYDROCHLORIDE 50 MG: 50 TABLET ORAL at 16:57

## 2024-09-01 RX ADMIN — HYDRALAZINE HYDROCHLORIDE 50 MG: 50 TABLET ORAL at 05:23

## 2024-09-01 RX ADMIN — MORPHINE SULFATE 4 MG: 4 INJECTION, SOLUTION INTRAMUSCULAR; INTRAVENOUS at 02:06

## 2024-09-01 RX ADMIN — SODIUM CHLORIDE, PRESERVATIVE FREE 10 ML: 5 INJECTION INTRAVENOUS at 21:29

## 2024-09-01 RX ADMIN — SODIUM CHLORIDE: 9 INJECTION, SOLUTION INTRAVENOUS at 03:09

## 2024-09-01 RX ADMIN — LIDOCAINE HYDROCHLORIDE 5 ML: 20 INJECTION, SOLUTION EPIDURAL; INFILTRATION; INTRACAUDAL; PERINEURAL at 13:55

## 2024-09-01 RX ADMIN — WATER 1000 MG: 1 INJECTION INTRAMUSCULAR; INTRAVENOUS; SUBCUTANEOUS at 03:56

## 2024-09-01 RX ADMIN — Medication 20 MG: at 14:05

## 2024-09-01 RX ADMIN — Medication 10 MG: at 14:13

## 2024-09-01 RX ADMIN — PROPOFOL 25 MG: 10 INJECTION, EMULSION INTRAVENOUS at 13:55

## 2024-09-01 RX ADMIN — DILTIAZEM HYDROCHLORIDE 360 MG: 180 CAPSULE, COATED, EXTENDED RELEASE ORAL at 21:28

## 2024-09-01 RX ADMIN — HYDRALAZINE HYDROCHLORIDE 50 MG: 50 TABLET ORAL at 21:28

## 2024-09-01 RX ADMIN — LIDOCAINE HYDROCHLORIDE: 20 JELLY TOPICAL at 05:24

## 2024-09-01 RX ADMIN — ONDANSETRON 4 MG: 2 INJECTION INTRAMUSCULAR; INTRAVENOUS at 02:06

## 2024-09-01 RX ADMIN — PROPOFOL 25 MG: 10 INJECTION, EMULSION INTRAVENOUS at 14:13

## 2024-09-01 ASSESSMENT — PAIN DESCRIPTION - DESCRIPTORS: DESCRIPTORS: BURNING

## 2024-09-01 ASSESSMENT — PAIN DESCRIPTION - LOCATION
LOCATION: PENIS
LOCATION: PELVIS

## 2024-09-01 ASSESSMENT — PAIN SCALES - GENERAL
PAINLEVEL_OUTOF10: 3
PAINLEVEL_OUTOF10: 2

## 2024-09-01 ASSESSMENT — PAIN - FUNCTIONAL ASSESSMENT: PAIN_FUNCTIONAL_ASSESSMENT: FACE, LEGS, ACTIVITY, CRY, AND CONSOLABILITY (FLACC)

## 2024-09-01 NOTE — H&P
mg/dL    BUN 25 (H) 8 - 23 mg/dL    Creatinine 1.1 0.7 - 1.2 mg/dL    Est, Glom Filt Rate 73 >60 mL/min/1.73m2    BUN/Creatinine Ratio 23 (H) 9 - 20    Calcium 8.6 8.6 - 10.4 mg/dL   Urinalysis with Microscopic    Collection Time: 09/01/24  2:16 AM   Result Value Ref Range    Color, UA Red (A) Yellow    Turbidity UA Turbid (A) Clear    Glucose, Ur TRACE (A) NEGATIVE mg/dL    Bilirubin, Urine 1+ (A) NEGATIVE    Ketones, Urine TRACE (A) NEGATIVE mg/dL    Specific Gravity, UA 1.025 1.005 - 1.030    Urine Hgb 3+ (A) NEGATIVE    pH, Urine 7.0 5.0 - 8.0    Protein, UA 3+ (A) NEGATIVE mg/dL    Urobilinogen, Urine Normal 0.0 - 1.0 EU/dL    Nitrite, Urine POSITIVE (A) NEGATIVE    Leukocyte Esterase, Urine TRACE (A) NEGATIVE    WBC, UA 2 TO 5 0 - 5 /HPF    RBC, UA TOO NUMEROUS TO COUNT 0 - 2 /HPF    Epithelial Cells, UA 0 TO 2 0 - 5 /HPF    Bacteria, UA RARE (A) None       Imaging/Diagnostics:  CT ABDOMEN PELVIS WO CONTRAST Additional Contrast? None    Result Date: 8/31/2024  1. Markedly enlarged prostate with Whitmore catheter in bladder. 2. The current study, there appears to be a large heterogeneous dense structure, occupying the anteroinferior and midportion of the bladder measuring 7.9 x 7.6 x 7.4 cm, highly suggestive of a mass, less likely to be confluent blood clot. 3. Bilateral hydronephrosis and hydroureter, right more than left likely related to the bladder mass. 4. Mild fatty infiltration of the liver. 5. Diverticulosis coli without diverticulitis. 6. Small fat containing umbilical hernia. 7. Advanced DDD and facet osteoarthritis at L5-S1 level. Grade 1 spondylolisthesis at L5-S1 level.       Assessment :      Hospital Problems             Last Modified POA    * (Principal) Acute on chronic urinary retention 9/1/2024 Yes    Hypertension 9/1/2024 Yes    SVT (supraventricular tachycardia) (H[facility]) 9/1/2024 Yes    Type 2 diabetes mellitus, without long-term current use of insulin ([facility]) 9/1/2024 Yes    CKD (chronic kidney

## 2024-09-01 NOTE — ED NOTES
ED to inpatient nurses report     Chief Complaint   Patient presents with    Urinary Catheter     Issues with catheter not draining normally since yesterday.       Present to ED from home  LOC: alert and orientated to name, place, date  Vital signs   Vitals:    08/31/24 1938   BP: (!) 157/92   Pulse: 81   Resp: 12   Temp: 97.9 °F (36.6 °C)   SpO2: 96%   Weight: 108.9 kg (240 lb)   Height: 1.829 m (6')      Oxygen Baseline room air    Current needs required room air    LDAs:   Peripheral IV 08/31/24 Right Forearm (Active)     Mobility: Independent  Fall Risk:    Pending ED orders:   Present condition: Stable  Code Status: Full  Consults: IP CONSULT TO UROLOGY  IP CONSULT TO HOSPITALIST  [x]  Hospitalist  Completed  [x] yes [] no Who: Intermed  []  Medicine  Completed  [] yes [] No Who:   []  Cardiology  Completed  [] yes [] No Who:   []  GI   Completed  [] yes [] No Who:   []  Neurology  Completed  [] yes [] No Who:   []  Nephrology Completed  [] yes [] No Who:    []  Vascular  Completed  [] yes [] No Who:   []  Ortho  Completed  [] yes [] No Who:     []  Surgery  Completed  [] yes [] No Who:    [x]  Urology  Completed  [x] yes [] No Who:    []  CT Surgery Completed  [] yes [] No Who:   []  Podiatry  Completed  [] yes [] No Who:    []  Other    Completed  [] yes [] No Who:  Interventions: IV, labs, bladder scan, CT and mediation (Lidocaine).  Important Events:        Electronically signed by Meagan Garcia RN on 9/1/2024 at 1:17 AM

## 2024-09-01 NOTE — ED PROVIDER NOTES
UROLOGY  IP CONSULT TO HOSPITALIST    ED Course Notes From Epic Narrator:  ED Course as of 09/01/24 0314   Sat Aug 31, 2024   9575 Replacing Whitmore.  Unable to flush manually. [MS]   Sun Sep 01, 2024   0148 Patient having overflow incontinence is passing some urine but this was not measured.  Still having gross hematuria [MS]      ED Course User Index  [MS] Abhay Whitley DO         CRITICAL CARE:   0    PROCEDURES:  none    FINAL IMPRESSION      1. Urinary retention    2. Gross hematuria          DISPOSITION/PLAN   DISPOSITION Admitted 09/01/2024 02:07:32 AM  Condition at Disposition: Data Unavailable      OUTPATIENT FOLLOW UP THE PATIENT:  No follow-up provider specified.    DISCHARGE MEDICATIONS:  Current Discharge Medication List          Abhay Whitley DO  EmergencyMedicine Attending    (Please note that portions of this note were completed with a voice recognition program.  Efforts were made to edit the dictations but occasionally words are mis-transcribed.)     Abhay Whitley DO  09/01/24 0316

## 2024-09-01 NOTE — ED NOTES
Pt 24F 3 way ogden catheter removed per Dr. Whitley d/t not draining and ok to replace with 26F 3 way ogden catheter.

## 2024-09-01 NOTE — CONSULTS
Department of Urology  Urology Consult Note    Patient:  Akbar Chaudhari  MRN: [id number]  YOB: 1955    Reason for Consult:  hematuria, clot retention    CHIEF COMPLAINT:    Chief Complaint   Patient presents with    Urinary Catheter     Issues with catheter not draining normally since yesterday.        History Obtained From:   patient    HISTORY OF PRESENT ILLNESS:    The patient is a 69 y.o. male with significant past medical history of prostatitis who presents with hematuria and clot urine retention. Ogden was not draining well thus he presented to ed    Past Medical History:        Diagnosis Date    Anesthesia     POSTOP NAUSEA AND VOMITING    Cataracts, bilateral     Elevated PSA     Ogden catheter in place     Hypertension     Kidney problem     STATES HAD DIALYSIS IN AUGUST 2018 AFTER HE COULDN'T URINATE BECAUSE OF HIS PROSTATE    Kidney stone     1990'S    Seasonal allergies     Sepsis ([facility])     SVT (supraventricular tachycardia) ([facility]) 2018    DR. ALMONTE    Type 2 diabetes mellitus, without long-term current use of insulin ([facility]) 08/21/2018    Wears glasses     DISTANCE     Past Surgical History:        Procedure Laterality Date    CYSTOSCOPY      CYSTOSCOPY  09/07/2018    with ogden exchange    CYSTOSCOPY N/A 8/22/2024    CYSTOSCOPY EVACUATION OF CLOTS performed by Facundo Marshall MD at [facility] OR    EYE SURGERY      RADIOKERATOTOMY    IA CYSTOURETHROSCOPY N/A 11/6/2018    CYSTOSCOPY, REMOVAL AND REINSERTION OF CATHETAR performed by Facundo Marshall MD at [facility] OR    IA UNLISTED PROCEDURE MALE GENITAL SYSTEM N/A 9/7/2018    CYSTOSCOPY PROSTATE BIOPSY WITH ULTRASOUND performed by Facundo Marshall MD at [facility] OR    PROSTATE BIOPSY  09/07/2018    SUPRAPUBIC CATHETER PLACEMENT N/A 12/20/2018    SUPRAPUBIC CATHETER INSERTION performed by Elijah Burgess MD at [facility] OR    TURP N/A 12/20/2018    CYSTOSCOPY, GREENLIGHT XPS LASER PROSTATE  ([facility] #[id number] [place]) performed by Elijah Burgess MD at [facility] OR

## 2024-09-01 NOTE — BRIEF OP NOTE
Brief Postoperative Note      Patient: Akbar Chaudhari  YOB: 1955  MRN: [id number]    Date of Procedure: 9/1/2024    Pre-Op Diagnosis Codes:      * Urinary retention [R33.9]    Post-Op Diagnosis: Same       Procedure(s):  CYSTOSCOPY    Surgeon(s):  Soy Dutton MD    Assistant:  * No surgical staff found *    Anesthesia: General    Estimated Blood Loss (mL): Minimal    Complications: None    Specimens:   * No specimens in log *    Implants:  * No implants in log *      Drains:   Urinary Catheter 09/01/24 3 Way (Active)   Catheter Indications Urinary retention (acute or chronic), continuous bladder irrigation or bladder outlet obstruction 09/01/24 0530   Urine Color Cherry;Bloody 09/01/24 0530   Collection Container Standard 09/01/24 0530   Securement Method Securing device (Describe) 09/01/24 0530   Catheter Care  Soap and water 09/01/24 0530   Catheter Best Practices  Drainage tube clipped to bed;Catheter secured to thigh;Bag below bladder;Bag not on floor;Lack of dependent loop in tubing;Drainage bag less than half full 09/01/24 0530   Output (mL) 1500 mL 09/01/24 0627       [REMOVED] Urinary Catheter 08/22/24 3 Way (Removed)   $ Urethral catheter insertion $ Not inserted for procedure 08/22/24 0652   Catheter Indications Urinary retention (acute or chronic), continuous bladder irrigation or bladder outlet obstruction 08/22/24 1512   Site Assessment Bleeding;Swelling 08/22/24 1512   Urine Color Bloody 08/22/24 1512   Urine Appearance Clots 08/22/24 1512   Collection Container Standard 08/22/24 1401   Securement Method Securing device (Describe) 08/22/24 1401   Catheter Care  Perineal wipes 08/22/24 0652   Catheter Best Practices  Catheter secured to thigh;Drainage tube clipped to bed;Tamper seal intact;Lack of dependent loop in tubing;Bag not on floor;Bag below bladder;Drainage bag less than half full 08/22/24 1401   Status Continuous bladder irrigation 08/22/24 1401   $ Bladder irrigation simple- 1X PER

## 2024-09-01 NOTE — CARE COORDINATION
[facility] services if indicated. Plan is home, car in lot.    The Plan for Transition of Care is related to the following treatment goals of Urinary retention [R33.9]  Gross hematuria [R31.0]    IF APPLICABLE: The Patient and/or patient representative Akbar and his family were provided with a choice of provider and agrees with the discharge plan. Freedom of choice list with basic dialogue that supports the patient's individualized plan of care/goals and shares the quality data associated with the providers was provided to:     Patient Representative Name:       The Patient and/or Patient Representative Agree with the Discharge Plan?  Yes    Bonnie Cohen RN  Case Management Department

## 2024-09-02 VITALS
TEMPERATURE: 98.2 F | BODY MASS INDEX: 32.72 KG/M2 | HEIGHT: 72 IN | OXYGEN SATURATION: 92 % | HEART RATE: 72 BPM | RESPIRATION RATE: 16 BRPM | WEIGHT: 241.6 LBS | SYSTOLIC BLOOD PRESSURE: 143 MMHG | DIASTOLIC BLOOD PRESSURE: 79 MMHG

## 2024-09-02 PROBLEM — I47.10 SVT (SUPRAVENTRICULAR TACHYCARDIA) (HCC): Status: RESOLVED | Noted: 2018-08-19 | Resolved: 2024-09-02

## 2024-09-02 PROBLEM — N13.30 HYDRONEPHROSIS: Status: RESOLVED | Noted: 2018-08-19 | Resolved: 2024-09-02

## 2024-09-02 PROBLEM — R33.9 ACUTE ON CHRONIC URINARY RETENTION: Status: RESOLVED | Noted: 2024-08-22 | Resolved: 2024-09-02

## 2024-09-02 LAB
ANION GAP SERPL CALCULATED.3IONS-SCNC: 13 MMOL/L (ref 9–17)
BASOPHILS # BLD: 0.08 K/UL (ref 0–0.2)
BASOPHILS NFR BLD: 1 % (ref 0–2)
BUN SERPL-MCNC: 20 MG/DL (ref 8–23)
BUN/CREAT SERPL: 20 (ref 9–20)
CALCIUM SERPL-MCNC: 8.3 MG/DL (ref 8.6–10.4)
CHLORIDE SERPL-SCNC: 103 MMOL/L (ref 98–107)
CO2 SERPL-SCNC: 21 MMOL/L (ref 20–31)
CREAT SERPL-MCNC: 1 MG/DL (ref 0.7–1.2)
EOSINOPHIL # BLD: 0.46 K/UL (ref 0–0.44)
EOSINOPHILS RELATIVE PERCENT: 6 % (ref 1–4)
ERYTHROCYTE [DISTWIDTH] IN BLOOD BY AUTOMATED COUNT: 14.6 % (ref 11.8–14.4)
GFR, ESTIMATED: 81 ML/MIN/1.73M2
GLUCOSE BLD-MCNC: 139 MG/DL (ref 75–110)
GLUCOSE SERPL-MCNC: 317 MG/DL (ref 70–99)
HCT VFR BLD AUTO: 38.4 % (ref 40.7–50.3)
HGB BLD-MCNC: 11.7 G/DL (ref 13–17)
IMM GRANULOCYTES # BLD AUTO: 0.04 K/UL (ref 0–0.3)
IMM GRANULOCYTES NFR BLD: 1 %
LYMPHOCYTES NFR BLD: 1.14 K/UL (ref 1.1–3.7)
LYMPHOCYTES RELATIVE PERCENT: 14 % (ref 24–43)
MCH RBC QN AUTO: 26.1 PG (ref 25.2–33.5)
MCHC RBC AUTO-ENTMCNC: 30.5 G/DL (ref 28.4–34.8)
MCV RBC AUTO: 85.7 FL (ref 82.6–102.9)
MICROORGANISM SPEC CULT: NO GROWTH
MONOCYTES NFR BLD: 0.71 K/UL (ref 0.1–1.2)
MONOCYTES NFR BLD: 9 % (ref 3–12)
NEUTROPHILS NFR BLD: 71 % (ref 36–65)
NEUTS SEG NFR BLD: 5.82 K/UL (ref 1.5–8.1)
NRBC BLD-RTO: 0 PER 100 WBC
PLATELET # BLD AUTO: 325 K/UL (ref 138–453)
PMV BLD AUTO: 9.1 FL (ref 8.1–13.5)
POTASSIUM SERPL-SCNC: 3.8 MMOL/L (ref 3.7–5.3)
RBC # BLD AUTO: 4.48 M/UL (ref 4.21–5.77)
RBC # BLD: ABNORMAL 10*6/UL
SODIUM SERPL-SCNC: 137 MMOL/L (ref 135–144)
SPECIMEN DESCRIPTION: NORMAL
WBC OTHER # BLD: 8.3 K/UL (ref 3.5–11.3)

## 2024-09-02 PROCEDURE — 82947 ASSAY GLUCOSE BLOOD QUANT: CPT

## 2024-09-02 PROCEDURE — 36415 COLL VENOUS BLD VENIPUNCTURE: CPT

## 2024-09-02 PROCEDURE — 6360000002 HC RX W HCPCS: Performed by: UROLOGY

## 2024-09-02 PROCEDURE — 6370000000 HC RX 637 (ALT 250 FOR IP): Performed by: UROLOGY

## 2024-09-02 PROCEDURE — 80048 BASIC METABOLIC PNL TOTAL CA: CPT

## 2024-09-02 PROCEDURE — 99239 HOSP IP/OBS DSCHRG MGMT >30: CPT | Performed by: NURSE PRACTITIONER

## 2024-09-02 PROCEDURE — 96372 THER/PROPH/DIAG INJ SC/IM: CPT

## 2024-09-02 PROCEDURE — G0378 HOSPITAL OBSERVATION PER HR: HCPCS

## 2024-09-02 PROCEDURE — 96374 THER/PROPH/DIAG INJ IV PUSH: CPT

## 2024-09-02 PROCEDURE — 2580000003 HC RX 258: Performed by: UROLOGY

## 2024-09-02 PROCEDURE — 85025 COMPLETE CBC W/AUTO DIFF WBC: CPT

## 2024-09-02 RX ADMIN — GLIPIZIDE 2.5 MG: 5 TABLET ORAL at 09:25

## 2024-09-02 RX ADMIN — WATER 1000 MG: 1 INJECTION INTRAMUSCULAR; INTRAVENOUS; SUBCUTANEOUS at 04:10

## 2024-09-02 RX ADMIN — HYDRALAZINE HYDROCHLORIDE 50 MG: 50 TABLET ORAL at 05:47

## 2024-09-02 RX ADMIN — ENOXAPARIN SODIUM 30 MG: 100 INJECTION SUBCUTANEOUS at 09:25

## 2024-09-02 NOTE — PLAN OF CARE
Problem: Discharge Planning  Goal: Discharge to home or other facility with appropriate resources  9/2/2024 1146 by Dle Palmer, RN  Outcome: Adequate for Discharge     Problem: Chronic Conditions and Co-morbidities  Goal: Patient's chronic conditions and co-morbidity symptoms are monitored and maintained or improved  9/2/2024 1146 by Del Palmer, RN  Outcome: Adequate for Discharge     Problem: Pain  Goal: Verbalizes/displays adequate comfort level or baseline comfort level  9/2/2024 1146 by Del Palmer, RN  Outcome: Adequate for Discharge     
  Problem: Discharge Planning  Goal: Discharge to home or other facility with appropriate resources  Outcome: Progressing  Flowsheets  Taken 9/1/2024 1822  Discharge to home or other facility with appropriate resources:   Identify barriers to discharge with patient and caregiver   Arrange for needed discharge resources and transportation as appropriate   Identify discharge learning needs (meds, wound care, etc)   Refer to discharge planning if patient needs post-hospital services based on physician order or complex needs related to functional status, cognitive ability or social support system  Taken 9/1/2024 0742  Discharge to home or other facility with appropriate resources: Identify barriers to discharge with patient and caregiver     Problem: Chronic Conditions and Co-morbidities  Goal: Patient's chronic conditions and co-morbidity symptoms are monitored and maintained or improved  Outcome: Progressing  Flowsheets  Taken 9/1/2024 1822  Care Plan - Patient's Chronic Conditions and Co-Morbidity Symptoms are Monitored and Maintained or Improved:   Monitor and assess patient's chronic conditions and comorbid symptoms for stability, deterioration, or improvement   Collaborate with multidisciplinary team to address chronic and comorbid conditions and prevent exacerbation or deterioration  Taken 9/1/2024 0742  Care Plan - Patient's Chronic Conditions and Co-Morbidity Symptoms are Monitored and Maintained or Improved: Monitor and assess patient's chronic conditions and comorbid symptoms for stability, deterioration, or improvement     Problem: Pain  Goal: Verbalizes/displays adequate comfort level or baseline comfort level  Outcome: Progressing  Flowsheets (Taken 9/1/2024 1822)  Verbalizes/displays adequate comfort level or baseline comfort level:   Encourage patient to monitor pain and request assistance   Assess pain using appropriate pain scale   Administer analgesics based on type and severity of pain and evaluate 
Progressing  Flowsheets (Taken 9/1/2024 1185)  Verbalizes/displays adequate comfort level or baseline comfort level:   Encourage patient to monitor pain and request assistance   Assess pain using appropriate pain scale   Administer analgesics based on type and severity of pain and evaluate response   Implement non-pharmacological measures as appropriate and evaluate response   Notify Licensed Independent Practitioner if interventions unsuccessful or patient reports new pain

## 2024-09-02 NOTE — OP NOTE
[place] [address]                            OPERATIVE REPORT      PATIENT NAME: VANESA MOORE                  : 1955  MED REC NO: [id number]                         ROOM:   ACCOUNT NO: [id number]                       ADMIT DATE: 2024  PROVIDER: Soy Dutton MD      DATE OF PROCEDURE:  2024    SURGEON:  Soy Dutton MD    ASSISTANT:  None.    PREOPERATIVE DIAGNOSES:  Hematuria, clot urinary retention, prostate bleeding.    POSTOPERATIVE DIAGNOSES:  Hematuria, clot urinary retention, prostate bleeding.    PROCEDURE:  Cystoscopy, clot evacuation, fulguration.    ANESTHESIA:  General.    COMPLICATIONS:  None.    ESTIMATED BLOOD LOSS:  Minimal.    DESCRIPTION OF PROCEDURE:  This is a 69-year-old white male with history of prostatitis and very enlarged prostate with BPH.  He was having hematuria.  Thus, a Whitmore catheter was placed.  He was sent home with the Whitmore catheter.  Catheter plugged and could not be irrigated in the emergency room.  He was admitted and thus the above procedure was scheduled.  The risks and benefits were explained to the patient.  Informed consent was obtained.    This 69-year-old white male was brought back to the operating room, positioned in the modified dorsal lithotomy position after general anesthesia was started.  He was sterilely prepped and draped in standard fashion.  A 26-Kazakh rigid resectoscope with visual obturator was inserted into urethra and advanced into his bladder.  Bladder was examined.  There was a very large clot noted in the bladder.  I used an Ellik, and I removed the entire clot.  It was at least 8 cm.  There was at least 200 mL of clot in his bladder.  I reexamined the bladder.  There were no tumors or stones were noted.  There was no injury to the bladder.  I then identified some bleeding at the prostatic urethra.  I used the electric loop, and I fulgurated

## 2024-09-02 NOTE — PROGRESS NOTES
Facundo Marshall MD   Urology Progress Note            Subjective:  fllow-up gross hematuria clot retention    Patient Vitals for the past 24 hrs:   BP Temp Temp src Pulse Resp SpO2 Height Weight   09/02/24 0030 125/77 97.3 °F (36.3 °C) -- (!) 156 17 93 % -- --   09/01/24 1933 (!) 156/90 98.6 °F (37 °C) -- 83 18 93 % -- --   09/01/24 1517 (!) 166/94 97.3 °F (36.3 °C) Oral 72 16 95 % -- --   09/01/24 1500 (!) 142/81 97.5 °F (36.4 °C) Temporal 72 16 94 % -- --   09/01/24 1445 (!) 140/92 -- -- 66 18 94 % -- --   09/01/24 1437 (!) 152/84 97.7 °F (36.5 °C) Temporal 70 13 96 % -- --   09/01/24 1128 (!) 141/86 97.9 °F (36.6 °C) Oral 74 17 92 % -- --   09/01/24 0756 137/82 98.1 °F (36.7 °C) Oral 82 16 92 % -- --   09/01/24 0245 (!) 180/98 97.9 °F (36.6 °C) Oral 92 16 95 % 1.829 m (6') 109.6 kg (241 lb 9.6 oz)   09/01/24 0100 (!) 188/124 -- -- -- -- 96 % -- --       Intake/Output Summary (Last 24 hours) at 9/2/2024 0040  Last data filed at 9/1/2024 2007  Gross per 24 hour   Intake 702.7 ml   Output 3200 ml   Net -2497.3 ml       Recent Labs     08/31/24 2126 09/01/24 0444   WBC 7.6 12.8*   HGB 12.1* 12.1*   HCT 38.8* 39.1*   MCV 84.0 84.4    355     Recent Labs     08/31/24 2126 09/01/24 0444    138   K 4.1 4.1    108*   CO2 19* 18*   BUN 25* 26*   CREATININE 1.1 1.1       Recent Labs     09/01/24  0216   COLORU Red*   PHUR 7.0   WBCUA 2 TO 5   RBCUA TOO NUMEROUS TO COUNT   BACTERIA RARE*   LEUKOCYTESUR TRACE*   UROBILINOGEN Normal   BILIRUBINUR 1+*       Additional Lab/culture results:    Physical Exam:  patient presented with clot retention, he has an indwelling Whitmore, he has a markedly enlarged prostate, previously requiring a GreenLight laser.      The option of robotic simple prostatectomy discussed     Interval Imaging Findings:    Impression:    Patient Active Problem List   Diagnosis    Hyperkalemia    Hypertension    Hydronephrosis    SVT (supraventricular 
[facility]  Office: [phone]  Lam Torres, DO, Douglas Mcdaniel, DO, Tim Cano DO, Yung Parson, DO, Jennifer Fuentes MD, Alem Segura MD, Phoebe Ornelas MD, Gloria Mary MD,  Chepe Posey MD, Tracey Burgess MD, Aletha Villatoro MD,  Abelardo Rodriguez DO, Barrett Carvalho MD, Joe Carolina MD, Del Torres DO, Yoon Nieves MD,  Corwin Acosta DO, Karena Schmid MD, Mayra Arias MD, Annabel Salter MD, Juan Antonio Chen MD,  Chato Ewing MD, Trang Crespo MD, Kvng Linn MD, Bethany Zapien MD, Timothy Akins MD, Surjit Lyman MD, Cristóbal Ham, DO, Jesse Jane DO, Moises Berkowitz DO, Arnaldo Lim MD,  Kris Guerrero MD, Shirley Waterhouse, CNP,  Ida Sheikh, CNP, Cristóbal Rios, CNP,  Kayy Chapman, DNP, Caryn Sosa, CNP, Jennifer Conner, CNP, Park Das, CNP, Tonya Allison, CNP, Ladi Dan, PA-C, Emily Garner, PA-C, Laurence Veliz, CNP, Keith Pleitez, CNP,  Zehra De La O, CNP, Giana Gibson, CNP, Nisha Clement, CNP, Sherly Phillips, CNS, Jazz Ureña, CNP, Melissa Dueñas, CNP, Tracy Schwab, CNP         [facility]   IN-PATIENT SERVICE   [facility]    Progress Note    9/2/2024    9:40 AM    Name:   Akbar Chaudhari  MRN:     [id number]     Acct:      [id number]   Room:   2025/2025-01  IP Day:  0  Admit Date:  8/31/2024  7:56 PM    PCP:   Lisette Robles MD  Code Status:  Full Code    Subjective:     C/C:   Chief Complaint   Patient presents with    Urinary Catheter     Issues with catheter not draining normally since yesterday.      Interval History Status: improved.     Patient sitting up in chair, he states he feels good.  Three-way Whitmore in place.  Urine red-tinged but clear, no visible clots in Whitmore bag.  Vital signs stable    Brief History:     Akbar Chaudhari is a 69 y.o. male who presents with indwelling Whitmore that was not draining urine since Friday after and is admitted to the hospital for the management of Urinary retention. He 
Patient off unit for procedure  
Physical Therapy  Facility/Department: [facility]  Physical Therapy Initial Assessment    Name: Akbar Chaudhari  : 1955  MRN: [id number]  Date of Service: 2024    Discharge Recommendations:       PER HPI: Akbar Chaudhari is a 69 y.o. Non- / non  male who presents with Urinary Catheter (Issues with catheter not draining normally since yesterday. )   and is admitted to the hospital for the management of Urinary retention.     Patient presented to the emergency department with complaints of Whitmore catheter no longer draining.  He was recently discharged from our facility on 2022 after cystoscopy and three-way Whitmore insertion with urology services.  He states that his Whitmore leg bag had normal drainage on previous day of examination however it has not drained since Friday afternoon.  He also endorsed suprapubic discomfort describing as pressure as well as recurrent sensations to have a bowel movement.     A CT abdomen and pelvis revealed a markedly enlarged prostate with a Whitmore catheter in the bladder, a large heterogeneous 7.9 x 7.6 x 7.4 cm suggestive of a mass or blood clot, bilateral hydronephrosis and hydroureteronephrosis.     Multiple attempts were made to manually irrigate the patient's Whitmore catheter in the emergency department. Whitmore catheter was replaced with no improvement in drainage.  Urology was consulted with recommendations for straight cath in which patient stated he has had dribbles, worsening and pressure but slightly able to urinate.         Patient Diagnosis(es): The primary encounter diagnosis was Urinary retention. A diagnosis of Gross hematuria was also pertinent to this visit.  Past Medical History:  has a past medical history of Anesthesia, Cataracts, bilateral, Elevated PSA, Whitmore catheter in place, Hypertension, Kidney problem, Kidney stone, Seasonal allergies, Sepsis ([facility]), SVT (supraventricular tachycardia) ([facility]), Type 2 diabetes mellitus, without long-term current use 
Pt transferred back to room 2025 per bed in stable condition from PACU  Oriented to room and surroundings  Bed in lowest position, wheels locked, 2/4 side rails up  Call light in reach, room free of clutter, adequate lighting provided  Denies any further questions at this time  Instructed to call out with any questions/concerns/new onset of pain and/or n/v   White board updated  Continue to monitor with hourly rounding  Non-skid socks on/at bedside      
listening;Sustaining Presence/Ministry of presence;Prayer (assurance of)/[place]   Outcome Acceptance;Encouraged;Engaged in conversation;Expressed Gratitude   Plan and Referrals   Plan/Referrals Continue to visit, (comment)

## 2024-09-02 NOTE — DISCHARGE SUMMARY
stable    Hospital Stay:     Hospital Course:  Akbar Chaudhari is a 69 y.o. male who was admitted for the management of  Acute on chronic urinary retention , presented to ER with  indwelling Whitmore that was not draining urine since Friday. He was recently discharged from our facility on 8/23/2022 after cystoscopy and three-way Whitmore insertion with urology services.  CT abd/pelvis revealed a markedly enlarged prostate, a large heterogeneous 7.9 x 7.6 x 7.4 cm suggestive of a mass or blood clot, bilateral hydronephrosis and hydroureteronephrosis.     Multiple attempts made to manually irrigate his catheter which were unsuccessful.  Urology was consulted and he underwent cystoscopy and clot evacuation on 9/2024.  A three-way Whitmore was placed for continuous bladder irrigation    Patient's symptoms have significantly improved and urine is still red-tinged which is to be expected but clear.  His bladder irrigation was discontinued and switched to leg bag.  He will be discharged with Whitmore catheter and follow-up outpatient with urology.  He may resume his recently prescribed Ceftin until completion    Significant therapeutic interventions: See above    Significant Diagnostic Studies:   Labs / Micro:  CBC:   Lab Results   Component Value Date/Time    WBC 8.3 09/02/2024 10:09 AM    RBC 4.48 09/02/2024 10:09 AM    HGB 11.7 09/02/2024 10:09 AM    HCT 38.4 09/02/2024 10:09 AM    MCV 85.7 09/02/2024 10:09 AM    MCH 26.1 09/02/2024 10:09 AM    MCHC 30.5 09/02/2024 10:09 AM    RDW 14.6 09/02/2024 10:09 AM     09/02/2024 10:09 AM     BMP:    Lab Results   Component Value Date/Time    GLUCOSE 317 09/02/2024 10:09 AM     09/02/2024 10:09 AM    K 3.8 09/02/2024 10:09 AM     09/02/2024 10:09 AM    CO2 21 09/02/2024 10:09 AM    ANIONGAP 13 09/02/2024 10:09 AM    BUN 20 09/02/2024 10:09 AM    CREATININE 1.0 09/02/2024 10:09 AM    CALCIUM 8.3 09/02/2024 10:09 AM    LABGLOM 81 09/02/2024 10:09 AM    LABGLOM >60 03/18/2024

## 2024-09-08 ENCOUNTER — HOSPITAL ENCOUNTER (EMERGENCY)
Age: 69
Discharge: HOME | End: 2024-09-08
Attending: EMERGENCY MEDICINE
Payer: MEDICARE

## 2024-09-08 VITALS
DIASTOLIC BLOOD PRESSURE: 72 MMHG | SYSTOLIC BLOOD PRESSURE: 159 MMHG | RESPIRATION RATE: 18 BRPM | OXYGEN SATURATION: 96 % | TEMPERATURE: 97.5 F | BODY MASS INDEX: 32.55 KG/M2 | HEART RATE: 82 BPM | WEIGHT: 240 LBS

## 2024-09-08 DIAGNOSIS — Z87.898 HISTORY OF URINARY RETENTION: ICD-10-CM

## 2024-09-08 DIAGNOSIS — R31.0 GROSS HEMATURIA: ICD-10-CM

## 2024-09-08 DIAGNOSIS — T83.9XXA URINARY CATHETER COMPLICATION, INITIAL ENCOUNTER (HCC): Primary | ICD-10-CM

## 2024-09-08 PROCEDURE — 99282 EMERGENCY DEPT VISIT SF MDM: CPT

## 2024-09-08 ASSESSMENT — PAIN SCALES - GENERAL: PAINLEVEL_OUTOF10: 0

## 2024-09-08 ASSESSMENT — PAIN - FUNCTIONAL ASSESSMENT: PAIN_FUNCTIONAL_ASSESSMENT: 0-10

## 2024-09-12 ENCOUNTER — HOSPITAL ENCOUNTER (OUTPATIENT)
Age: 69
Setting detail: SPECIMEN
Discharge: HOME | End: 2024-09-12

## 2024-09-12 LAB
ANION GAP SERPL CALCULATED.3IONS-SCNC: 11 MMOL/L (ref 9–16)
BUN SERPL-MCNC: 15 MG/DL (ref 8–23)
CALCIUM SERPL-MCNC: 9.6 MG/DL (ref 8.6–10.4)
CHLORIDE SERPL-SCNC: 104 MMOL/L (ref 98–107)
CO2 SERPL-SCNC: 23 MMOL/L (ref 20–31)
CREAT SERPL-MCNC: 0.9 MG/DL (ref 0.7–1.2)
CREAT UR-MCNC: 92.7 MG/DL (ref 39–259)
ERYTHROCYTE [DISTWIDTH] IN BLOOD BY AUTOMATED COUNT: 14.3 % (ref 11.8–14.4)
EST. AVERAGE GLUCOSE BLD GHB EST-MCNC: 166 MG/DL
GFR, ESTIMATED: >90 ML/MIN/1.73M2
GLUCOSE SERPL-MCNC: 155 MG/DL (ref 74–99)
HBA1C MFR BLD: 7.4 % (ref 4–6)
HCT VFR BLD AUTO: 38.1 % (ref 40.7–50.3)
HGB BLD-MCNC: 11.4 G/DL (ref 13–17)
MAGNESIUM SERPL-MCNC: 2 MG/DL (ref 1.6–2.4)
MCH RBC QN AUTO: 24.8 PG (ref 25.2–33.5)
MCHC RBC AUTO-ENTMCNC: 29.9 G/DL (ref 28.4–34.8)
MCV RBC AUTO: 82.8 FL (ref 82.6–102.9)
MICROALBUMIN UR-MCNC: 185 MG/L (ref 0–20)
MICROALBUMIN/CREAT UR-RTO: 200 MCG/MG CREAT (ref 0–17)
NRBC BLD-RTO: 0 PER 100 WBC
PLATELET # BLD AUTO: 409 K/UL (ref 138–453)
PMV BLD AUTO: 9.8 FL (ref 8.1–13.5)
POTASSIUM SERPL-SCNC: 4.4 MMOL/L (ref 3.7–5.3)
RBC # BLD AUTO: 4.6 M/UL (ref 4.21–5.77)
SODIUM SERPL-SCNC: 138 MMOL/L (ref 136–145)
WBC OTHER # BLD: 7 K/UL (ref 3.5–11.3)

## 2024-09-13 LAB
MICROORGANISM SPEC CULT: NO GROWTH
SPECIMEN DESCRIPTION: NORMAL

## 2025-08-25 ENCOUNTER — HOSPITAL ENCOUNTER (OUTPATIENT)
Age: 70
Setting detail: SPECIMEN
Discharge: HOME | End: 2025-08-25

## 2025-08-25 LAB
ALBUMIN SERPL-MCNC: 4.2 G/DL (ref 3.5–5.2)
ALBUMIN/GLOB SERPL: 1.4 {RATIO} (ref 1–2.5)
ALP SERPL-CCNC: 87 U/L (ref 40–129)
ALT SERPL-CCNC: 23 U/L (ref 10–50)
ANION GAP SERPL CALCULATED.3IONS-SCNC: 14 MMOL/L (ref 9–16)
AST SERPL-CCNC: 18 U/L (ref 10–50)
BASOPHILS # BLD: 0.12 K/UL (ref 0–0.2)
BASOPHILS NFR BLD: 2 % (ref 0–2)
BILIRUB DIRECT SERPL-MCNC: 0.3 MG/DL (ref 0–0.2)
BILIRUB INDIRECT SERPL-MCNC: 0.7 MG/DL (ref 0–1)
BILIRUB SERPL-MCNC: 1 MG/DL (ref 0–1.2)
BUN SERPL-MCNC: 20 MG/DL (ref 8–23)
CALCIUM SERPL-MCNC: 9.2 MG/DL (ref 8.6–10.4)
CHLORIDE SERPL-SCNC: 105 MMOL/L (ref 98–107)
CHOLEST SERPL-MCNC: 168 MG/DL (ref 0–199)
CHOLESTEROL/HDL RATIO: 3.7
CO2 SERPL-SCNC: 23 MMOL/L (ref 20–31)
CREAT SERPL-MCNC: 1.1 MG/DL (ref 0.7–1.2)
CREAT UR-MCNC: 139 MG/DL (ref 39–259)
EOSINOPHIL # BLD: 0.49 K/UL (ref 0–0.44)
EOSINOPHILS RELATIVE PERCENT: 7 % (ref 1–4)
ERYTHROCYTE [DISTWIDTH] IN BLOOD BY AUTOMATED COUNT: 17.2 % (ref 11.8–14.4)
EST. AVERAGE GLUCOSE BLD GHB EST-MCNC: 186 MG/DL
GFR, ESTIMATED: 72 ML/MIN/1.73M2
GLUCOSE SERPL-MCNC: 175 MG/DL (ref 74–99)
HBA1C MFR BLD: 8.1 % (ref 4–6)
HCT VFR BLD AUTO: 46.4 % (ref 40.7–50.3)
HDLC SERPL-MCNC: 46 MG/DL
HGB BLD-MCNC: 14.3 G/DL (ref 13–17)
IMM GRANULOCYTES # BLD AUTO: 0.04 K/UL (ref 0–0.3)
IMM GRANULOCYTES NFR BLD: 1 %
LDLC SERPL CALC-MCNC: 94 MG/DL (ref 0–100)
LYMPHOCYTES NFR BLD: 1.51 K/UL (ref 1.1–3.7)
LYMPHOCYTES RELATIVE PERCENT: 22 % (ref 24–43)
MCH RBC QN AUTO: 24.5 PG (ref 25.2–33.5)
MCHC RBC AUTO-ENTMCNC: 30.8 G/DL (ref 28.4–34.8)
MCV RBC AUTO: 79.5 FL (ref 82.6–102.9)
MICROALBUMIN UR-MCNC: 570 MG/L (ref 0–20)
MICROALBUMIN/CREAT UR-RTO: 410 MCG/MG CREAT (ref 0–17)
MONOCYTES NFR BLD: 0.71 K/UL (ref 0.1–1.2)
MONOCYTES NFR BLD: 11 % (ref 3–12)
NEUTROPHILS NFR BLD: 57 % (ref 36–65)
NEUTS SEG NFR BLD: 3.88 K/UL (ref 1.5–8.1)
NRBC BLD-RTO: 0 PER 100 WBC
PLATELET # BLD AUTO: 284 K/UL (ref 138–453)
PMV BLD AUTO: 9.9 FL (ref 8.1–13.5)
POTASSIUM SERPL-SCNC: 4.1 MMOL/L (ref 3.7–5.3)
PROT SERPL-MCNC: 7.1 G/DL (ref 6.6–8.7)
RBC # BLD AUTO: 5.84 M/UL (ref 4.21–5.77)
RBC # BLD: ABNORMAL 10*6/UL
SODIUM SERPL-SCNC: 142 MMOL/L (ref 136–145)
TRIGL SERPL-MCNC: 140 MG/DL (ref 0–149)
TSH SERPL DL<=0.05 MIU/L-ACNC: 3.07 UIU/ML (ref 0.27–4.2)
VLDLC SERPL CALC-MCNC: 28 MG/DL (ref 1–30)
WBC OTHER # BLD: 6.8 K/UL (ref 3.5–11.3)

## (undated) DEVICE — DRAINBAG,ANTI-REFLUX TOWER,L/F,2000ML,LL: Brand: MEDLINE

## (undated) DEVICE — STRAP,CATHETER,ELASTIC,HOOK&LOOP: Brand: MEDLINE

## (undated) DEVICE — ELECTRODE PT RET AD L9FT HI MOIST COND ADH HYDRGEL CORDED

## (undated) DEVICE — CATHETER,FOLEY,COUDE,LATEX,18FR,10ML: Brand: MEDLINE

## (undated) DEVICE — Device

## (undated) DEVICE — PROTECTOR ULN NRV PUR FOAM HK LOOP STRP ANATOMICALLY

## (undated) DEVICE — PLUG,CATHETER,DRAINAGE PROTECTOR,TUBE: Brand: MEDLINE

## (undated) DEVICE — CATHETER FOL 24 FR 30 CC 3 W HYDRGEL COAT DOVER

## (undated) DEVICE — SYRINGE,TOOMEY,IRRIGATION,70CC,STERILE: Brand: MEDLINE

## (undated) DEVICE — STAZ CYSTO: Brand: MEDLINE INDUSTRIES, INC.

## (undated) DEVICE — TOWEL,OR,DSP,ST,BLUE,STD,4/PK,20PK/CS: Brand: MEDLINE

## (undated) DEVICE — SOLUTION IRRIG 3000ML STRL H2O USP UROMATIC PLAS CONT

## (undated) DEVICE — BAG,LEG,COMFORT-STRAPS,LARGE,32OZ: Brand: MEDLINE

## (undated) DEVICE — DISCONTINUED NO SUB IDED CATH URO-DYNMC MALE FEMALE LUER ADAPT

## (undated) DEVICE — GLOVE SURG SZ 7 L12IN FNGR THK87MIL WHT LTX FREE

## (undated) DEVICE — GOWN,AURORA,NONRNF,XL,30/CS: Brand: MEDLINE

## (undated) DEVICE — LASER SURG W22XH58IN D36IN 475LB SLD STATE FREQ DOUBLED

## (undated) DEVICE — BAG,DRAINAGE,4L,A/R TOWER,METAL CLAMP: Brand: MEDLINE

## (undated) DEVICE — STERILE PREPRINTED LABELS W/ D: Brand: MEDLINE

## (undated) DEVICE — INTRODUCER CATH 16FR ORNG SUPRPUB PLAS SHRP TIP BVL TRCR

## (undated) DEVICE — SUTURE ETHLN SZ 3-0 L18IN NONABSORBABLE BLK L24MM PS-1 3/8 1663G

## (undated) DEVICE — SYRINGE MED 50ML LUERLOCK TIP

## (undated) DEVICE — CYSTO/BLADDER IRRIGATION SET, REGULATING CLAMP

## (undated) DEVICE — RADIFOCUS GLIDEWIRE: Brand: GLIDEWIRE

## (undated) DEVICE — TUR/ENDOSCOPIC CABLE, 10' (3.05 M): Brand: CONMED

## (undated) DEVICE — NEEDLE SPNL 22GA L7IN SPINOCAN

## (undated) DEVICE — Y-TYPE TUR/BLADDER IRRIGATION SET, REGULATING CLAMP

## (undated) DEVICE — PAD N ADH W3XL4IN POLY COT SFT PERF FLM EASILY CUT ABSRB

## (undated) DEVICE — CATHETER URETH 22FR BLLN 30CC 3 W F SPEC INF CTRL BARDX

## (undated) DEVICE — BLADDER EVACUATOR: Brand: UROVAC BLADDER EVACUATOR

## (undated) DEVICE — GOWN,SIRUS,NONRNF,SETINSLV,XL,20/CS: Brand: MEDLINE

## (undated) DEVICE — GLOVE SURG SZ 75 L12IN FNGR THK87MIL WHT LTX FREE

## (undated) DEVICE — EVACUATOR URO BLDR W/ ADPT UROVAC

## (undated) DEVICE — DUP USE 240185 SOLUTION IV IRRIG WATER 1000ML IRRIG BAG 2B7114

## (undated) DEVICE — RENTAL LASER XPS CASE

## (undated) DEVICE — PORT VLV 2 W NDL FREE SMRTSITE

## (undated) DEVICE — CUTTING LOOP, BIPOLAR, 0.30MM, 24/26 FR.: Brand: N.A.

## (undated) DEVICE — GLOVE ORTHO 8   MSG9480

## (undated) DEVICE — GOWN,AURORA,NONREINFORCED,LARGE: Brand: MEDLINE

## (undated) DEVICE — DRAPE,REIN 53X77,STERILE: Brand: MEDLINE

## (undated) DEVICE — SYRINGE MED 30ML STD CLR PLAS LUERLOCK TIP N CTRL DISP

## (undated) DEVICE — GLOVE SURG 7.5 11.7IN BEAD CUF LIGHT BRN SENSICARE LTX FREE

## (undated) DEVICE — CATHETER URET 8FR L65CM PLAS OPN CONE TIP RADPQ DISP

## (undated) DEVICE — 60 ML SYRINGE LUER-LOCK TIP: Brand: MONOJECT

## (undated) DEVICE — 60 ML SYRINGE,CATHETER TIP: Brand: MONOJECT

## (undated) DEVICE — MAX-CORE® DISPOSABLE CORE BIOPSY INSTRUMENT, 18G X 20CM: Brand: MAX-CORE

## (undated) DEVICE — NEEDLE SPINAL 22GA L3.5IN SPINOCAN

## (undated) DEVICE — GLOVE SURG SZ 7 CRM LTX FREE POLYISOPRENE POLYMER BEAD ANTI

## (undated) DEVICE — PACK PROCEDURE SURG CYSTO SVMMC LF

## (undated) DEVICE — INTENDED FOR TISSUE SEPARATION, AND OTHER PROCEDURES THAT REQUIRE A SHARP SURGICAL BLADE TO PUNCTURE OR CUT.: Brand: BARD-PARKER ® CARBON RIB-BACK BLADES

## (undated) DEVICE — CATHETER URETH 16FR BLLN 5CC SIL ALLY W/ SIL HYDRGEL 2 W F

## (undated) DEVICE — GLOVE SURG SZ 65 THK91MIL LTX FREE SYN POLYISOPRENE